# Patient Record
Sex: FEMALE | Race: WHITE | NOT HISPANIC OR LATINO
[De-identification: names, ages, dates, MRNs, and addresses within clinical notes are randomized per-mention and may not be internally consistent; named-entity substitution may affect disease eponyms.]

---

## 2017-03-03 ENCOUNTER — TRANSCRIPTION ENCOUNTER (OUTPATIENT)
Age: 82
End: 2017-03-03

## 2017-03-03 PROBLEM — Z00.00 ENCOUNTER FOR PREVENTIVE HEALTH EXAMINATION: Status: ACTIVE | Noted: 2017-03-03

## 2017-03-09 ENCOUNTER — APPOINTMENT (OUTPATIENT)
Age: 82
End: 2017-03-09

## 2017-03-23 ENCOUNTER — APPOINTMENT (OUTPATIENT)
Dept: WOUND CARE | Facility: CLINIC | Age: 82
End: 2017-03-23

## 2017-04-20 ENCOUNTER — APPOINTMENT (OUTPATIENT)
Age: 82
End: 2017-04-20

## 2017-04-20 ENCOUNTER — OUTPATIENT (OUTPATIENT)
Dept: OUTPATIENT SERVICES | Facility: HOSPITAL | Age: 82
LOS: 1 days | Discharge: HOME | End: 2017-04-20

## 2017-06-01 ENCOUNTER — APPOINTMENT (OUTPATIENT)
Age: 82
End: 2017-06-01

## 2017-06-27 DIAGNOSIS — L08.9 LOCAL INFECTION OF THE SKIN AND SUBCUTANEOUS TISSUE, UNSPECIFIED: ICD-10-CM

## 2017-06-27 DIAGNOSIS — L89.214 PRESSURE ULCER OF RIGHT HIP, STAGE 4: ICD-10-CM

## 2017-06-27 DIAGNOSIS — M25.551 PAIN IN RIGHT HIP: ICD-10-CM

## 2019-02-04 ENCOUNTER — APPOINTMENT (OUTPATIENT)
Dept: GERIATRICS | Facility: HOME HEALTH | Age: 84
End: 2019-02-04

## 2019-02-08 NOTE — PHYSICAL EXAM
[General Appearance - Alert] : alert [General Appearance - In No Acute Distress] : in no acute distress

## 2019-06-13 ENCOUNTER — APPOINTMENT (OUTPATIENT)
Dept: GERIATRICS | Facility: HOME HEALTH | Age: 84
End: 2019-06-13
Payer: MEDICARE

## 2019-06-13 PROCEDURE — 99348 HOME/RES VST EST LOW MDM 30: CPT

## 2019-06-16 NOTE — HISTORY OF PRESENT ILLNESS
[FreeTextEntry1] : pt is homebound due to gait instability and high fall risk. denies any acute complains. arthritis has improved with Rx and therapy and pt feels more stable to manage her ADL by her own. sleep and appetite fairly well [0] : 2) Feeling down, depressed, or hopeless: Not at all

## 2019-06-16 NOTE — REVIEW OF SYSTEMS
[Arthralgias] : no arthralgias [Joint Stiffness] : joint stiffness [Limb Weakness] : limb weakness [Difficulty Walking] : difficulty walking [de-identified] : pt felt easy bruising so has stopped taking ASP and has currently no problems [Negative] : Genitourinary

## 2019-06-16 NOTE — PHYSICAL EXAM
[General Appearance - In No Acute Distress] : in no acute distress [Outer Ear] : the ears and nose were normal in appearance [Sclera] : the sclera and conjunctiva were normal [Extraocular Movements] : extraocular movements were intact [PERRL With Normal Accommodation] : pupils were equal in size, round, and reactive to light [Neck Cervical Mass (___cm)] : no neck mass was observed [Neck Appearance] : the appearance of the neck was normal [Oropharynx] : the oropharynx was normal [Jugular Venous Distention Increased] : there was no jugular-venous distention [Thyroid Diffuse Enlargement] : the thyroid was not enlarged [Auscultation Breath Sounds / Voice Sounds] : lungs were clear to auscultation bilaterally [Thyroid Nodule] : there were no palpable thyroid nodules [Heart Sounds] : normal S1 and S2 [Heart Sounds Gallop] : no gallops [Heart Rate And Rhythm] : heart rate was normal and rhythm regular [Murmurs] : no murmurs [Full Pulse] : the pedal pulses are present [Heart Sounds Pericardial Friction Rub] : no pericardial rub [Edema] : there was no peripheral edema [___ +] : bilateral [unfilled]+ pitting edema to the ankles [Pitting Edema] : pitting edema present [Breast Appearance] : normal in appearance [Breast Palpation Mass] : no palpable masses [Bowel Sounds] : normal bowel sounds [Abdomen Soft] : soft [Abdomen Tenderness] : non-tender [Abdomen Mass (___ Cm)] : no abdominal mass palpated [No CVA Tenderness] : no ~M costovertebral angle tenderness [Ataxic] : ataxic [No Spinal Tenderness] : no spinal tenderness [Skin Turgor] : normal skin turgor [] : no rash [Skin Color & Pigmentation] : normal skin color and pigmentation [FreeTextEntry1] : motor deficits 2/5 LE b/l

## 2019-09-21 ENCOUNTER — INPATIENT (INPATIENT)
Facility: HOSPITAL | Age: 84
LOS: 3 days | Discharge: NURSING, REHAB OR LONG-TERM CARE | End: 2019-09-25
Attending: HOSPITALIST | Admitting: HOSPITALIST
Payer: MEDICARE

## 2019-09-21 VITALS — WEIGHT: 169.98 LBS

## 2019-09-21 LAB
ACANTHOCYTES BLD QL SMEAR: SIGNIFICANT CHANGE UP
ALBUMIN SERPL ELPH-MCNC: 4.1 G/DL — SIGNIFICANT CHANGE UP (ref 3.5–5.2)
ALP SERPL-CCNC: 75 U/L — SIGNIFICANT CHANGE UP (ref 30–115)
ALT FLD-CCNC: 6 U/L — SIGNIFICANT CHANGE UP (ref 0–41)
ANION GAP SERPL CALC-SCNC: 14 MMOL/L — SIGNIFICANT CHANGE UP (ref 7–14)
ANISOCYTOSIS BLD QL: SIGNIFICANT CHANGE UP
APTT BLD: 27.7 SEC — SIGNIFICANT CHANGE UP (ref 27–39.2)
AST SERPL-CCNC: 22 U/L — SIGNIFICANT CHANGE UP (ref 0–41)
BASOPHILS # BLD AUTO: 0 K/UL — SIGNIFICANT CHANGE UP (ref 0–0.2)
BASOPHILS NFR BLD AUTO: 0 % — SIGNIFICANT CHANGE UP (ref 0–1)
BILIRUB SERPL-MCNC: 0.7 MG/DL — SIGNIFICANT CHANGE UP (ref 0.2–1.2)
BUN SERPL-MCNC: 11 MG/DL — SIGNIFICANT CHANGE UP (ref 10–20)
CALCIUM SERPL-MCNC: 9 MG/DL — SIGNIFICANT CHANGE UP (ref 8.5–10.1)
CHLORIDE SERPL-SCNC: 101 MMOL/L — SIGNIFICANT CHANGE UP (ref 98–110)
CO2 SERPL-SCNC: 24 MMOL/L — SIGNIFICANT CHANGE UP (ref 17–32)
CREAT SERPL-MCNC: 0.8 MG/DL — SIGNIFICANT CHANGE UP (ref 0.7–1.5)
EOSINOPHIL # BLD AUTO: 0.12 K/UL — SIGNIFICANT CHANGE UP (ref 0–0.7)
EOSINOPHIL NFR BLD AUTO: 1.8 % — SIGNIFICANT CHANGE UP (ref 0–8)
GIANT PLATELETS BLD QL SMEAR: PRESENT — SIGNIFICANT CHANGE UP
GLUCOSE SERPL-MCNC: 111 MG/DL — HIGH (ref 70–99)
HCT VFR BLD CALC: 30 % — LOW (ref 37–47)
HGB BLD-MCNC: 9.4 G/DL — LOW (ref 12–16)
INR BLD: 1.2 RATIO — SIGNIFICANT CHANGE UP (ref 0.65–1.3)
LYMPHOCYTES # BLD AUTO: 0.55 K/UL — LOW (ref 1.2–3.4)
LYMPHOCYTES # BLD AUTO: 8 % — LOW (ref 20.5–51.1)
MANUAL SMEAR VERIFICATION: SIGNIFICANT CHANGE UP
MCHC RBC-ENTMCNC: 26.1 PG — LOW (ref 27–31)
MCHC RBC-ENTMCNC: 31.3 G/DL — LOW (ref 32–37)
MCV RBC AUTO: 83.3 FL — SIGNIFICANT CHANGE UP (ref 81–99)
METAMYELOCYTES # FLD: 0.9 % — HIGH (ref 0–0)
MICROCYTES BLD QL: SIGNIFICANT CHANGE UP
MONOCYTES # BLD AUTO: 0.06 K/UL — LOW (ref 0.1–0.6)
MONOCYTES NFR BLD AUTO: 0.9 % — LOW (ref 1.7–9.3)
MYELOCYTES NFR BLD: 2.6 % — HIGH (ref 0–0)
NEUTROPHILS # BLD AUTO: 5.94 K/UL — SIGNIFICANT CHANGE UP (ref 1.4–6.5)
NEUTROPHILS NFR BLD AUTO: 85.8 % — HIGH (ref 42.2–75.2)
OVALOCYTES BLD QL SMEAR: SIGNIFICANT CHANGE UP
PLAT MORPH BLD: ABNORMAL
PLATELET # BLD AUTO: 212 K/UL — SIGNIFICANT CHANGE UP (ref 130–400)
POIKILOCYTOSIS BLD QL AUTO: SIGNIFICANT CHANGE UP
POLYCHROMASIA BLD QL SMEAR: SIGNIFICANT CHANGE UP
POTASSIUM SERPL-MCNC: 5.3 MMOL/L — HIGH (ref 3.5–5)
POTASSIUM SERPL-SCNC: 5.3 MMOL/L — HIGH (ref 3.5–5)
PROT SERPL-MCNC: 6.9 G/DL — SIGNIFICANT CHANGE UP (ref 6–8)
PROTHROM AB SERPL-ACNC: 13.8 SEC — HIGH (ref 9.95–12.87)
RBC # BLD: 3.6 M/UL — LOW (ref 4.2–5.4)
RBC # FLD: 21.1 % — HIGH (ref 11.5–14.5)
RBC BLD AUTO: ABNORMAL
SCHISTOCYTES BLD QL AUTO: SLIGHT — SIGNIFICANT CHANGE UP
SODIUM SERPL-SCNC: 139 MMOL/L — SIGNIFICANT CHANGE UP (ref 135–146)
TROPONIN T SERPL-MCNC: <0.01 NG/ML — SIGNIFICANT CHANGE UP
WBC # BLD: 6.92 K/UL — SIGNIFICANT CHANGE UP (ref 4.8–10.8)
WBC # FLD AUTO: 6.92 K/UL — SIGNIFICANT CHANGE UP (ref 4.8–10.8)

## 2019-09-21 PROCEDURE — 93010 ELECTROCARDIOGRAM REPORT: CPT

## 2019-09-21 PROCEDURE — 71045 X-RAY EXAM CHEST 1 VIEW: CPT | Mod: 26

## 2019-09-21 PROCEDURE — 99291 CRITICAL CARE FIRST HOUR: CPT | Mod: GC

## 2019-09-21 PROCEDURE — 99222 1ST HOSP IP/OBS MODERATE 55: CPT

## 2019-09-21 PROCEDURE — 70450 CT HEAD/BRAIN W/O DYE: CPT | Mod: 26

## 2019-09-21 PROCEDURE — 0042T: CPT

## 2019-09-21 RX ORDER — SODIUM CHLORIDE 5 G/100ML
100 INJECTION, SOLUTION INTRAVENOUS
Refills: 0 | Status: DISCONTINUED | OUTPATIENT
Start: 2019-09-21 | End: 2019-09-21

## 2019-09-21 RX ORDER — ENOXAPARIN SODIUM 100 MG/ML
40 INJECTION SUBCUTANEOUS DAILY
Refills: 0 | Status: DISCONTINUED | OUTPATIENT
Start: 2019-09-21 | End: 2019-09-25

## 2019-09-21 RX ORDER — ASPIRIN/CALCIUM CARB/MAGNESIUM 324 MG
325 TABLET ORAL ONCE
Refills: 0 | Status: COMPLETED | OUTPATIENT
Start: 2019-09-21 | End: 2019-09-21

## 2019-09-21 RX ORDER — ATORVASTATIN CALCIUM 80 MG/1
80 TABLET, FILM COATED ORAL ONCE
Refills: 0 | Status: COMPLETED | OUTPATIENT
Start: 2019-09-21 | End: 2019-09-21

## 2019-09-21 RX ORDER — SODIUM CHLORIDE 9 MG/ML
1000 INJECTION INTRAMUSCULAR; INTRAVENOUS; SUBCUTANEOUS
Refills: 0 | Status: DISCONTINUED | OUTPATIENT
Start: 2019-09-21 | End: 2019-09-23

## 2019-09-21 RX ORDER — ATORVASTATIN CALCIUM 80 MG/1
80 TABLET, FILM COATED ORAL AT BEDTIME
Refills: 0 | Status: DISCONTINUED | OUTPATIENT
Start: 2019-09-22 | End: 2019-09-23

## 2019-09-21 RX ORDER — ASPIRIN/CALCIUM CARB/MAGNESIUM 324 MG
300 TABLET ORAL DAILY
Refills: 0 | Status: DISCONTINUED | OUTPATIENT
Start: 2019-09-22 | End: 2019-09-23

## 2019-09-21 RX ORDER — CHLORHEXIDINE GLUCONATE 213 G/1000ML
1 SOLUTION TOPICAL
Refills: 0 | Status: DISCONTINUED | OUTPATIENT
Start: 2019-09-21 | End: 2019-09-25

## 2019-09-21 RX ADMIN — SODIUM CHLORIDE 75 MILLILITER(S): 9 INJECTION INTRAMUSCULAR; INTRAVENOUS; SUBCUTANEOUS at 22:57

## 2019-09-21 NOTE — H&P ADULT - ASSESSMENT
89 year old female with distant history of Colon Ca with liver mets s/p resection 15 years ago, dementia brought in due to left sided weakness.     #Left sided weakness, dysarthria, mild language deficit, dysconjugate gaze.   R/o Brainstem stroke   NIHSS=14  Passed the time frame for tPA  CTH and CT-Perfusion negative  Admit to stroke unit  MRI brain non-cont ordered  , Lipitor 80 now  Tomorrow start  suppository qd, pending clearance by Speech and swallow  Lipitor 80 qhs  PT/OT, Physiatry  Last /65, if drops <140, consider IV fluids    #DVT PPX: Lovenox  #GI PPX: Not indicated  #Diet: NPO, pending speech and swallow assessment   #Activity: Out of bed with assistance  #Full code 89 year old female with distant history of Colon Ca with liver mets s/p resection 15 years ago, dementia brought in due to left sided weakness.     #Left sided weakness, dysarthria, mild language deficit, dysconjugate gaze.   R/o Brainstem stroke   NIHSS=14  Passed the time frame for tPA  CTH and CT-Perfusion negative  Admit to stroke unit  MRI brain non-cont ordered  , Lipitor 80 now  Tomorrow start  suppository qd, pending clearance by Speech and swallow  Lipitor 80 qhs  PT/OT, Physiatry  Gentle hydration NSS at 75cc/Hr, d/c if BP >180    #DVT PPX: Lovenox  #GI PPX: Not indicated  #Diet: NPO, pending speech and swallow assessment   #Activity: Out of bed with assistance  #Full code

## 2019-09-21 NOTE — ED PROVIDER NOTE - PROGRESS NOTE DETAILS
I discussed case with Cosme from Neuro, will see pt. Pt seen by Neuro team, Dr. Arora. Family endorsed to him that they feel pt probably walked to bed at 7pm. Dr. Arora requests CT perfusion. CT perfusion ordered refused Tpa, will admit to stroke unit. admit to stroke unit, no mismatch on CT perfusion

## 2019-09-21 NOTE — ED ADULT TRIAGE NOTE - CHIEF COMPLAINT QUOTE
SOB from home, patient was hypoxic at triage AMS as per daughter, last known well was 2 days ago AMS as per daughter with slurred speech, and left sided facial droop, last known well was 2 days ago

## 2019-09-21 NOTE — ED ADULT NURSE REASSESSMENT NOTE - NS ED NURSE REASSESS COMMENT FT1
rec'd pt from previous RN; pt with left side deficits (arm and leg), left facial droop, aphasia and dysphagia; does nod to yes/no questions; has 18G to RAC; primatfit applied for urine collection

## 2019-09-21 NOTE — CONSULT NOTE ADULT - ASSESSMENT
Impression:  89 y/o woman with PMH colon ca presents with left sided weakness, dysconjugate gaze, mild language deficit and dysarthria. No significant mismatch on CTP. CTH (-)    Plan:  Admit to stroke unit   mg x1 now then 81mg QD  Lipitor 80 mg now then QD  MRI brain without forest  2d echo with bubble  telemetry monitoring  lipid panel, A1C  PT/OT/Speech eval  no TPA as LKW >4.5 hours and no IA intervention as no significant mismatch on CTP Impression:  88 y/o woman with PMH colon ca presents with left sided weakness, dysconjugate gaze, mild language deficit and dysarthria. No significant mismatch on CTP. CTH (-). Suspect brainstem infarct    Plan:  Admit to stroke unit   mg x1 now then 81mg QD  Lipitor 80 mg now then QD  MRI brain without forest  2d echo with bubble  telemetry monitoring  lipid panel, A1C  PT/OT/Speech eval  REEG  no TPA as LKW >4.5 hours and no IA intervention as no significant mismatch on CTP 88 y/o woman with PMH colon ca presents with left sided weakness, dysconjugate gaze, mild language deficit and dysarthria. No significant mismatch on CTP. CTH (-). Suspect brainstem infarct    Plan:  Admit to stroke unit   mg x1 now then 81mg QD  Lipitor 80 mg now then QD  MRI brain without forest  2d echo with bubble  telemetry monitoring  lipid panel, A1C  PT/OT/Speech eval  REEG  no TPA as LKW >4.5 hours and no IA intervention as no significant mismatch on CTP

## 2019-09-21 NOTE — ED PROVIDER NOTE - ATTENDING CONTRIBUTION TO CARE
NIHSS 14 Agree w/ above.    89 y/o F apparent CVA. Last known well day before yesterday, outside window for emergent intervention.   P: CTH, labs, ekg, neuro consult, reassess.

## 2019-09-21 NOTE — H&P ADULT - NSHPPHYSICALEXAM_GEN_ALL_CORE
PHYSICAL EXAM:T(C): 37.1 (09-21-19 @ 15:00), Max: 37.1 (09-21-19 @ 15:00)  HR: 71 (09-21-19 @ 15:00) (71 - 75)  BP: 159/85 (09-21-19 @ 15:00) (144/65 - 172/77)  RR: 16 (09-21-19 @ 15:00) (16 - 18)  SpO2: 98% (09-21-19 @ 15:00) (95% - 99%)  GENERAL: Pale appearing, looks scared  HEAD:  Atraumatic, Normocephalic  EYES: EOMI, PERRLA, conjunctiva and sclera clear  NECK: Supple, No JVD  CHEST/LUNG: Clear to auscultation bilaterally; No wheeze  HEART: Regular rate and rhythm; No murmurs, rubs, or gallops  ABDOMEN: Soft, Nontender, Nondistended; Bowel sounds present  EXTREMITIES:  2+ Peripheral Pulses, No clubbing, cyanosis, or edema  PSYCH: AAOx3  NEUROLOGY: Left upper and lower extremities 0/5 motor strength, mild left sided facial droop, Broca's aphasia   SKIN: No rashes or lesions

## 2019-09-21 NOTE — H&P ADULT - ATTENDING COMMENTS
A 89 yo Colon Cancer with liver mets s/p resection 15 years ago and dementia was brought to ED by EMS for new left sided weakness and slurred speech. Her daughter came to visit her today and found her with left arm weakness and her speech. Last known well was yesterday afternoon.  in the ED stroke code activated, NIHSS 14, patient is not out of window for tPA.     PHYSICAL EXAM:  GENERAL: NAD, well-developed  HEAD:  Atraumatic, Normocephalic  EYES: EOMI, PERRLA, conjunctiva and sclera clear  NECK: Supple, No JVD  CHEST/LUNG: Clear to auscultation bilaterally; No wheeze  HEART: Regular rate and rhythm; S1 S2  ABDOMEN: Soft, Nontender, Nondistended; Bowel sounds present  EXTREMITIES:  2+ Peripheral Pulses, No clubbing, cyanosis, or edema  PSYCH: AAOx3  NEUROLOGY: CN II-XII intact, motor: UE 5/5, LLE 2/5, RLE 3/5  SKIN: No rashes or lesions    A/p:   Acute CVA:   Left side weakness: Now improved, LUE weakness resolved, left facial droop resolved.   Still with lower extremities weakness L>R  CT Perfusion w/ Maps w/ IV Cont (09.21.19) No evidence of major vessel stenosis or occlusion. Normal perfusion images. Mild atheromatous changes   Start on ASA and Lipitor.   Brain MRI, echo. Neurotology follow up  Admitted to stroke unit.  PT, OT, speech and swallow evaluation.     Anemia: normocytic  check iron studies, ferritin, B12, TSH and folate

## 2019-09-21 NOTE — ED PROVIDER NOTE - PHYSICAL EXAMINATION
CONSTITUTIONAL: NAD  SKIN: Warm dry  HEAD: NCAT  EYES: PERRL, EOMI, disconjugate gaze (new?)  ENT: MMM  NECK: Supple; non tender.  CARD: RRR  RESP: CTAB, no resp distress  ABD: S/NT no R/G  EXT: no pedal edema  NEURO: Awake, alert, + L facial droop, + LUE and LLE weakness, + dysarthria and expression aphasia., follows 2 step commands, NIHSS 14

## 2019-09-21 NOTE — ED PROVIDER NOTE - CLINICAL SUMMARY MEDICAL DECISION MAKING FREE TEXT BOX
Pt w/ neuro deficit c/w CVA. During ED course, pt remained stable, handling secretion well. CTH neg for bleed or mass. Later in course, sister endorsed that brother last spoke to pt at 1pm yesterday. then later also later stated that pt probably walked to bed by self last night 7pm. discussed with neuro team. CT perfusion obtained, but did not show sig mismatch. Pt admitted to neuro unit for further w/up and management.

## 2019-09-21 NOTE — H&P ADULT - NSHPLABSRESULTS_GEN_ALL_CORE
9.4    6.92  )-----------( 212      ( 21 Sep 2019 13:35 )             30.0           09-21    139  |  101  |  11  ----------------------------<  111<H>  5.3<H>   |  24  |  0.8    Ca    9.0      21 Sep 2019 13:35    TPro  6.9  /  Alb  4.1  /  TBili  0.7  /  DBili  x   /  AST  22  /  ALT  6   /  AlkPhos  75  09-21          < from: CT Brain Stroke Protocol (09.21.19 @ 15:25) >      IMPRESSION:     Since 2/8/2017:    Stable examination. No CT evidence foracute intracranial pathology.    Chronic small vessel ischemic changes, stable.    < end of copied text >      < from: CT Head No Cont (09.21.19 @ 14:36) >      IMPRESSION:   Since 2/8/2017:    1.  Stable examination. No CT evidence for acute intracranial pathology.    2.  Stable severe chronic microvascular changes.      < end of copied text >

## 2019-09-21 NOTE — ED PROVIDER NOTE - PROGRESS NOTE ADDITIONAL1
After Visit Summary   10/23/2017    Jay Aguilar    MRN: [id number]           Patient Information     Date Of Birth          1946        Visit Information        Provider Department      10/23/2017 8:00 AM Jay Zapata MD [facility]        Care Instructions          Wound Care Instructions     FOR SUPERFICIAL WOUNDS     [facility] [phone]    [facility] [phone]    x9                   AFTER 24 HOURS YOU SHOULD REMOVE THE BANDAGE AND BEGIN DAILY DRESSING CHANGES AS FOLLOWS:     1) Remove Dressing.     2) Clean and dry the area with tap water using a Q-tip or sterile gauze pad.     3) Apply Vaseline, Aquaphor, Polysporin ointment or Bacitracin ointment over entire wound.  Do NOT use Neosporin ointment.     4) Cover the wound with a band-aid, or a sterile non-stick gauze pad and micropore paper tape      REPEAT THESE INSTRUCTIONS AT LEAST ONCE A DAY UNTIL THE WOUND HAS COMPLETELY HEALED.    It is an old wives tale that a wound heals better when it is exposed to air and allowed to dry out. The wound will heal faster with a better cosmetic result if it is kept moist with ointment and covered with a bandage.    **Do not let the wound dry out.**      Supplies Needed:      *Cotton tipped applicators (Q-tips)    *Polysporin Ointment or Bacitracin Ointment (NOT NEOSPORIN)    *Band-aids or non-stick gauze pads and micropore paper tape.      PATIENT INFORMATION:    During the healing process you will notice a number of changes. All wounds develop a small halo of redness surrounding the wound.  This means healing is occurring. Severe itching with extensive redness usually indicates sensitivity to the ointment or bandage tape used to dress the wound.  You should call our office if this develops.      Swelling  and/or discoloration around your surgical site is common, particularly when performed around the eye.    All wounds normally drain.  The  larger the wound the more drainage there will be.  After 7-10 days, you will notice the wound beginning to shrink and new skin will begin to grow.  The wound is healed when you can see skin has formed over the entire area.  A healed wound has a healthy, shiny look to the surface and is red to dark pink in color to normalize.  Wounds may take approximately 4-6 weeks to heal.  Larger wounds may take 6-8 weeks.  After the wound is healed you may discontinue dressing changes.    You may experience a sensation of tightness as your wound heals. This is normal and will gradually subside.    Your healed wound may be sensitive to temperature changes. This sensitivity improves with time, but if you re having a lot of discomfort, try to avoid temperature extremes.    Patients frequently experience itching after their wound appears to have healed because of the continue healing under the skin.  Plain Vaseline will help relieve the itching.        POSSIBLE COMPLICATIONS    BLEEDIN. Leave the bandage in place.  2. Use tightly rolled up gauze or a cloth to apply direct pressure over the bandage for 30  minutes.  3. Reapply pressure for an additional 30 minutes if necessary  4. Use additional gauze and tape to maintain pressure once the bleeding has stopped.      Sutured Wound Care Instructions for Lips or Chin:           [facility] [phone]  Dr Zapata [phone]    * No strenuous activity for 48 hours. Resume moderate activity in 48 hours. No heavy exercising until you are seen for follow up in one week.     *Take Tylenol as needed for discomfort.    * Do not drink Alcoholic beverages for 48 hours after Surgery.    *Keep the Pressure Bandage (white) in place for 24 hours. If the bandage becomes blood tinged or loose, reinforce it with gauze and tape.    *Remove Pressure bandage in 24 hours.    *Leave the flat (brown) bandage in place until your one week follow up appointment.     *Keep the Bandage dry. Wash  around it carefully.     * If the tape gets soiled or starts to come off, reinforce it with additional paper tape.     *Do not smoke for 3 weeks;  Smoking is detrimental to wound healing.     *It is normal to have swelling and bruising around the incision site. The bruising will fade in approximately 10-14 days. Elevate the area to reduce swelling.    *Try to keep your lips/chin as immobile as possible. Refrain from laughing, smiling and yawning for 3 weeks.     *Eat Soft foods for the first 24 hours and take small bites of food for the entire three weeks.   *When brushing your teeth, use a child's toothbrush or use mouth wash to prevent stretching of the surgery site.  * Numbness, itching and sensitivity to temperature changes can occur after surgery and may take up to 18 months to normalize.   * No straws  POSSIBLE COMPLICATIONS:    BLEEDIN. Leave the Bandage in place.  2. Use tightly rolled up gauze or a cloth to apply direct pressure over the bandage for 20 minutes.   3. Reapply pressure for an additional 20 minutes if necessary.   4. Call the Dermatology Office ([phone]) or go to the nearest Emergency room if pressure alone fails to stop the bleeding.   5. Use additional gauze and tape to maintain pressure once bleeding has stopped.     PAIN:   1. Postoperative Pain should slowly get better.   2. A severe increase in pain can indicate a problem.   Call the Office or Dr. Zapata if this occurs.             Follow-ups after your visit        Follow-up notes from your care team     Return in about 1 week (around 10/30/2017) for BANDAGE CHANGE.      Who to contact     If you have questions or need follow up information about today's clinic visit or your schedule please contact [facility] directly at [phone].  Normal or non-critical lab and imaging results will be communicated to you by MyChart, letter or phone within 4 business days after the clinic has received the results. If you do  "not hear from us within 7 days, please contact the clinic through [employer] or phone. If you have a critical or abnormal lab result, we will notify you by phone as soon as possible.  Submit refill requests through [employer] or call your pharmacy and they will forward the refill request to us. Please allow 3 business days for your refill to be completed.          Additional Information About Your Visit        [email or web address] Information     [employer] lets you send messages to your doctor, view your test results, renew your prescriptions, schedule appointments and more. To sign up, go to [email or web address] . Click on \"Log in\" on the left side of the screen, which will take you to the Welcome page. Then click on \"Sign up Now\" on the right side of the page.     You will be asked to enter the access code listed below, as well as some personal information. Please follow the directions to create your username and password.     Your access code is: [email or web address]  Expires: 2018  3:21 PM     Your access code will  in 90 days. If you need help or a new code, please call your [place] clinic or [phone].        Care EveryWhere ID     This is your Care EveryWhere ID. This could be used by other organizations to access your [place] medical records  [id number]        Your Vitals Were     Pulse Pulse Oximetry                56 95%           Blood Pressure from Last 3 Encounters:   10/23/17 (!) 166/93   03/23/15 (!) 141/92   02/18/15 142/87    Weight from Last 3 Encounters:   03/23/15 (!) 161.4 kg (355 lb 12.8 oz)   02/18/15 (!) 161.9 kg (357 lb)   02/11/15 (!) 160.7 kg (354 lb 3.2 oz)              Today, you had the following     No orders found for display       Primary Care Provider Fax #    [facility] [phone] [address] [facility] [address]        Equal Access to Services     IBAN TIMMONS AH: Abel Navarro, barber long, tavares moraesaliftikhar rogel, júnior fernandez " alysia mckeon'aamaureen ah. So [facility] [phone].    ATENCIÓN: Si habla elif, tiene a madden disposición servicios gratuitos de asistencia lingüística. Ruddy al [phone].    We comply with applicable federal civil rights laws and [place] laws. We do not discriminate on the basis of race, color, national origin, age, disability, sex, sexual orientation, or gender identity.            Thank you!     Thank you for choosing [facility]  for your care. Our goal is always to provide you with excellent care. Hearing back from our patients is one way we can continue to improve our services. Please take a few minutes to complete the written survey that you may receive in the mail after your visit with us. Thank you!             Your Updated Medication List - Protect others around you: Learn how to safely use, store and throw away your medicines at www.disposemymeds.org.          This list is accurate as of: 10/23/17  3:21 PM.  Always use your most recent med list.                   Brand Name Dispense Instructions for use Diagnosis    aspirin 325 MG tablet      Take 325 mg by mouth daily        atenolol 50 MG tablet    TENORMIN     Take 50 mg by mouth daily        omeprazole 20 MG CR capsule    priLOSEC     Take 20 mg by mouth daily           Additional Progress Note...

## 2019-09-21 NOTE — ED ADULT NURSE NOTE - NSIMPLEMENTINTERV_GEN_ALL_ED
Implemented All Fall with Harm Risk Interventions:  [place] to call system. Call bell, personal items and telephone within reach. Instruct patient to call for assistance. Room bathroom lighting operational. Non-slip footwear when patient is off stretcher. Physically safe environment: no spills, clutter or unnecessary equipment. Stretcher in lowest position, wheels locked, appropriate side rails in place. Provide visual cue, wrist band, yellow gown, etc. Monitor gait and stability. Monitor for mental status changes and reorient to person, place, and time. Review medications for side effects contributing to fall risk. Reinforce activity limits and safety measures with patient and family. Provide visual clues: red socks.

## 2019-09-21 NOTE — CONSULT NOTE ADULT - SUBJECTIVE AND OBJECTIVE BOX
HPI:  88 y/o woman with PMH colon ca presents with left sided weakness and dysarthria. LKW estimated to be 7-10PM yesterday based on history. No AC, no antiplatelets    PAST MEDICAL & SURGICAL HISTORY:  colon ca    Home Medications:  none    Vital Signs Last 24 Hrs  T(C): 37.1 (21 Sep 2019 15:00), Max: 37.1 (21 Sep 2019 15:00)  T(F): 98.7 (21 Sep 2019 15:00), Max: 98.7 (21 Sep 2019 15:00)  HR: 71 (21 Sep 2019 15:00) (71 - 75)  BP: 159/85 (21 Sep 2019 15:00) (144/65 - 172/77)  BP(mean): --  RR: 16 (21 Sep 2019 15:00) (16 - 18)  SpO2: 98% (21 Sep 2019 15:00) (95% - 99%)    NIHSS:   LOC a0 b1 c0  Facial 1 left  Gaze 1 dysconjugate gaze ? left  Visual 0  Motor Arm 4 left  Motor Leg 3 left 1 right  Ataxia 0  Sensory 0  Dysarthria 2  Language 1  Extinction 0  Total: 14  baseline MRS: 2-3    Allergies    No Known Allergies    Intolerances      MEDICATIONS  (STANDING):  aspirin 325 milliGRAM(s) Oral once  atorvastatin 80 milliGRAM(s) Oral once    MEDICATIONS  (PRN):      LABS:                        9.4    6.92  )-----------( 212      ( 21 Sep 2019 13:35 )             30.0     09-21    139  |  101  |  11  ----------------------------<  111<H>  5.3<H>   |  24  |  0.8    Ca    9.0      21 Sep 2019 13:35    TPro  6.9  /  Alb  4.1  /  TBili  0.7  /  DBili  x   /  AST  22  /  ALT  6   /  AlkPhos  75  09-21    PT/INR - ( 21 Sep 2019 13:35 )   PT: 13.80 sec;   INR: 1.20 ratio         PTT - ( 21 Sep 2019 13:35 )  PTT:27.7 sec        Neuro Imaging:    EEG:     Echo:   Carotid Doppler: N/A  Telemetry: HPI:  89 y/o woman with PMH colon ca presents with left sided weakness and dysarthria. LKW estimated to be 7-10PM yesterday based on history. No AC, no antiplatelets.  had diplopia per family upon initial presentation.  Baseline has HHA 3 days per week for 3 hours, otherwise able to take care of herself.      PAST MEDICAL & SURGICAL HISTORY:  colon ca    Home Medications:  none    Vital Signs Last 24 Hrs  T(C): 37.1 (21 Sep 2019 15:00), Max: 37.1 (21 Sep 2019 15:00)  T(F): 98.7 (21 Sep 2019 15:00), Max: 98.7 (21 Sep 2019 15:00)  HR: 71 (21 Sep 2019 15:00) (71 - 75)  BP: 159/85 (21 Sep 2019 15:00) (144/65 - 172/77)  BP(mean): --  RR: 16 (21 Sep 2019 15:00) (16 - 18)  SpO2: 98% (21 Sep 2019 15:00) (95% - 99%)    NIHSS:   LOC a0 b1 c0  Facial 1 left  Gaze 1 dysconjugate gaze ? partial OS adduction deficit  Visual 0  Motor Arm 4 left  Motor Leg 3 left 1 right  Ataxia 0  Sensory 0  Dysarthria 2  Language 1  Extinction 0  Total: 14  baseline MRS: 2-3    Allergies    No Known Allergies    Intolerances      MEDICATIONS  (STANDING):  aspirin 325 milliGRAM(s) Oral once  atorvastatin 80 milliGRAM(s) Oral once    MEDICATIONS  (PRN):      LABS:                        9.4    6.92  )-----------( 212      ( 21 Sep 2019 13:35 )             30.0     09-21    139  |  101  |  11  ----------------------------<  111<H>  5.3<H>   |  24  |  0.8    Ca    9.0      21 Sep 2019 13:35    TPro  6.9  /  Alb  4.1  /  TBili  0.7  /  DBili  x   /  AST  22  /  ALT  6   /  AlkPhos  75  09-21    PT/INR - ( 21 Sep 2019 13:35 )   PT: 13.80 sec;   INR: 1.20 ratio         PTT - ( 21 Sep 2019 13:35 )  PTT:27.7 sec        Neuro Imaging:    EEG:     Echo:   Carotid Doppler: N/A  Telemetry:

## 2019-09-21 NOTE — ED PROVIDER NOTE - OBJECTIVE STATEMENT
88 y/o F pmh distant hx colon CA w/ mets to liver, s/p resection, no other sig pmh or meds, p/w AMS and L sided weakness. Last seen well (daughter spoke on phone) Thur about 2pm; daughter went to pt's place today (lives alone) about 1230p, found pt in bed w/ difficulty speaking and moving, called EMS. en route . No recent illness, v/d, cp, HA, trauma. No prior CVA, MI, VTE.

## 2019-09-21 NOTE — ED ADULT NURSE NOTE - OBJECTIVE STATEMENT
pt brought in by ambulance accompanied by family for slurred speech and left upper and lower extremity weakness. pt last known well was Thursday night with unknown onset of symptoms. pt is able to follow commands in the ED. As per family, pt is confused as per pt's baseline. pt is able to state her name and date of birth on arrival.  No facial droop noted in the ED. Left upper and lower extremities noted.

## 2019-09-22 LAB
ANION GAP SERPL CALC-SCNC: 12 MMOL/L — SIGNIFICANT CHANGE UP (ref 7–14)
BASOPHILS # BLD AUTO: 0.02 K/UL — SIGNIFICANT CHANGE UP (ref 0–0.2)
BASOPHILS NFR BLD AUTO: 0.2 % — SIGNIFICANT CHANGE UP (ref 0–1)
BUN SERPL-MCNC: 12 MG/DL — SIGNIFICANT CHANGE UP (ref 10–20)
CALCIUM SERPL-MCNC: 9 MG/DL — SIGNIFICANT CHANGE UP (ref 8.5–10.1)
CHLORIDE SERPL-SCNC: 105 MMOL/L — SIGNIFICANT CHANGE UP (ref 98–110)
CHOLEST SERPL-MCNC: 133 MG/DL — SIGNIFICANT CHANGE UP (ref 100–200)
CO2 SERPL-SCNC: 23 MMOL/L — SIGNIFICANT CHANGE UP (ref 17–32)
CREAT SERPL-MCNC: 0.8 MG/DL — SIGNIFICANT CHANGE UP (ref 0.7–1.5)
EOSINOPHIL # BLD AUTO: 0 K/UL — SIGNIFICANT CHANGE UP (ref 0–0.7)
EOSINOPHIL NFR BLD AUTO: 0 % — SIGNIFICANT CHANGE UP (ref 0–8)
GLUCOSE SERPL-MCNC: 119 MG/DL — HIGH (ref 70–99)
HCT VFR BLD CALC: 28 % — LOW (ref 37–47)
HDLC SERPL-MCNC: 36 MG/DL — LOW
HGB BLD-MCNC: 8.8 G/DL — LOW (ref 12–16)
IMM GRANULOCYTES NFR BLD AUTO: 5 % — HIGH (ref 0.1–0.3)
LIPID PNL WITH DIRECT LDL SERPL: 82 MG/DL — SIGNIFICANT CHANGE UP (ref 4–129)
LYMPHOCYTES # BLD AUTO: 0.41 K/UL — LOW (ref 1.2–3.4)
LYMPHOCYTES # BLD AUTO: 3.4 % — LOW (ref 20.5–51.1)
MAGNESIUM SERPL-MCNC: 2.1 MG/DL — SIGNIFICANT CHANGE UP (ref 1.8–2.4)
MCHC RBC-ENTMCNC: 26.1 PG — LOW (ref 27–31)
MCHC RBC-ENTMCNC: 31.4 G/DL — LOW (ref 32–37)
MCV RBC AUTO: 83.1 FL — SIGNIFICANT CHANGE UP (ref 81–99)
MONOCYTES # BLD AUTO: 0.38 K/UL — SIGNIFICANT CHANGE UP (ref 0.1–0.6)
MONOCYTES NFR BLD AUTO: 3.2 % — SIGNIFICANT CHANGE UP (ref 1.7–9.3)
NEUTROPHILS # BLD AUTO: 10.5 K/UL — HIGH (ref 1.4–6.5)
NEUTROPHILS NFR BLD AUTO: 88.2 % — HIGH (ref 42.2–75.2)
NRBC # BLD: 0 /100 WBCS — SIGNIFICANT CHANGE UP (ref 0–0)
PLATELET # BLD AUTO: 237 K/UL — SIGNIFICANT CHANGE UP (ref 130–400)
POTASSIUM SERPL-MCNC: 4.7 MMOL/L — SIGNIFICANT CHANGE UP (ref 3.5–5)
POTASSIUM SERPL-SCNC: 4.7 MMOL/L — SIGNIFICANT CHANGE UP (ref 3.5–5)
RBC # BLD: 3.37 M/UL — LOW (ref 4.2–5.4)
RBC # FLD: 21.3 % — HIGH (ref 11.5–14.5)
SODIUM SERPL-SCNC: 140 MMOL/L — SIGNIFICANT CHANGE UP (ref 135–146)
TOTAL CHOLESTEROL/HDL RATIO MEASUREMENT: 3.7 RATIO — LOW (ref 4–5.5)
TRIGL SERPL-MCNC: 133 MG/DL — SIGNIFICANT CHANGE UP (ref 10–149)
WBC # BLD: 11.91 K/UL — HIGH (ref 4.8–10.8)
WBC # FLD AUTO: 11.91 K/UL — HIGH (ref 4.8–10.8)

## 2019-09-22 PROCEDURE — 99223 1ST HOSP IP/OBS HIGH 75: CPT | Mod: AI

## 2019-09-22 PROCEDURE — 93306 TTE W/DOPPLER COMPLETE: CPT | Mod: 26

## 2019-09-22 PROCEDURE — 99232 SBSQ HOSP IP/OBS MODERATE 35: CPT

## 2019-09-22 PROCEDURE — 70551 MRI BRAIN STEM W/O DYE: CPT | Mod: 26

## 2019-09-22 RX ORDER — ACETAMINOPHEN 500 MG
650 TABLET ORAL EVERY 8 HOURS
Refills: 0 | Status: DISCONTINUED | OUTPATIENT
Start: 2019-09-22 | End: 2019-09-25

## 2019-09-22 RX ADMIN — ATORVASTATIN CALCIUM 80 MILLIGRAM(S): 80 TABLET, FILM COATED ORAL at 21:48

## 2019-09-22 RX ADMIN — ENOXAPARIN SODIUM 40 MILLIGRAM(S): 100 INJECTION SUBCUTANEOUS at 12:23

## 2019-09-22 RX ADMIN — SODIUM CHLORIDE 75 MILLILITER(S): 9 INJECTION INTRAMUSCULAR; INTRAVENOUS; SUBCUTANEOUS at 09:47

## 2019-09-22 RX ADMIN — Medication 300 MILLIGRAM(S): at 12:24

## 2019-09-22 RX ADMIN — CHLORHEXIDINE GLUCONATE 1 APPLICATION(S): 213 SOLUTION TOPICAL at 06:41

## 2019-09-22 NOTE — PROGRESS NOTE ADULT - SUBJECTIVE AND OBJECTIVE BOX
SUBJECTIVE:    Patient is a 89y old Female who presents with a chief complaint of Left sided weakness (21 Sep 2019 18:43)    Currently admitted to medicine with the primary diagnosis of CVA (cerebral vascular accident)     Today is hospital day 1d. This morning she is resting comfortably in bed and reports no new issues or overnight events.   Pt seen at bedside, is undergoing stroke workup.     PAST MEDICAL & SURGICAL HISTORY  Colon cancer metastasized to liver: Underwent surgical resection 15 years ago    SOCIAL HISTORY:  Negative for smoking/alcohol/drug use.     ALLERGIES:  No Known Allergies    MEDICATIONS:  STANDING MEDICATIONS  aspirin Suppository 300 milliGRAM(s) Rectal daily  atorvastatin 80 milliGRAM(s) Oral at bedtime  chlorhexidine 4% Liquid 1 Application(s) Topical <User Schedule>  enoxaparin Injectable 40 milliGRAM(s) SubCutaneous daily  sodium chloride 0.9%. 1000 milliLiter(s) IV Continuous <Continuous>    PRN MEDICATIONS    VITALS:   T(F): 97.6  HR: 87  BP: 178/72  RR: 16  SpO2: 98%    LABS:                        8.8    11.91 )-----------( 237      ( 22 Sep 2019 06:01 )             28.0     09-22    140  |  105  |  12  ----------------------------<  119<H>  4.7   |  23  |  0.8    Ca    9.0      22 Sep 2019 06:01  Mg     2.1     09-22    TPro  6.9  /  Alb  4.1  /  TBili  0.7  /  DBili  x   /  AST  22  /  ALT  6   /  AlkPhos  75  09-21    PT/INR - ( 21 Sep 2019 13:35 )   PT: 13.80 sec;   INR: 1.20 ratio         PTT - ( 21 Sep 2019 13:35 )  PTT:27.7 sec      Troponin T, Serum: <0.01 ng/mL (09-21-19 @ 13:35)      CARDIAC MARKERS ( 21 Sep 2019 13:35 )  x     / <0.01 ng/mL / x     / x     / x          RADIOLOGY:  < from: CT Head No Cont (09.21.19 @ 14:36) >  IMPRESSION:   Since 2/8/2017:    1.  Stable examination. No CT evidence for acute intracranial pathology.    2.  Stable severe chronic microvascular changes.    < end of copied text >    < from: CT Perfusion w/ Maps w/ IV Cont (09.21.19 @ 18:27) >    IMPRESSION:    1.  No evidence of major vessel stenosis or occlusion. Normal perfusion   images.    2.  Mild atheromatous changes as described.    3.  Tiny (2 mm) focal outpouching of the posterior wall of the distal   cervical right vertebral artery, consider a follow up study for stability.    < end of copied text >    PHYSICAL EXAM:  GEN: No acute distress  LUNGS: Clear to auscultation bilaterally   HEART: S1/S2 present. RRR.   ABD: Soft, non-tender, non-distended. Bowel sounds present  EXT: NC/NC/NE/2+PP/BAKER  NEURO: Left side weakness.

## 2019-09-22 NOTE — PROGRESS NOTE ADULT - ASSESSMENT
89 year old female with distant history of Colon Ca with liver mets s/p resection 15 years ago, dementia brought in due to left sided weakness.     #Left sided weakness, dysarthria, mild language deficit, dysconjugate gaze.   R/o Brainstem stroke   NIHSS=14 on admission.   Passed the time frame for tPA  CTH and CT-Perfusion negative  MRI brain non-cont ordered  , Lipitor 80 on admission.   c/w  suppository qd, pending clearance by Speech and swallow  Lipitor 80 qhs, ASA 81 after cleared by S and S.   PT/OT, Physiatry  Gentle hydration NSS at 75cc/Hr, d/c if BP >180  permissive HTN for now.     # Normocytic Anemia  - No baseline in records  - monitor, HD stable, no sign active GI bleed.  - tranfuse prn     #  Mild Leukocytosis  - possibly reactive, no signs of sepsis.   - monitor.       #DVT PPX: Lovenox  #GI PPX: Not indicated  #Diet: NPO, pending speech and swallow assessment   #Activity: Out of bed with assistance  #Full code

## 2019-09-22 NOTE — PROGRESS NOTE ADULT - ASSESSMENT
Impression:  89 y/o woman with PMH colon ca presents with left sided weakness, dysconjugate gaze, mild language deficit and dysarthria. Ecam today shows some improvement. MRI scheduled for today.    Plan:  Continue stroke unit monitoring  MRI brain  PT/OT/Speech  Echo  REEG  ASA 81mg QD  Lipitor 80 mg QD 89 y/o woman with PMH colon ca presents with left sided weakness, dysconjugate gaze, mild language deficit and dysarthria with some improvement s/o lacunar infarct.    Plan:  Continue stroke unit monitoring  MRI brain  PT/OT/Speech  Echo  REEG  ASA 81mg QD  Lipitor 80 mg QD

## 2019-09-23 LAB
ALBUMIN SERPL ELPH-MCNC: 3.5 G/DL — SIGNIFICANT CHANGE UP (ref 3.5–5.2)
ALP SERPL-CCNC: 63 U/L — SIGNIFICANT CHANGE UP (ref 30–115)
ALT FLD-CCNC: <5 U/L — SIGNIFICANT CHANGE UP (ref 0–41)
ANION GAP SERPL CALC-SCNC: 10 MMOL/L — SIGNIFICANT CHANGE UP (ref 7–14)
APPEARANCE UR: ABNORMAL
AST SERPL-CCNC: 12 U/L — SIGNIFICANT CHANGE UP (ref 0–41)
BACTERIA # UR AUTO: ABNORMAL
BASOPHILS # BLD AUTO: 0 K/UL — SIGNIFICANT CHANGE UP (ref 0–0.2)
BASOPHILS NFR BLD AUTO: 0 % — SIGNIFICANT CHANGE UP (ref 0–1)
BILIRUB SERPL-MCNC: 0.6 MG/DL — SIGNIFICANT CHANGE UP (ref 0.2–1.2)
BILIRUB UR-MCNC: NEGATIVE — SIGNIFICANT CHANGE UP
BUN SERPL-MCNC: 15 MG/DL — SIGNIFICANT CHANGE UP (ref 10–20)
CALCIUM SERPL-MCNC: 8.7 MG/DL — SIGNIFICANT CHANGE UP (ref 8.5–10.1)
CHLORIDE SERPL-SCNC: 109 MMOL/L — SIGNIFICANT CHANGE UP (ref 98–110)
CO2 SERPL-SCNC: 24 MMOL/L — SIGNIFICANT CHANGE UP (ref 17–32)
COLOR SPEC: YELLOW — SIGNIFICANT CHANGE UP
CREAT SERPL-MCNC: 0.8 MG/DL — SIGNIFICANT CHANGE UP (ref 0.7–1.5)
DIFF PNL FLD: ABNORMAL
EOSINOPHIL # BLD AUTO: 0 K/UL — SIGNIFICANT CHANGE UP (ref 0–0.7)
EOSINOPHIL NFR BLD AUTO: 0 % — SIGNIFICANT CHANGE UP (ref 0–8)
EPI CELLS # UR: 2 /HPF — SIGNIFICANT CHANGE UP (ref 0–5)
GLUCOSE SERPL-MCNC: 96 MG/DL — SIGNIFICANT CHANGE UP (ref 70–99)
GLUCOSE UR QL: NEGATIVE — SIGNIFICANT CHANGE UP
HCT VFR BLD CALC: 26.9 % — LOW (ref 37–47)
HGB BLD-MCNC: 8.3 G/DL — LOW (ref 12–16)
HYALINE CASTS # UR AUTO: 2 /LPF — SIGNIFICANT CHANGE UP (ref 0–7)
IMM GRANULOCYTES NFR BLD AUTO: 5.1 % — HIGH (ref 0.1–0.3)
KETONES UR-MCNC: NEGATIVE — SIGNIFICANT CHANGE UP
LEUKOCYTE ESTERASE UR-ACNC: ABNORMAL
LYMPHOCYTES # BLD AUTO: 0.38 K/UL — LOW (ref 1.2–3.4)
LYMPHOCYTES # BLD AUTO: 6.3 % — LOW (ref 20.5–51.1)
MAGNESIUM SERPL-MCNC: 2 MG/DL — SIGNIFICANT CHANGE UP (ref 1.8–2.4)
MCHC RBC-ENTMCNC: 26 PG — LOW (ref 27–31)
MCHC RBC-ENTMCNC: 30.9 G/DL — LOW (ref 32–37)
MCV RBC AUTO: 84.3 FL — SIGNIFICANT CHANGE UP (ref 81–99)
MONOCYTES # BLD AUTO: 0.2 K/UL — SIGNIFICANT CHANGE UP (ref 0.1–0.6)
MONOCYTES NFR BLD AUTO: 3.3 % — SIGNIFICANT CHANGE UP (ref 1.7–9.3)
NEUTROPHILS # BLD AUTO: 5.14 K/UL — SIGNIFICANT CHANGE UP (ref 1.4–6.5)
NEUTROPHILS NFR BLD AUTO: 85.3 % — HIGH (ref 42.2–75.2)
NITRITE UR-MCNC: POSITIVE
NRBC # BLD: 0 /100 WBCS — SIGNIFICANT CHANGE UP (ref 0–0)
PH UR: 6 — SIGNIFICANT CHANGE UP (ref 5–8)
PLATELET # BLD AUTO: 216 K/UL — SIGNIFICANT CHANGE UP (ref 130–400)
POTASSIUM SERPL-MCNC: 4.6 MMOL/L — SIGNIFICANT CHANGE UP (ref 3.5–5)
POTASSIUM SERPL-SCNC: 4.6 MMOL/L — SIGNIFICANT CHANGE UP (ref 3.5–5)
PROT SERPL-MCNC: 6 G/DL — SIGNIFICANT CHANGE UP (ref 6–8)
PROT UR-MCNC: ABNORMAL
RBC # BLD: 3.19 M/UL — LOW (ref 4.2–5.4)
RBC # FLD: 21.4 % — HIGH (ref 11.5–14.5)
RBC CASTS # UR COMP ASSIST: 35 /HPF — HIGH (ref 0–4)
SODIUM SERPL-SCNC: 143 MMOL/L — SIGNIFICANT CHANGE UP (ref 135–146)
SP GR SPEC: 1.02 — SIGNIFICANT CHANGE UP (ref 1.01–1.02)
UROBILINOGEN FLD QL: SIGNIFICANT CHANGE UP
WBC # BLD: 6.03 K/UL — SIGNIFICANT CHANGE UP (ref 4.8–10.8)
WBC # FLD AUTO: 6.03 K/UL — SIGNIFICANT CHANGE UP (ref 4.8–10.8)
WBC UR QL: >720 /HPF — HIGH (ref 0–5)

## 2019-09-23 PROCEDURE — 99232 SBSQ HOSP IP/OBS MODERATE 35: CPT

## 2019-09-23 RX ORDER — ASPIRIN/CALCIUM CARB/MAGNESIUM 324 MG
81 TABLET ORAL DAILY
Refills: 0 | Status: DISCONTINUED | OUTPATIENT
Start: 2019-09-23 | End: 2019-09-25

## 2019-09-23 RX ORDER — ATORVASTATIN CALCIUM 80 MG/1
40 TABLET, FILM COATED ORAL AT BEDTIME
Refills: 0 | Status: DISCONTINUED | OUTPATIENT
Start: 2019-09-23 | End: 2019-09-25

## 2019-09-23 RX ADMIN — Medication 81 MILLIGRAM(S): at 11:41

## 2019-09-23 RX ADMIN — ENOXAPARIN SODIUM 40 MILLIGRAM(S): 100 INJECTION SUBCUTANEOUS at 11:42

## 2019-09-23 RX ADMIN — ATORVASTATIN CALCIUM 40 MILLIGRAM(S): 80 TABLET, FILM COATED ORAL at 22:15

## 2019-09-23 RX ADMIN — CHLORHEXIDINE GLUCONATE 1 APPLICATION(S): 213 SOLUTION TOPICAL at 05:52

## 2019-09-23 RX ADMIN — SODIUM CHLORIDE 75 MILLILITER(S): 9 INJECTION INTRAMUSCULAR; INTRAVENOUS; SUBCUTANEOUS at 02:21

## 2019-09-23 NOTE — PHYSICAL THERAPY INITIAL EVALUATION ADULT - GAIT DISTANCE, PT EVAL
standing 10 seconds x 2 wide PRATIK. with difficulty weight shifting. Pt able to take 3 steps bed to chair with max A x 1 PT anterior for safety and weight shifting

## 2019-09-23 NOTE — PROGRESS NOTE ADULT - SUBJECTIVE AND OBJECTIVE BOX
Neurology Follow up note  Patient seen and examined at bedside , there is improvement in the left sided weakness which is now mild hemiparesis.  Able to tolerate mechanical soft diet     Vital Signs Last 24 Hrs  T(C): 35.7 (23 Sep 2019 21:24), Max: 36.7 (23 Sep 2019 14:43)  T(F): 96.2 (23 Sep 2019 21:24), Max: 98.1 (23 Sep 2019 14:43)  HR: 81 (23 Sep 2019 21:24) (70 - 98)  BP: 138/63 (23 Sep 2019 21:24) (130/59 - 146/63)  BP(mean): --  RR: 18 (23 Sep 2019 21:24) (18 - 18)  SpO2: 97% (23 Sep 2019 17:23) (97% - 99%)          Neurological Exam:   Mental status: Awake, alert and oriented x3.  Recent and remote memory intact.  Naming, repetition and comprehension intact.  Attention/concentration intact.  No dysarthria, no aphasia.  Fund of knowledge appropriate.    Cranial nerves: Fundoscopic exam demonstrated no abnormalities, pupils equally round and reactive to light, visual fields full, no nystagmus, extraocular muscles intact, V1 through V3 intact bilaterally and symmetric, face symmetric, hearing intact to finger rub, palate elevation symmetric, tongue was midline, sternocleidomastoid/shoulder shrug strength bilaterally 5/5.    Motor:  Normal bulk and tone, strength 5/5 in bilateral upper and lower extremities.   strength 5/5.  Rapid alternating movements intact and symmetric.   Sensation: Intact to light touch, proprioception, and pinprick.  No neglect.   Coordination: No dysmetria on finger-to-nose and heel-to-shin.  No clumsiness.  Reflexes: 2+ in upper and lower extremities, downgoing toes bilaterally  Gait: Narrow and steady. No ataxia.  Romberg negative    NIHSS  LOC:       1a: 0    1b(Questions): 0          1c(Instructions): 0            Best Gaze: 0  Visual:0  Motor:                 RUE: 0    RLE: 0     LUE:1     LLE: 1    FACE: 0    Limb Ataxia:0  Sensory: 0      Language:  0     Dysarthria: 1         Extinction and Inattention: 0    NIHSS today: 3           m-RS:4      Medications  acetaminophen  Suppository .. 650 milliGRAM(s) Rectal every 8 hours PRN  aspirin  chewable 81 milliGRAM(s) Oral daily  atorvastatin 40 milliGRAM(s) Oral at bedtime  chlorhexidine 4% Liquid 1 Application(s) Topical <User Schedule>  enoxaparin Injectable 40 milliGRAM(s) SubCutaneous daily      Lab  Lipid Profile (09.22.19 @ 06:01)    Total Cholesterol/HDL Ratio Measurement: 3.7 Ratio    Cholesterol, Serum: 133 mg/dL    Triglycerides, Serum: 133 mg/dL    HDL Cholesterol, Serum: 36: HDL Levels >/= 60 mg/dL are considered beneficial and a "negative" risk  factor.  Effective 08/15/2018: New reference range and interpretive comment. mg/dL    Direct LDL: 82: LDL Cholesterol (mg/dL) --- Interpretive Comment (for adults 18 and over)        Radiology    ECHO:   < from: Transthoracic Echocardiogram (09.22.19 @ 10:40) >  Summary:   1. Left ventricular ejection fraction, by visual estimation, is 55-65%.  2. Normal global left ventricular systolic function.   3. Spectral Doppler shows impaired relaxation pattern of left   ventricular myocardial filling (Grade I diastolic dysfunction).   4. Sclerotic aortic valve with normal opening.   5. No evidence of aortic stenosis.   6. Trivial aortic regurgitation.   7. Estimated pulmonary artery systolic pressure is 43.0 mmHg assuming a   right atrial pressure of 8 mmHg, which is consistent with mild pulmonary   hypertension.   8. No evidence of patent foramen ovale.

## 2019-09-23 NOTE — PROGRESS NOTE ADULT - ASSESSMENT
Assessment and Plan:   This is a 89y old female with distant history of Colon Ca with liver mets s/p resection 15 years ago, dementia brought in due to left sided weakness. She was found to have an acute/subacute infarct of the juno. Her exam has improved since admission.    #R pontine acute/subacute infarct: exam improving. LDL 82  -continue with stroke unit monitoring  -switch to ASA 81mg PO (patient passed S+S eval)  -continue with lipitor 40mg PO  -d/c fluids (BP at goal)  -PT/OT/rehab consults pending  -appreciate neuro recs    #Normocytic anemia: stable  -daily CBC  -maintain T+S  -transfuse < 7    #Leukocytosis: resolved  _____________________________________________  DVT ppx: Lovenox subq  GI ppx: not indicated  Code Status: full code  Diet: dysphagia     DISPO: pending further monitoring. SNF vs 4A

## 2019-09-23 NOTE — PROGRESS NOTE ADULT - ASSESSMENT
89 y/o woman with PMH colon ca presents with left sided weakness, dysconjugate gaze, mild language deficit and dysarthria. No acute events.     MRI shows acute/subacute infarction in the right juno.  LDL: 82  ECHO: Negative for PFO   Brain vascular imaging: No major vascular stenosis, 2mm outpouching in the distal right vertebral artery       Plan:  Continue stroke unit monitoring  PT/OT/rehab Speech  continue ASA 81mg QD  continue Lipitor 80 mg QD 89 y/o woman with PMH colon ca presents with left sided weakness, dysconjugate gaze, mild language deficit and dysarthria. No acute events.     MRI shows acute/subacute infarction in the right juno.  LDL: 82  ECHO: Negative for PFO   Brain vascular imaging: No major vascular stenosis, 2mm outpouching in the distal right vertebral artery       Plan:  Can downgrade from stroke unit and ok for rehab  PT/OT/rehab Speech  continue ASA 81mg QD  continue Lipitor 80 mg QD  Follow up endovascular clinic for aneurysm

## 2019-09-23 NOTE — CONSULT NOTE ADULT - SUBJECTIVE AND OBJECTIVE BOX
Patient is a 89y old  Female who presents with a chief complaint of Left sided weakness (23 Sep 2019 11:38)    HPI:  89 year old female with distant history of Colon Ca with liver mets s/p resection 15 years ago, dementia brought in due to left sided weakness. Patient lives at home alone, was called by daughter around 1 PM yesterday and was fine. Daughter came to visit her today, and found her in bed unable to move left side and having difficulty speaking. Daughter states that patient goes to bed around 10, so that's likely time last known well.   Currently patient denies any complaints.  Per daughter, at baseline patient ambulates at home, has mild dementia, with problems with short term memory. (21 Sep 2019 18:43)      PAST MEDICAL & SURGICAL HISTORY:  Colon cancer metastasized to liver: Underwent surgical resection 15 years ago      Hospital Course: #R pontine acute/subacute infarct: exam improving. LDL 82  -continue with stroke unit monitoring  -switch to ASA 81mg PO (patient passed S+S eval)  -continue with lipitor 40mg PO  -d/c fluids (BP at goal)    TODAY'S SUBJECTIVE & REVIEW OF SYMPTOMS:     Constitutional WNL   Cardio WNL   Resp WNL   GI WNL  Heme WNL  Endo WNL  Skin WNL  MSK WNL  Neuro WNL  Cognitive WNL  Psych WNL      MEDICATIONS  (STANDING):  aspirin  chewable 81 milliGRAM(s) Oral daily  atorvastatin 40 milliGRAM(s) Oral at bedtime  chlorhexidine 4% Liquid 1 Application(s) Topical <User Schedule>  enoxaparin Injectable 40 milliGRAM(s) SubCutaneous daily    MEDICATIONS  (PRN):  acetaminophen  Suppository .. 650 milliGRAM(s) Rectal every 8 hours PRN Mild Pain (1 - 3)      FAMILY HISTORY:      Allergies    No Known Allergies    Intolerances        SOCIAL HISTORY:    [    ] Etoh  [    ] Smoking  [    ] Substance abuse     Home Environment:  [  x  ] Home Alone  [    ] Lives with Family  [    ] Home Health Aid    Dwelling:  [    ] Apartment  [  x  ] Private House  [    ] Adult Home  [    ] Skilled Nursing Facility      [    ] Short Term  [    ] Long Term  [  x  ] Stairs                           [    ] Elevator     FUNCTIONAL STATUS PTA: (Check all that apply)  Ambulation: [ x    ]Independent    [    ] Dependent     [    ] Non-Ambulatory  Assistive Device: [   x ] SA Cane  [    ]  Q Cane  [    ] Walker  [    ]  Wheelchair  ADL : [  x  ] Independent  [    ]  Dependent       Vital Signs Last 24 Hrs  T(C): 36.2 (23 Sep 2019 05:45), Max: 36.2 (23 Sep 2019 05:45)  T(F): 97.2 (23 Sep 2019 05:45), Max: 97.2 (23 Sep 2019 05:45)  HR: 98 (23 Sep 2019 11:25) (70 - 98)  BP: 130/59 (23 Sep 2019 11:25) (130/59 - 162/70)  BP(mean): 100 (22 Sep 2019 18:00) (100 - 100)  RR: 18 (23 Sep 2019 05:45) (16 - 18)  SpO2: 99% (23 Sep 2019 11:25) (97% - 99%)      PHYSICAL EXAM: Alert & Oriented X2  GENERAL: NAD, well-groomed, well-developed  HEAD:  Atraumatic, Normocephalic  EYES: EOMI, PERRLA, conjunctiva and sclera clear  NECK: Supple  CHEST/LUNG: Clear bilaterally  HEART: Regular rate and rhythm  ABDOMEN: Soft, Nontender, Nondistended; Bowel sounds present  EXTREMITIES:  no calf tenderness,no edema BLES    NERVOUS SYSTEM:  Cranial Nerves 2-12 intact [ x   ] Abnormal  [    ]  ROM: WFL all extremities [x    ]  Abnormal [     ]  Motor Strength: WFL all extremities  [ x   ]  Abnormal [    ]  Sensation: intact to light touch [  x  ] Abnormal [    ]  Minimal L sided weakness    FUNCTIONAL STATUS:  Bed Mobility: [   ]  Independent [    ]  Supervision [x    ]  Needs Assistance [  ]  N/A  Transfers: [    ]  Independent [    ]  Supervision [   x ]  Needs Assistance [    ]  N/A    Ambulation:  [    ]  Independent [    ]  Supervision [  x  ]  Needs Assistance [    ]  N/A   ADL:  [    ]   Independent [  x  ] Requires Assistance [    ] N/A       LABS:                        8.3    6.03  )-----------( 216      ( 23 Sep 2019 06:35 )             26.9     09-23    143  |  109  |  15  ----------------------------<  96  4.6   |  24  |  0.8    Ca    8.7      23 Sep 2019 06:35  Mg     2.0     09-23    TPro  6.0  /  Alb  3.5  /  TBili  0.6  /  DBili  x   /  AST  12  /  ALT  <5  /  AlkPhos  63  09-23    PT/INR - ( 21 Sep 2019 13:35 )   PT: 13.80 sec;   INR: 1.20 ratio         PTT - ( 21 Sep 2019 13:35 )  PTT:27.7 sec      RADIOLOGY & ADDITIONAL STUDIES:

## 2019-09-23 NOTE — PROGRESS NOTE ADULT - SUBJECTIVE AND OBJECTIVE BOX
WILLIAN SETH    Chief Complaint:    Handed    HPI:  89 year old female with distant history of Colon Ca with liver mets s/p resection 15 years ago, dementia brought in due to left sided weakness. Patient lives at home alone, was called by daughter around 1 PM yesterday and was fine. Daughter came to visit her today, and found her in bed unable to move left side and having difficulty speaking. Daughter states that patient goes to bed around 10, so that's likely time last known well.   Currently patient denies any complaints.  Per daughter, at baseline patient ambulates at home, has mild dementia, with problems with short term memory. (21 Sep 2019 18:43)      Relevant PMH:  [] Prior ischemic stroke/TIA  [] Afib  []CAD  []HTN  []DLD  []DM []PVD []Obesity [] Sedintary lifestyle []CHF  []RADU  []Cancer Hx     Social History: [] Smoking []  Drug Use: []   Alcohol Use:   [] Other:      Possible Location of Stroke:    Possible Cause of Stroke:    Relevant Cerebral Imaging:    Relevant Cervicocerebral Imaging:      CT Perfusion w/ Maps w/ IV Cont:   EXAM:  CT PERFUSION W MAPS IC            PROCEDURE DATE:  09/21/2019            INTERPRETATION:  Clinical History / Reason for exam: Stroke code    Technique: CT angiogram of the head and neck including perfusion study of   the brain.  Contiguous CT axial images of the head and neck were obtained   following the bolus intravenous administration of 120 cc Optiray with   multiple 3-D and MIP reformats with perfusion mapping performed on the   3-D workstation.    Correlation made with accompanyingnoncontrast head CT.    Findings:    Perfusion:  There is no evidence of focal perfusion deficit to suggest   acute cerebral ischemia.    CTA neck: The visualized aortic arch and great vessel origins are patent.    There is streak artifact from dental amalgam obscuring evaluation of the   upper cervical vessels at the C1-2 level.    The right common carotid artery is patent. The right carotid bifurcation   is patent. There is a tortuous vascular loop of the right ICA below the   skull base with areas of vascular irregularity, likely on an   atherosclerotic basis. There is likely mild degree of resultant stenosis.    The left common carotid artery is patent. The left carotid bifurcation   demonstrates a mild stenosis of the ECA origin. The ECAorigin is patent.   There is a tortuous vascular loop of the left ICA below the skull base. A   portion of the left ICA is completely obscured but there is good filling   distally.    The vertebral arteries are patent. Tiny (2 mm) focal outpouching arising   from the posterior wall of the V3 segment of the right vertebral artery   on series 4 image 317.    CTA brain: There is calcific plaque of the carotid siphons without   significant stenosis. There is a hypoplastic A1 segment of the right SHAWN,  normal variation.    The distal vertebral arteries are patent. The basilar artery is patent.    There are scattered mild ventral cranial stenoses including the left MCA   distal M1 segment and the right proximal PCA.    Other:    Moderate cervical spine degenerative changes.    IMPRESSION:    1.  No evidence of major vessel stenosis or occlusion. Normal perfusion   images.    2.  Mild atheromatous changes as described.    3.  Tiny (2 mm) focal outpouching of the posterior wall of the distal   cervical right vertebral artery, consider a follow up study for stability.                  JONO JIM M.D., ATTENDING RADIOLOGIST  This document has been electronically signed. Sep 21 2019  7:52PM             (09-21-19 @ 18:27)      Relevant blood tests:  Direct LDL: 82 mg/dL [4 - 129] (09-22-19 @ 06:01)      Relevant cardiac rhythm monitoring:    Relevant Cardiac Structure:(TTE/SRAVAN +/-):[]No intracardiac thrombus/[] no vegetation/[]no [place]/EF:      Home Medications:      MEDICATIONS  (STANDING):  aspirin Suppository 300 milliGRAM(s) Rectal daily  atorvastatin 80 milliGRAM(s) Oral at bedtime  chlorhexidine 4% Liquid 1 Application(s) Topical <User Schedule>  enoxaparin Injectable 40 milliGRAM(s) SubCutaneous daily  sodium chloride 0.9%. 1000 milliLiter(s) (75 mL/Hr) IV Continuous <Continuous>      PT/OT/Speech/Rehab/S&Swr:    Exam:    Vital Signs Last 24 Hrs  T(C): 36.1 (22 Sep 2019 22:00), Max: 36.8 (22 Sep 2019 08:00)  T(F): 96.9 (22 Sep 2019 22:00), Max: 98.2 (22 Sep 2019 08:00)  HR: 82 (22 Sep 2019 22:00) (77 - 88)  BP: 140/64 (22 Sep 2019 22:00) (140/64 - 178/72)  BP(mean): 100 (22 Sep 2019 18:00) (97 - 107)  RR: 18 (22 Sep 2019 22:00) (16 - 18)  SpO2: 98% (22 Sep 2019 22:00) (97% - 98%)    NIHSS      LOC:       1a: 0    1b(Questions): 1          1c(Instructions): 0            Best Gaze: 0  Visual:0  Motor:                 RUE: 0    RLE: 2     LUE:1     LLE: 3    FACE: 0    Limb Ataxia:1  Sensory: 0      Language:  0     Dysarthria: 1         Extinction and Inattention: 0    NIHSS on admission:          NIHSS yesterday:          NIHSS today: 9            m-RS:4    Impression:      Suggestion:  Routine stroke workup including:    Disposition:

## 2019-09-23 NOTE — CONSULT NOTE ADULT - ASSESSMENT
IMPRESSION: Rehab of CVA L janna    PRECAUTIONS: [x    ] Cardiac  [    ] Respiratory  [    ] Seizures [    ] Contact Isolation  [    ] Droplet Isolation  [    ] Other    Weight Bearing Status:     RECOMMENDATION:    Out of Bed to Chair     DVT/Decubiti Prophylaxis    REHAB PLAN:     [  x   ] Bedside P/T 3-5 times a week   [x     ] Bedside O/T  2-3 times a week   [     ] No Rehab Therapy Indicated   [     ]  Speech Therapy   Conditioning/ROM                                 ADL  Bed Mobility                                            Conditioning/ROM  Transfers                                                  Bed Mobility  Sitting /Standing Balance                      Transfers                                        Gait Training                                            Sitting/Standing Balance  Stair Training [x   ]Applicable                 Home equipment Eval                                                                     Splinting  [   ] Only      GOALS:   ADL   [    x]   Independent         Transfers  [   x ] Independent            Ambulation  [ x    ] Independent     [x     ] With device                            [    ]  CG                                               [    ]  CG                                                    [     ] CG                            [    ] Min A                                          [    ] Min A                                                [     ] Min  A          DISCHARGE PLAN:   [ x    ]  Good candidate for Intensive Rehabilitation/Hospital based                                             Will tolerate 3hrs Intensive Rehab Daily                                       [      ]  Short Term Rehab in Skilled Nursing Facility                                       [      ]  Home with Outpatient or  services                                         [      ]  Possible Candidate for Intensive Hospital based Rehab

## 2019-09-23 NOTE — PROGRESS NOTE ADULT - ASSESSMENT
88 y/o woman with PMH colon ca presents with left sided weakness, dysconjugate gaze, mild language deficit and dysarthria with some improvement s/o lacunar infarct.  MRI shows acute/subacute infarction in the right juno.       Plan:  Continue stroke unit monitoring  PT/OT/Speech  Echo  Continue  ASA 81mg QD  Lipitor 80 mg QD      Neuroattending note will follow

## 2019-09-23 NOTE — OCCUPATIONAL THERAPY INITIAL EVALUATION ADULT - NS ASR FOLLOW COMMAND OT EVAL
I have personally evaluated and examined the patient. The Attending was available to me as a supervising provider if needed.
100% of the time/able to follow single-step instructions

## 2019-09-23 NOTE — OCCUPATIONAL THERAPY INITIAL EVALUATION ADULT - ADDITIONAL COMMENTS
Pt reports that she resides in a  with 5 steps to enter +bathtub. Pt presents as moderately confused. Background info provided is questionable.

## 2019-09-23 NOTE — PROGRESS NOTE ADULT - SUBJECTIVE AND OBJECTIVE BOX
Hospital Day:  2d    Chief Complaint: Patient is a 89y old female with distant history of Colon Ca with liver mets s/p resection 15 years ago, dementia brought in due to left sided weakness    24 hour events:  -No acute events overnight    Subjective:  -Feels fine, still endorsing weakness on L side but improved since admission. Also noticed that speech is back to baseline, no longer dysarthric  -Denies CP, SOB, palpitations, fevers/chills, abdominal pain, nausea/vomiting, diarrhea     Past Medical Hx:   Colon cancer metastasized to liver  No pertinent past medical history    Past Sx:    Allergies:  No Known Allergies    Current Meds:   Standing Meds:  aspirin  chewable 81 milliGRAM(s) Oral daily  atorvastatin 40 milliGRAM(s) Oral at bedtime  chlorhexidine 4% Liquid 1 Application(s) Topical <User Schedule>  enoxaparin Injectable 40 milliGRAM(s) SubCutaneous daily    PRN Meds:  acetaminophen  Suppository .. 650 milliGRAM(s) Rectal every 8 hours PRN Mild Pain (1 - 3)    HOME MEDICATIONS:      Vital Signs:   T(F): 97.2 (09-23-19 @ 05:45), Max: 98.2 (09-22-19 @ 12:27)  HR: 70 (09-23-19 @ 05:45) (70 - 84)  BP: 138/63 (09-23-19 @ 05:45) (138/63 - 162/70)  RR: 18 (09-23-19 @ 05:45) (16 - 18)  SpO2: 98% (09-22-19 @ 22:00) (97% - 98%)      09-22-19 @ 07:01  -  09-23-19 @ 07:00  --------------------------------------------------------  IN: 1275 mL / OUT: 600 mL / NET: 675 mL    Physical Exam:   GENERAL: NAD  HEENT: NCAT  CHEST/LUNG: CTAB  HEART: Regular rate and rhythm; s1 s2 appreciated, No murmurs, rubs, or gallops  ABDOMEN: Soft, Nontender, Nondistended; Bowel sounds present  EXTREMITIES: No LE edema b/l  NERVOUS SYSTEM:  Alert & Oriented X3. Speech is fluent to both naming and repetition. CN2-12 otherwise intact. LUE 3/5 strength, LLE 3-/5. Right side strength 5/5 in both upper and lower extremities. Sensation intact diffusely. no dysmetria on FNF/HTS.    Labs:                         8.3    6.03  )-----------( 216      ( 23 Sep 2019 06:35 )             26.9     Neutophil% 85.3, Lymphocyte% 6.3, Monocyte% 3.3, Bands% 5.1 09-23-19 @ 06:35    23 Sep 2019 06:35    143    |  109    |  15     ----------------------------<  96     4.6     |  24     |  0.8      Ca    8.7        23 Sep 2019 06:35  Mg     2.0       23 Sep 2019 06:35    TPro  6.0    /  Alb  3.5    /  TBili  0.6    /  DBili  x      /  AST  12     /  ALT  <5     /  AlkPhos  63     23 Sep 2019 06:35    Troponin <0.01, CKMB --, CK -- 09-21-19 @ 13:35    LDL 82    Radiology:   < from: MR Head No Cont (09.22.19 @ 08:33) >  IMPRESSION:    Acute/subacute infarction in the right portion of the juno.    Severe chronic microvascular ischemic changes.    < end of copied text >    < from: CT Perfusion w/ Maps w/ IV Cont (09.21.19 @ 18:27) >  IMPRESSION:    1.  No evidence of major vessel stenosis or occlusion. Normal perfusion   images.    2.  Mild atheromatous changes as described.    3.  Tiny (2 mm) focal outpouching of the posterior wall of the distal   cervical right vertebral artery, consider a follow up study for stability.    < end of copied text >    < from: Transthoracic Echocardiogram (09.22.19 @ 10:40) >  Summary:   1. Left ventricular ejection fraction, by visual estimation, is 55-65%.  2. Normal global left ventricular systolic function.   3. Spectral Doppler shows impaired relaxation pattern of left   ventricular myocardial filling (Grade I diastolic dysfunction).   4. Sclerotic aortic valve with normal opening.   5. No evidence of aortic stenosis.   6. Trivial aortic regurgitation.   7. Estimated pulmonary artery systolic pressure is 43.0 mmHg assuming a   right atrial pressure of 8 mmHg, which is consistent with mild pulmonary   hypertension.   8. No evidence of patent foramen ovale.    < end of copied text >      Assessment and Plan:   This is a 89y old female with distant history of Colon Ca with liver mets s/p resection 15 years ago, dementia brought in due to left sided weakness. She was found to have an acute/subacute infarct of the juno. Her exam has improved since admission.    #R pontine acute/subacute infarct: exam improving. LDL 82  -continue with stroke unit monitoring  -switch to ASA 81mg PO (patient passed S+S eval)  -continue with lipitor 40mg PO  -d/c fluids (BP at goal)  -PT/OT/rehab consults pending  -appreciate neuro recs    #Normocytic anemia: stable  -daily CBC  -maintain T+S  -transfuse < 7    #Leukocytosis: resolved  _____________________________________________  DVT ppx: Lovenox subq  GI ppx: not indicated  Code Status: full code  Diet: dysphagia     DISPO: pending further monitoring. SNF vs 4A Hospital Day:  2d    Chief Complaint: Patient is a 89y old female with distant history of Colon Ca with liver mets s/p resection 15 years ago, dementia brought in due to left sided weakness    24 hour events:  -No acute events overnight    Subjective:  -Feels fine, still endorsing weakness on L side but improved since admission. Also noticed that speech is back to baseline, no longer dysarthric  -Denies CP, SOB, palpitations, fevers/chills, abdominal pain, nausea/vomiting, diarrhea     Past Medical Hx:   Colon cancer metastasized to liver  No pertinent past medical history    Past Sx:    Allergies:  No Known Allergies    Current Meds:   Standing Meds:  aspirin  chewable 81 milliGRAM(s) Oral daily  atorvastatin 40 milliGRAM(s) Oral at bedtime  chlorhexidine 4% Liquid 1 Application(s) Topical <User Schedule>  enoxaparin Injectable 40 milliGRAM(s) SubCutaneous daily    PRN Meds:  acetaminophen  Suppository .. 650 milliGRAM(s) Rectal every 8 hours PRN Mild Pain (1 - 3)    HOME MEDICATIONS:      Vital Signs:   T(F): 97.2 (09-23-19 @ 05:45), Max: 98.2 (09-22-19 @ 12:27)  HR: 70 (09-23-19 @ 05:45) (70 - 84)  BP: 138/63 (09-23-19 @ 05:45) (138/63 - 162/70)  RR: 18 (09-23-19 @ 05:45) (16 - 18)  SpO2: 98% (09-22-19 @ 22:00) (97% - 98%)      09-22-19 @ 07:01  -  09-23-19 @ 07:00  --------------------------------------------------------  IN: 1275 mL / OUT: 600 mL / NET: 675 mL    Physical Exam:   GENERAL: NAD  HEENT: NCAT  CHEST/LUNG: CTAB  HEART: Regular rate and rhythm; s1 s2 appreciated, No murmurs, rubs, or gallops  ABDOMEN: Soft, Nontender, Nondistended; Bowel sounds present  EXTREMITIES: No LE edema b/l  NERVOUS SYSTEM:  Alert & Oriented X3. Speech is fluent to both naming and repetition. CN2-12 otherwise intact. LUE 3/5 strength, LLE 3-/5. Right side strength 5/5 in both upper and lower extremities. Sensation intact diffusely. no dysmetria on FNF/HTS.    Labs:                         8.3    6.03  )-----------( 216      ( 23 Sep 2019 06:35 )             26.9     Neutophil% 85.3, Lymphocyte% 6.3, Monocyte% 3.3, Bands% 5.1 09-23-19 @ 06:35    23 Sep 2019 06:35    143    |  109    |  15     ----------------------------<  96     4.6     |  24     |  0.8      Ca    8.7        23 Sep 2019 06:35  Mg     2.0       23 Sep 2019 06:35    TPro  6.0    /  Alb  3.5    /  TBili  0.6    /  DBili  x      /  AST  12     /  ALT  <5     /  AlkPhos  63     23 Sep 2019 06:35    Troponin <0.01, CKMB --, CK -- 09-21-19 @ 13:35    LDL 82    Radiology:   < from: MR Head No Cont (09.22.19 @ 08:33) >  IMPRESSION:    Acute/subacute infarction in the right portion of the juno.    Severe chronic microvascular ischemic changes.    < end of copied text >    < from: CT Perfusion w/ Maps w/ IV Cont (09.21.19 @ 18:27) >  IMPRESSION:    1.  No evidence of major vessel stenosis or occlusion. Normal perfusion   images.    2.  Mild atheromatous changes as described.    3.  Tiny (2 mm) focal outpouching of the posterior wall of the distal   cervical right vertebral artery, consider a follow up study for stability.    < end of copied text >    < from: Transthoracic Echocardiogram (09.22.19 @ 10:40) >  Summary:   1. Left ventricular ejection fraction, by visual estimation, is 55-65%.  2. Normal global left ventricular systolic function.   3. Spectral Doppler shows impaired relaxation pattern of left   ventricular myocardial filling (Grade I diastolic dysfunction).   4. Sclerotic aortic valve with normal opening.   5. No evidence of aortic stenosis.   6. Trivial aortic regurgitation.   7. Estimated pulmonary artery systolic pressure is 43.0 mmHg assuming a   right atrial pressure of 8 mmHg, which is consistent with mild pulmonary   hypertension.   8. No evidence of patent foramen ovale.    < end of copied text >

## 2019-09-24 ENCOUNTER — TRANSCRIPTION ENCOUNTER (OUTPATIENT)
Age: 84
End: 2019-09-24

## 2019-09-24 LAB
ALBUMIN SERPL ELPH-MCNC: 3.4 G/DL — LOW (ref 3.5–5.2)
ALP SERPL-CCNC: 59 U/L — SIGNIFICANT CHANGE UP (ref 30–115)
ALT FLD-CCNC: <5 U/L — SIGNIFICANT CHANGE UP (ref 0–41)
ANION GAP SERPL CALC-SCNC: 13 MMOL/L — SIGNIFICANT CHANGE UP (ref 7–14)
AST SERPL-CCNC: 13 U/L — SIGNIFICANT CHANGE UP (ref 0–41)
BASOPHILS # BLD AUTO: 0.01 K/UL — SIGNIFICANT CHANGE UP (ref 0–0.2)
BASOPHILS # BLD AUTO: 0.02 K/UL — SIGNIFICANT CHANGE UP (ref 0–0.2)
BASOPHILS NFR BLD AUTO: 0.1 % — SIGNIFICANT CHANGE UP (ref 0–1)
BASOPHILS NFR BLD AUTO: 0.2 % — SIGNIFICANT CHANGE UP (ref 0–1)
BILIRUB SERPL-MCNC: 0.5 MG/DL — SIGNIFICANT CHANGE UP (ref 0.2–1.2)
BLD GP AB SCN SERPL QL: SIGNIFICANT CHANGE UP
BUN SERPL-MCNC: 17 MG/DL — SIGNIFICANT CHANGE UP (ref 10–20)
CALCIUM SERPL-MCNC: 8.6 MG/DL — SIGNIFICANT CHANGE UP (ref 8.5–10.1)
CHLORIDE SERPL-SCNC: 105 MMOL/L — SIGNIFICANT CHANGE UP (ref 98–110)
CO2 SERPL-SCNC: 23 MMOL/L — SIGNIFICANT CHANGE UP (ref 17–32)
CREAT SERPL-MCNC: 0.8 MG/DL — SIGNIFICANT CHANGE UP (ref 0.7–1.5)
EOSINOPHIL # BLD AUTO: 0.01 K/UL — SIGNIFICANT CHANGE UP (ref 0–0.7)
EOSINOPHIL # BLD AUTO: 0.01 K/UL — SIGNIFICANT CHANGE UP (ref 0–0.7)
EOSINOPHIL NFR BLD AUTO: 0.1 % — SIGNIFICANT CHANGE UP (ref 0–8)
EOSINOPHIL NFR BLD AUTO: 0.1 % — SIGNIFICANT CHANGE UP (ref 0–8)
GLUCOSE SERPL-MCNC: 93 MG/DL — SIGNIFICANT CHANGE UP (ref 70–99)
HCT VFR BLD CALC: 25.1 % — LOW (ref 37–47)
HCT VFR BLD CALC: 29 % — LOW (ref 37–47)
HGB BLD-MCNC: 7.6 G/DL — LOW (ref 12–16)
HGB BLD-MCNC: 9 G/DL — LOW (ref 12–16)
IMM GRANULOCYTES NFR BLD AUTO: 5.2 % — HIGH (ref 0.1–0.3)
IMM GRANULOCYTES NFR BLD AUTO: 5.7 % — HIGH (ref 0.1–0.3)
IRON SATN MFR SERPL: 20 % — SIGNIFICANT CHANGE UP (ref 15–50)
IRON SATN MFR SERPL: 42 UG/DL — SIGNIFICANT CHANGE UP (ref 35–150)
LDH SERPL L TO P-CCNC: 350 — HIGH (ref 50–242)
LYMPHOCYTES # BLD AUTO: 0.43 K/UL — LOW (ref 1.2–3.4)
LYMPHOCYTES # BLD AUTO: 0.56 K/UL — LOW (ref 1.2–3.4)
LYMPHOCYTES # BLD AUTO: 4.9 % — LOW (ref 20.5–51.1)
LYMPHOCYTES # BLD AUTO: 5.9 % — LOW (ref 20.5–51.1)
MCHC RBC-ENTMCNC: 25.9 PG — LOW (ref 27–31)
MCHC RBC-ENTMCNC: 26.1 PG — LOW (ref 27–31)
MCHC RBC-ENTMCNC: 30.3 G/DL — LOW (ref 32–37)
MCHC RBC-ENTMCNC: 31 G/DL — LOW (ref 32–37)
MCV RBC AUTO: 83.6 FL — SIGNIFICANT CHANGE UP (ref 81–99)
MCV RBC AUTO: 86.3 FL — SIGNIFICANT CHANGE UP (ref 81–99)
MONOCYTES # BLD AUTO: 0.19 K/UL — SIGNIFICANT CHANGE UP (ref 0.1–0.6)
MONOCYTES # BLD AUTO: 0.31 K/UL — SIGNIFICANT CHANGE UP (ref 0.1–0.6)
MONOCYTES NFR BLD AUTO: 2.6 % — SIGNIFICANT CHANGE UP (ref 1.7–9.3)
MONOCYTES NFR BLD AUTO: 2.7 % — SIGNIFICANT CHANGE UP (ref 1.7–9.3)
NEUTROPHILS # BLD AUTO: 6.22 K/UL — SIGNIFICANT CHANGE UP (ref 1.4–6.5)
NEUTROPHILS # BLD AUTO: 9.9 K/UL — HIGH (ref 1.4–6.5)
NEUTROPHILS NFR BLD AUTO: 86.1 % — HIGH (ref 42.2–75.2)
NEUTROPHILS NFR BLD AUTO: 86.4 % — HIGH (ref 42.2–75.2)
NRBC # BLD: 0 /100 WBCS — SIGNIFICANT CHANGE UP (ref 0–0)
NRBC # BLD: 0 /100 WBCS — SIGNIFICANT CHANGE UP (ref 0–0)
PLATELET # BLD AUTO: 203 K/UL — SIGNIFICANT CHANGE UP (ref 130–400)
PLATELET # BLD AUTO: 331 K/UL — SIGNIFICANT CHANGE UP (ref 130–400)
POTASSIUM SERPL-MCNC: 4.5 MMOL/L — SIGNIFICANT CHANGE UP (ref 3.5–5)
POTASSIUM SERPL-SCNC: 4.5 MMOL/L — SIGNIFICANT CHANGE UP (ref 3.5–5)
PROT SERPL-MCNC: 5.8 G/DL — LOW (ref 6–8)
RBC # BLD: 2.91 M/UL — LOW (ref 4.2–5.4)
RBC # BLD: 3.47 M/UL — LOW (ref 4.2–5.4)
RBC # BLD: 3.47 M/UL — LOW (ref 4.2–5.4)
RBC # FLD: 21.5 % — HIGH (ref 11.5–14.5)
RBC # FLD: 21.8 % — HIGH (ref 11.5–14.5)
RETICS #: 52.1 K/UL — SIGNIFICANT CHANGE UP (ref 25–125)
RETICS/RBC NFR: 1.5 % — SIGNIFICANT CHANGE UP (ref 0.5–1.5)
SODIUM SERPL-SCNC: 141 MMOL/L — SIGNIFICANT CHANGE UP (ref 135–146)
TIBC SERPL-MCNC: 211 UG/DL — LOW (ref 220–430)
UIBC SERPL-MCNC: 169 UG/DL — SIGNIFICANT CHANGE UP (ref 110–370)
WBC # BLD: 11.45 K/UL — HIGH (ref 4.8–10.8)
WBC # BLD: 7.24 K/UL — SIGNIFICANT CHANGE UP (ref 4.8–10.8)
WBC # FLD AUTO: 11.45 K/UL — HIGH (ref 4.8–10.8)
WBC # FLD AUTO: 7.24 K/UL — SIGNIFICANT CHANGE UP (ref 4.8–10.8)

## 2019-09-24 PROCEDURE — 99232 SBSQ HOSP IP/OBS MODERATE 35: CPT

## 2019-09-24 PROCEDURE — 95819 EEG AWAKE AND ASLEEP: CPT | Mod: 26

## 2019-09-24 RX ORDER — ENOXAPARIN SODIUM 100 MG/ML
40 INJECTION SUBCUTANEOUS
Qty: 0 | Refills: 0 | DISCHARGE
Start: 2019-09-24

## 2019-09-24 RX ORDER — ATORVASTATIN CALCIUM 80 MG/1
1 TABLET, FILM COATED ORAL
Qty: 0 | Refills: 0 | DISCHARGE
Start: 2019-09-24

## 2019-09-24 RX ORDER — CEFPODOXIME PROXETIL 100 MG
200 TABLET ORAL
Refills: 0 | Status: DISCONTINUED | OUTPATIENT
Start: 2019-09-24 | End: 2019-09-25

## 2019-09-24 RX ORDER — CEFPODOXIME PROXETIL 100 MG
200 TABLET ORAL
Qty: 0 | Refills: 0 | DISCHARGE
Start: 2019-09-24

## 2019-09-24 RX ORDER — CEFPODOXIME PROXETIL 100 MG
TABLET ORAL
Refills: 0 | Status: DISCONTINUED | OUTPATIENT
Start: 2019-09-24 | End: 2019-09-25

## 2019-09-24 RX ORDER — CEFPODOXIME PROXETIL 100 MG
200 TABLET ORAL ONCE
Refills: 0 | Status: COMPLETED | OUTPATIENT
Start: 2019-09-24 | End: 2019-09-24

## 2019-09-24 RX ORDER — ASPIRIN/CALCIUM CARB/MAGNESIUM 324 MG
1 TABLET ORAL
Qty: 0 | Refills: 0 | DISCHARGE
Start: 2019-09-24

## 2019-09-24 RX ORDER — CEFPODOXIME PROXETIL 100 MG
250 TABLET ORAL EVERY 12 HOURS
Refills: 0 | Status: DISCONTINUED | OUTPATIENT
Start: 2019-09-24 | End: 2019-09-24

## 2019-09-24 RX ORDER — ENOXAPARIN SODIUM 100 MG/ML
0 INJECTION SUBCUTANEOUS
Qty: 0 | Refills: 0 | DISCHARGE
Start: 2019-09-24

## 2019-09-24 RX ORDER — CEFPODOXIME PROXETIL 100 MG
200 TABLET ORAL EVERY 12 HOURS
Refills: 0 | Status: DISCONTINUED | OUTPATIENT
Start: 2019-09-24 | End: 2019-09-24

## 2019-09-24 RX ORDER — CEFPODOXIME PROXETIL 100 MG
250 TABLET ORAL
Qty: 0 | Refills: 0 | DISCHARGE
Start: 2019-09-24

## 2019-09-24 RX ADMIN — ATORVASTATIN CALCIUM 40 MILLIGRAM(S): 80 TABLET, FILM COATED ORAL at 21:33

## 2019-09-24 RX ADMIN — ENOXAPARIN SODIUM 40 MILLIGRAM(S): 100 INJECTION SUBCUTANEOUS at 12:04

## 2019-09-24 RX ADMIN — Medication 200 MILLIGRAM(S): at 12:03

## 2019-09-24 RX ADMIN — CHLORHEXIDINE GLUCONATE 1 APPLICATION(S): 213 SOLUTION TOPICAL at 06:00

## 2019-09-24 RX ADMIN — Medication 81 MILLIGRAM(S): at 12:04

## 2019-09-24 NOTE — PROGRESS NOTE ADULT - ASSESSMENT
Assessment and Plan:   This is a 89y old female with distant history of Colon Ca with liver mets s/p resection 15 years ago, dementia brought in due to left sided weakness. She was found to have an acute/subacute infarct of the juno. Her exam has improved since admission.    #R pontine acute/subacute infarct: exam improving. LDL 82  -okay to downgrade from stroke unit  -continue with ASA 81mg PO  -continue with lipitor 40mg PO  -appreciate neuro recs    #Normocytic anemia: Hgb downtrending  -will send iron studies, retic count, LDH, haptoglobin, B12, folate, and repeat CBC at 4pm  -transfusion threshold of 7.5  -maintain T+S    #Leukocytosis: resolved  _____________________________________________  DVT ppx: Lovenox subq  GI ppx: not indicated  Code Status: full code  Diet: dysphagia     DISPO: pending anemia panel. Approved for 4A

## 2019-09-24 NOTE — DISCHARGE NOTE PROVIDER - CARE PROVIDER_API CALL
Kaleb Tenorio)  Neurology; Vascular Neurology  [address] [place] [address], [address] [id number]  Phone: [phone]  Fax: [phone]  Follow Up Time: 1 month

## 2019-09-24 NOTE — DISCHARGE NOTE PROVIDER - NSDCCPCAREPLAN_GEN_ALL_CORE_FT
PRINCIPAL DISCHARGE DIAGNOSIS  Diagnosis: CVA (cerebral vascular accident)  Assessment and Plan of Treatment: You wer eadmitted with left sided weakness and difficulty speaking that was concerning for a stroke. You were subsequently found to have a stroke in the brainstem on MRI. Your weakness gradually improved, as did your difficulty speaking. You were started on aspirin and lipitor - it is very important that you keep taking these medications as prescribed as they will reduce the risk of further strokes. Please work in rehab to gain your strength back further.  Please follow-up with a neurologist within 2-4 weeks of discharge.  If you experience any difficulty speaking, facial droop, weakness in one part of your body, vision changes, loss of consciousness, or severe headache associated with nausea/vomiting, please come back to the ED immediately.      SECONDARY DISCHARGE DIAGNOSES  Diagnosis: Acute UTI  Assessment and Plan of Treatment: You were found to have a UTI on this admission, for which you were started on a 5 day course of oral antibiotics. Please continue taking these as prescribed. Please seek medical help if you experience any fevers/chills, flank pain, or nausea/vomiting.    Diagnosis: Vertebral artery aneurysm  Assessment and Plan of Treatment: You were found incidentally to have a very small outpouching of one of the arteries supplying your brain. You will require monitoring of this outpouching to make sure it does not get bigger as it can cause a bleed if it ruptures. Please follow-up with Dr. Tenorio in 2-4 weeks after discharge for further evaluation. PRINCIPAL DISCHARGE DIAGNOSIS  Diagnosis: CVA (cerebral vascular accident)  Assessment and Plan of Treatment: You wer eadmitted with left sided weakness and difficulty speaking that was concerning for a stroke. You were subsequently found to have a stroke in the brainstem on MRI. Your weakness gradually improved, as did your difficulty speaking. You were started on aspirin and lipitor - it is very important that you keep taking these medications as prescribed as they will reduce the risk of further strokes. Please work in rehab to gain your strength back further.  Please follow-up with a neurologist within 2-4 weeks of discharge.  If you experience any difficulty speaking, facial droop, weakness in one part of your body, vision changes, loss of consciousness, or severe headache associated with nausea/vomiting, please come back to the ED immediately.      SECONDARY DISCHARGE DIAGNOSES  Diagnosis: Anemia  Assessment and Plan of Treatment: You were found to be anemic on admission, which worsened slightly during your hospital stay. You were given 1 unit of red blood cells. you will need repeat blood counts to ensure your hemoglobin count is not dropping further. Please speak to a doctor if you experience any chest pain or shortness of breath.    Diagnosis: Acute UTI  Assessment and Plan of Treatment: You were found to have a UTI on this admission, for which you were started on a 5 day course of oral antibiotics. Please continue taking these as prescribed. Please seek medical help if you experience any fevers/chills, flank pain, or nausea/vomiting.    Diagnosis: Vertebral artery aneurysm  Assessment and Plan of Treatment: You were found incidentally to have a very small outpouching of one of the arteries supplying your brain. You will require monitoring of this outpouching to make sure it does not get bigger as it can cause a bleed if it ruptures. Please follow-up with Dr. Tenorio in 2-4 weeks after discharge for further evaluation.

## 2019-09-24 NOTE — DISCHARGE NOTE PROVIDER - NSDCFUADDAPPT_GEN_ALL_CORE_FT
Please follow-up with Dr. Tenorio in 2-4 weeks - he will further evaluate the small outpouching in your vertebral artery.     Please follow-up with a neurologist 2-4 weeks after discharge. The name/number of the neurology clinic here at the hospital has been provided for you.

## 2019-09-24 NOTE — DISCHARGE NOTE PROVIDER - NSFOLLOWUPCLINICS_GEN_ALL_ED_FT
[facility]  Neurology  [address] [place] [address], [facility] [address]  [place] [address]  Phone: [phone]  Fax:   Follow Up Time:

## 2019-09-24 NOTE — DISCHARGE NOTE NURSING/CASE MANAGEMENT/SOCIAL WORK - PATIENT PORTAL LINK FT
You can access the FollowMyHealth Patient Portal offered by [facility] by registering at the following website: [email or web address]. By joining [facility]’s FollowMyHealth portal, you will also be able to view your health information using other applications (apps) compatible with our system.

## 2019-09-24 NOTE — DISCHARGE NOTE PROVIDER - HOSPITAL COURSE
89 year old female with distant history of Colon Ca with liver mets s/p resection 15 years ago, dementia brought in due to left sided weakness. Patient lives at home alone, was called by daughter around 1 PM yesterday and was fine. Daughter came to visit her today, and found her in bed unable to move left side and having difficulty speaking. Daughter states that patient goes to bed around 10, so that's likely time last known well.     Currently patient denies any complaints.    Per daughter, at baseline patient ambulates at home, has mild dementia, with problems with short term memory.        She was not a candidate for TPA on admission as she was outside of the window. She was loaded with aspirin and started on lipitor 80mg. n subsequent MRI, she was found to have an acute/subacute infarct of the right juno. She had a small 2mm outpouching in distal right vertebral artery on CTA. Her TTE demonstrated a normal EF, grade 1 diastolic dysfunction, and mild pulmonary hypertension. Her neurological exam remained stable - her residual deficits are mild L sided weakness and dysarthria. She was also started on empiric vantin for a suspected UTI - cultures/sensitivities are still pending at time of discharge. At this time, she is stable for and amenable to discharge to inpatient rehab. 89 year old female with distant history of Colon Ca with liver mets s/p resection 15 years ago, dementia brought in due to left sided weakness. Patient lives at home alone, was called by daughter around 1 PM yesterday and was fine. Daughter came to visit her today, and found her in bed unable to move left side and having difficulty speaking. Daughter states that patient goes to bed around 10, so that's likely time last known well.     Currently patient denies any complaints.    Per daughter, at baseline patient ambulates at home, has mild dementia, with problems with short term memory.        She was not a candidate for TPA on admission as she was outside of the window. She was loaded with aspirin and started on lipitor 80mg. n subsequent MRI, she was found to have an acute/subacute infarct of the right juno. She had a small 2mm outpouching in distal right vertebral artery on CTA. Her TTE demonstrated a normal EF, grade 1 diastolic dysfunction, and mild pulmonary hypertension. Her neurological exam remained stable - her residual deficits are mild L sided weakness and dysarthria. She was also started on empiric vantin for a suspected UTI - cultures/sensitivities are still pending at time of discharge. She was also found to be anemic, which is stable. There is no signs of active bleeding or hemolysis. Iron studies were normal. She received 1 unit of pRBCs on the floor, and will require repeat CBC within 1 week. At this time, she is stable for and amenable to discharge to inpatient rehab.

## 2019-09-24 NOTE — PROGRESS NOTE ADULT - SUBJECTIVE AND OBJECTIVE BOX
Hospital Day:  3d     Chief Complaint: Patient is a 89y old female with distant history of Colon Ca with liver mets s/p resection 15 years ago, dementia brought in due to left sided weakness    24 hour events:  -No acute events overnight    Subjective:  -Feels fine, no complaints this AM  -Denies CP, SOB, palpitations, fevers/chills, abdominal pain, nausea/vomiting, diarrhea     Past Medical Hx:   Colon cancer metastasized to liver  No pertinent past medical history    Past Sx:    Allergies:  No Known Allergies    Current Meds:   Standing Meds:  aspirin  chewable 81 milliGRAM(s) Oral daily  atorvastatin 40 milliGRAM(s) Oral at bedtime  cefpodoxime 200 milliGRAM(s) Oral two times a day  cefpodoxime      chlorhexidine 4% Liquid 1 Application(s) Topical <User Schedule>  enoxaparin Injectable 40 milliGRAM(s) SubCutaneous daily    PRN Meds:  acetaminophen  Suppository .. 650 milliGRAM(s) Rectal every 8 hours PRN Mild Pain (1 - 3)    HOME MEDICATIONS:  aspirin 81 mg oral tablet, chewable: 1 tab(s) orally once a day  atorvastatin 40 mg oral tablet: 1 tab(s) orally once a day (at bedtime)  cefpodoxime: 200 milligram(s) orally 2 times a day  enoxaparin: 40 milligram(s) subcutaneous once a day (at bedtime)  Toprol-XL 25 mg oral tablet, extended release: 1 tab(s) orally once a day. Hold if SBP &lt; 110 or HR &lt;60      Vital Signs:   T(F): 98 (19 @ 14:01), Max: 98 (19 @ 14:01)  HR: 84 (19 @ 14:01) (78 - 91)  BP: 127/58 (19 @ 14:01) (127/58 - 150/67)  RR: 19 (19 @ 14:01) (18 - 20)  SpO2: 95% (19 @ 12:10) (95% - 97%)      19 @ 07:01  -  19 @ 07:00  --------------------------------------------------------  IN: 37.5 mL / OUT: 0 mL / NET: 37.5 mL        Physical Exam:   GENERAL: NAD  HEENT: NCAT  CHEST/LUNG: CTAB  HEART: Regular rate and rhythm; s1 s2 appreciated, No murmurs, rubs, or gallops  ABDOMEN: Soft, Nontender, Nondistended; Bowel sounds present  EXTREMITIES: No LE edema b/l  NERVOUS SYSTEM:  Alert & Oriented X3. Speech is fluent to both naming and repetition. CN2-12 otherwise intact. LUE 4/5 strength, LLE 4-/5. Right side strength 5/5 in both upper and lower extremities. Sensation intact diffusely. no dysmetria on FNF.    Labs:                         7.6    7.24  )-----------( 203      ( 24 Sep 2019 04:30 )             25.1     Neutophil% 86.1, Lymphocyte% 5.9, Monocyte% 2.6, Bands% 5.2 19 @ 04:30    24 Sep 2019 04:30    141    |  105    |  17     ----------------------------<  93     4.5     |  23     |  0.8      Ca    8.6        24 Sep 2019 04:30  Mg     2.0       23 Sep 2019 06:35    TPro  5.8    /  Alb  3.4    /  TBili  0.5    /  DBili  x      /  AST  13     /  ALT  <5     /  AlkPhos  59     24 Sep 2019 04:30    Troponin <0.01, CKMB --, CK -- 19 @ 13:35    Urinalysis Basic - ( 23 Sep 2019 16:30 )    Color: Yellow / Appearance: Turbid / S.024 / pH: x  Gluc: x / Ketone: Negative  / Bili: Negative / Urobili: <2 mg/dL   Blood: x / Protein: 30 mg/dL / Nitrite: Positive   Leuk Esterase: Large / RBC: 35 /HPF / WBC >720 /HPF   Sq Epi: x / Non Sq Epi: 2 /HPF / Bacteria: Many    Radiology:   < from: MR Head No Cont (19 @ 08:33) >  IMPRESSION:    Acute/subacute infarction in the right portion of the juno.    Severe chronic microvascular ischemic changes.    < end of copied text >    < from: CT Perfusion w/ Maps w/ IV Cont (19 @ 18:27) >  IMPRESSION:    1.  No evidence of major vessel stenosis or occlusion. Normal perfusion   images.    2.  Mild atheromatous changes as described.    3.  Tiny (2 mm) focal outpouching of the posterior wall of the distal   cervical right vertebral artery, consider a follow up study for stability.    < end of copied text >    < from: Transthoracic Echocardiogram (19 @ 10:40) >  Summary:   1. Left ventricular ejection fraction, by visual estimation, is 55-65%.  2. Normal global left ventricular systolic function.   3. Spectral Doppler shows impaired relaxation pattern of left   ventricular myocardial filling (Grade I diastolic dysfunction).   4. Sclerotic aortic valve with normal opening.   5. No evidence of aortic stenosis.   6. Trivial aortic regurgitation.   7. Estimated pulmonary artery systolic pressure is 43.0 mmHg assuming a   right atrial pressure of 8 mmHg, which is consistent with mild pulmonary   hypertension.   8. No evidence of patent foramen ovale.    < end of copied text >      Assessment and Plan:   This is a 89y old female with distant history of Colon Ca with liver mets s/p resection 15 years ago, dementia brought in due to left sided weakness. She was found to have an acute/subacute infarct of the juno. Her exam has improved since admission.    #R pontine acute/subacute infarct: exam improving. LDL 82  -okay to downgrade from stroke unit  -continue with ASA 81mg PO  -continue with lipitor 40mg PO  -appreciate neuro recs    #Normocytic anemia: Hgb downtrending  -will send iron studies, retic count, LDH, haptoglobin, B12, folate, and repeat CBC at 4pm  -transfusion threshold of 7.5  -maintain T+S    #Leukocytosis: resolved  _____________________________________________  DVT ppx: Lovenox subq  GI ppx: not indicated  Code Status: full code  Diet: dysphagia     DISPO: pending anemia panel. Approved for 4A Hospital Day:  3d     Chief Complaint: Patient is a 89y old female with distant history of Colon Ca with liver mets s/p resection 15 years ago, dementia brought in due to left sided weakness    24 hour events:  -No acute events overnight    Subjective:  -Feels fine, no complaints this AM  -Denies CP, SOB, palpitations, fevers/chills, abdominal pain, nausea/vomiting, diarrhea     Past Medical Hx:   Colon cancer metastasized to liver  No pertinent past medical history    Past Sx:    Allergies:  No Known Allergies    Current Meds:   Standing Meds:  aspirin  chewable 81 milliGRAM(s) Oral daily  atorvastatin 40 milliGRAM(s) Oral at bedtime  cefpodoxime 200 milliGRAM(s) Oral two times a day  cefpodoxime      chlorhexidine 4% Liquid 1 Application(s) Topical <User Schedule>  enoxaparin Injectable 40 milliGRAM(s) SubCutaneous daily    PRN Meds:  acetaminophen  Suppository .. 650 milliGRAM(s) Rectal every 8 hours PRN Mild Pain (1 - 3)    HOME MEDICATIONS:  aspirin 81 mg oral tablet, chewable: 1 tab(s) orally once a day  atorvastatin 40 mg oral tablet: 1 tab(s) orally once a day (at bedtime)  cefpodoxime: 200 milligram(s) orally 2 times a day  enoxaparin: 40 milligram(s) subcutaneous once a day (at bedtime)  Toprol-XL 25 mg oral tablet, extended release: 1 tab(s) orally once a day. Hold if SBP &lt; 110 or HR &lt;60      Vital Signs:   T(F): 98 (19 @ 14:01), Max: 98 (19 @ 14:01)  HR: 84 (19 @ 14:01) (78 - 91)  BP: 127/58 (19 @ 14:01) (127/58 - 150/67)  RR: 19 (19 @ 14:01) (18 - 20)  SpO2: 95% (19 @ 12:10) (95% - 97%)      19 @ 07:01  -  19 @ 07:00  --------------------------------------------------------  IN: 37.5 mL / OUT: 0 mL / NET: 37.5 mL        Physical Exam:   GENERAL: NAD  HEENT: NCAT  CHEST/LUNG: CTAB  HEART: Regular rate and rhythm; s1 s2 appreciated, No murmurs, rubs, or gallops  ABDOMEN: Soft, Nontender, Nondistended; Bowel sounds present  EXTREMITIES: No LE edema b/l  NERVOUS SYSTEM:  Alert & Oriented X3. Speech is fluent to both naming and repetition. CN2-12 otherwise intact. LUE 4/5 strength, LLE 4-/5. Right side strength 5/5 in both upper and lower extremities. Sensation intact diffusely. no dysmetria on FNF.    Labs:                         7.6    7.24  )-----------( 203      ( 24 Sep 2019 04:30 )             25.1     Neutophil% 86.1, Lymphocyte% 5.9, Monocyte% 2.6, Bands% 5.2 19 @ 04:30    24 Sep 2019 04:30    141    |  105    |  17     ----------------------------<  93     4.5     |  23     |  0.8      Ca    8.6        24 Sep 2019 04:30  Mg     2.0       23 Sep 2019 06:35    TPro  5.8    /  Alb  3.4    /  TBili  0.5    /  DBili  x      /  AST  13     /  ALT  <5     /  AlkPhos  59     24 Sep 2019 04:30    Troponin <0.01, CKMB --, CK -- 19 @ 13:35    Urinalysis Basic - ( 23 Sep 2019 16:30 )    Color: Yellow / Appearance: Turbid / S.024 / pH: x  Gluc: x / Ketone: Negative  / Bili: Negative / Urobili: <2 mg/dL   Blood: x / Protein: 30 mg/dL / Nitrite: Positive   Leuk Esterase: Large / RBC: 35 /HPF / WBC >720 /HPF   Sq Epi: x / Non Sq Epi: 2 /HPF / Bacteria: Many    Radiology:   < from: MR Head No Cont (19 @ 08:33) >  IMPRESSION:    Acute/subacute infarction in the right portion of the juno.    Severe chronic microvascular ischemic changes.    < end of copied text >    < from: CT Perfusion w/ Maps w/ IV Cont (19 @ 18:27) >  IMPRESSION:    1.  No evidence of major vessel stenosis or occlusion. Normal perfusion   images.    2.  Mild atheromatous changes as described.    3.  Tiny (2 mm) focal outpouching of the posterior wall of the distal   cervical right vertebral artery, consider a follow up study for stability.    < end of copied text >    < from: Transthoracic Echocardiogram (19 @ 10:40) >  Summary:   1. Left ventricular ejection fraction, by visual estimation, is 55-65%.  2. Normal global left ventricular systolic function.   3. Spectral Doppler shows impaired relaxation pattern of left   ventricular myocardial filling (Grade I diastolic dysfunction).   4. Sclerotic aortic valve with normal opening.   5. No evidence of aortic stenosis.   6. Trivial aortic regurgitation.   7. Estimated pulmonary artery systolic pressure is 43.0 mmHg assuming a   right atrial pressure of 8 mmHg, which is consistent with mild pulmonary   hypertension.   8. No evidence of patent foramen ovale.    < end of copied text >

## 2019-09-25 ENCOUNTER — INPATIENT (INPATIENT)
Facility: HOSPITAL | Age: 84
LOS: 22 days | Discharge: HOME HEALTH | End: 2019-10-18
Attending: PHYSICAL MEDICINE & REHABILITATION | Admitting: PHYSICAL MEDICINE & REHABILITATION
Payer: MEDICARE

## 2019-09-25 VITALS
TEMPERATURE: 98 F | HEART RATE: 78 BPM | DIASTOLIC BLOOD PRESSURE: 59 MMHG | SYSTOLIC BLOOD PRESSURE: 140 MMHG | OXYGEN SATURATION: 93 % | RESPIRATION RATE: 18 BRPM

## 2019-09-25 LAB
-  AMIKACIN: SIGNIFICANT CHANGE UP
-  AMPICILLIN/SULBACTAM: SIGNIFICANT CHANGE UP
-  AMPICILLIN: SIGNIFICANT CHANGE UP
-  AZTREONAM: SIGNIFICANT CHANGE UP
-  CEFAZOLIN: SIGNIFICANT CHANGE UP
-  CEFEPIME: SIGNIFICANT CHANGE UP
-  CEFOXITIN: SIGNIFICANT CHANGE UP
-  CEFTRIAXONE: SIGNIFICANT CHANGE UP
-  CIPROFLOXACIN: SIGNIFICANT CHANGE UP
-  GENTAMICIN: SIGNIFICANT CHANGE UP
-  IMIPENEM: SIGNIFICANT CHANGE UP
-  LEVOFLOXACIN: SIGNIFICANT CHANGE UP
-  MEROPENEM: SIGNIFICANT CHANGE UP
-  NITROFURANTOIN: SIGNIFICANT CHANGE UP
-  PIPERACILLIN/TAZOBACTAM: SIGNIFICANT CHANGE UP
-  TIGECYCLINE: SIGNIFICANT CHANGE UP
-  TOBRAMYCIN: SIGNIFICANT CHANGE UP
-  TRIMETHOPRIM/SULFAMETHOXAZOLE: SIGNIFICANT CHANGE UP
ALBUMIN SERPL ELPH-MCNC: 3.3 G/DL — LOW (ref 3.5–5.2)
ALP SERPL-CCNC: 58 U/L — SIGNIFICANT CHANGE UP (ref 30–115)
ALT FLD-CCNC: <5 U/L — SIGNIFICANT CHANGE UP (ref 0–41)
ANION GAP SERPL CALC-SCNC: 10 MMOL/L — SIGNIFICANT CHANGE UP (ref 7–14)
AST SERPL-CCNC: 12 U/L — SIGNIFICANT CHANGE UP (ref 0–41)
BASOPHILS # BLD AUTO: 0.01 K/UL — SIGNIFICANT CHANGE UP (ref 0–0.2)
BASOPHILS NFR BLD AUTO: 0.1 % — SIGNIFICANT CHANGE UP (ref 0–1)
BILIRUB SERPL-MCNC: 0.4 MG/DL — SIGNIFICANT CHANGE UP (ref 0.2–1.2)
BUN SERPL-MCNC: 16 MG/DL — SIGNIFICANT CHANGE UP (ref 10–20)
CALCIUM SERPL-MCNC: 8.8 MG/DL — SIGNIFICANT CHANGE UP (ref 8.5–10.1)
CHLORIDE SERPL-SCNC: 106 MMOL/L — SIGNIFICANT CHANGE UP (ref 98–110)
CO2 SERPL-SCNC: 25 MMOL/L — SIGNIFICANT CHANGE UP (ref 17–32)
CREAT SERPL-MCNC: 0.8 MG/DL — SIGNIFICANT CHANGE UP (ref 0.7–1.5)
CULTURE RESULTS: SIGNIFICANT CHANGE UP
EOSINOPHIL # BLD AUTO: 0 K/UL — SIGNIFICANT CHANGE UP (ref 0–0.7)
EOSINOPHIL NFR BLD AUTO: 0 % — SIGNIFICANT CHANGE UP (ref 0–8)
ESTIMATED AVERAGE GLUCOSE: 103 MG/DL — SIGNIFICANT CHANGE UP (ref 68–114)
FERRITIN SERPL-MCNC: 614 NG/ML — HIGH (ref 15–150)
FOLATE SERPL-MCNC: 6.7 NG/ML — SIGNIFICANT CHANGE UP
GLUCOSE SERPL-MCNC: 94 MG/DL — SIGNIFICANT CHANGE UP (ref 70–99)
HAPTOGLOB SERPL-MCNC: 128 MG/DL — SIGNIFICANT CHANGE UP (ref 34–200)
HBA1C BLD-MCNC: 5.2 % — SIGNIFICANT CHANGE UP (ref 4–5.6)
HCT VFR BLD CALC: 23.7 % — LOW (ref 37–47)
HGB BLD-MCNC: 7.5 G/DL — LOW (ref 12–16)
IMM GRANULOCYTES NFR BLD AUTO: 8.5 % — HIGH (ref 0.1–0.3)
LYMPHOCYTES # BLD AUTO: 0.46 K/UL — LOW (ref 1.2–3.4)
LYMPHOCYTES # BLD AUTO: 6.5 % — LOW (ref 20.5–51.1)
MCHC RBC-ENTMCNC: 26.3 PG — LOW (ref 27–31)
MCHC RBC-ENTMCNC: 31.6 G/DL — LOW (ref 32–37)
MCV RBC AUTO: 83.2 FL — SIGNIFICANT CHANGE UP (ref 81–99)
METHOD TYPE: SIGNIFICANT CHANGE UP
MONOCYTES # BLD AUTO: 0.22 K/UL — SIGNIFICANT CHANGE UP (ref 0.1–0.6)
MONOCYTES NFR BLD AUTO: 3.1 % — SIGNIFICANT CHANGE UP (ref 1.7–9.3)
NEUTROPHILS # BLD AUTO: 5.75 K/UL — SIGNIFICANT CHANGE UP (ref 1.4–6.5)
NEUTROPHILS NFR BLD AUTO: 81.8 % — HIGH (ref 42.2–75.2)
NRBC # BLD: 0 /100 WBCS — SIGNIFICANT CHANGE UP (ref 0–0)
ORGANISM # SPEC MICROSCOPIC CNT: SIGNIFICANT CHANGE UP
ORGANISM # SPEC MICROSCOPIC CNT: SIGNIFICANT CHANGE UP
PLATELET # BLD AUTO: 251 K/UL — SIGNIFICANT CHANGE UP (ref 130–400)
POTASSIUM SERPL-MCNC: 4.8 MMOL/L — SIGNIFICANT CHANGE UP (ref 3.5–5)
POTASSIUM SERPL-SCNC: 4.8 MMOL/L — SIGNIFICANT CHANGE UP (ref 3.5–5)
PROT SERPL-MCNC: 5.7 G/DL — LOW (ref 6–8)
RBC # BLD: 2.85 M/UL — LOW (ref 4.2–5.4)
RBC # FLD: 20.9 % — HIGH (ref 11.5–14.5)
SODIUM SERPL-SCNC: 141 MMOL/L — SIGNIFICANT CHANGE UP (ref 135–146)
SPECIMEN SOURCE: SIGNIFICANT CHANGE UP
VIT B12 SERPL-MCNC: 287 PG/ML — SIGNIFICANT CHANGE UP (ref 232–1245)
WBC # BLD: 7.04 K/UL — SIGNIFICANT CHANGE UP (ref 4.8–10.8)
WBC # FLD AUTO: 7.04 K/UL — SIGNIFICANT CHANGE UP (ref 4.8–10.8)

## 2019-09-25 PROCEDURE — 93010 ELECTROCARDIOGRAM REPORT: CPT

## 2019-09-25 PROCEDURE — 99232 SBSQ HOSP IP/OBS MODERATE 35: CPT

## 2019-09-25 PROCEDURE — 99239 HOSP IP/OBS DSCHRG MGMT >30: CPT

## 2019-09-25 PROCEDURE — 99497 ADVNCD CARE PLAN 30 MIN: CPT | Mod: 25

## 2019-09-25 RX ORDER — METOPROLOL TARTRATE 50 MG
25 TABLET ORAL DAILY
Refills: 0 | Status: DISCONTINUED | OUTPATIENT
Start: 2019-09-25 | End: 2019-10-18

## 2019-09-25 RX ORDER — LANOLIN ALCOHOL/MO/W.PET/CERES
3 CREAM (GRAM) TOPICAL AT BEDTIME
Refills: 0 | Status: DISCONTINUED | OUTPATIENT
Start: 2019-09-25 | End: 2019-10-18

## 2019-09-25 RX ORDER — CEFPODOXIME PROXETIL 100 MG
200 TABLET ORAL EVERY 12 HOURS
Refills: 0 | Status: COMPLETED | OUTPATIENT
Start: 2019-09-25 | End: 2019-09-29

## 2019-09-25 RX ORDER — ATORVASTATIN CALCIUM 80 MG/1
40 TABLET, FILM COATED ORAL AT BEDTIME
Refills: 0 | Status: DISCONTINUED | OUTPATIENT
Start: 2019-09-25 | End: 2019-10-18

## 2019-09-25 RX ORDER — ENOXAPARIN SODIUM 100 MG/ML
40 INJECTION SUBCUTANEOUS AT BEDTIME
Refills: 0 | Status: DISCONTINUED | OUTPATIENT
Start: 2019-09-25 | End: 2019-10-18

## 2019-09-25 RX ORDER — MAGNESIUM HYDROXIDE 400 MG/1
30 TABLET, CHEWABLE ORAL DAILY
Refills: 0 | Status: DISCONTINUED | OUTPATIENT
Start: 2019-09-25 | End: 2019-10-18

## 2019-09-25 RX ORDER — PREGABALIN 225 MG/1
1000 CAPSULE ORAL ONCE
Refills: 0 | Status: COMPLETED | OUTPATIENT
Start: 2019-09-25 | End: 2019-09-26

## 2019-09-25 RX ORDER — ACETAMINOPHEN 500 MG
650 TABLET ORAL EVERY 6 HOURS
Refills: 0 | Status: DISCONTINUED | OUTPATIENT
Start: 2019-09-25 | End: 2019-10-18

## 2019-09-25 RX ORDER — ASPIRIN/CALCIUM CARB/MAGNESIUM 324 MG
81 TABLET ORAL DAILY
Refills: 0 | Status: DISCONTINUED | OUTPATIENT
Start: 2019-09-25 | End: 2019-10-18

## 2019-09-25 RX ADMIN — Medication 200 MILLIGRAM(S): at 00:08

## 2019-09-25 RX ADMIN — ENOXAPARIN SODIUM 40 MILLIGRAM(S): 100 INJECTION SUBCUTANEOUS at 21:14

## 2019-09-25 RX ADMIN — ENOXAPARIN SODIUM 40 MILLIGRAM(S): 100 INJECTION SUBCUTANEOUS at 11:38

## 2019-09-25 RX ADMIN — Medication 81 MILLIGRAM(S): at 11:38

## 2019-09-25 RX ADMIN — CHLORHEXIDINE GLUCONATE 1 APPLICATION(S): 213 SOLUTION TOPICAL at 05:44

## 2019-09-25 RX ADMIN — Medication 200 MILLIGRAM(S): at 11:38

## 2019-09-25 RX ADMIN — ATORVASTATIN CALCIUM 40 MILLIGRAM(S): 80 TABLET, FILM COATED ORAL at 21:14

## 2019-09-25 NOTE — PROGRESS NOTE ADULT - PROVIDER SPECIALTY LIST ADULT
Hospitalist
Internal Medicine
Internal Medicine
Neurology
Internal Medicine

## 2019-09-25 NOTE — SWALLOW BEDSIDE ASSESSMENT ADULT - SWALLOW EVAL: RECOMMENDED DIET
Dysphagia Diet III Mechanical soft consistency with cut up meats with thins
dysphagia 3 (soft, cut up) w/ thin liquids
Dysphagia Diet III Mechanical soft consistency with cut up meats with thins

## 2019-09-25 NOTE — PROGRESS NOTE ADULT - SUBJECTIVE AND OBJECTIVE BOX
WILLIAN SETH    Chief Complaint:    Handed    HPI:  89 year old female with distant history of Colon Ca with liver mets s/p resection 15 years ago, dementia brought in due to left sided weakness. Patient lives at home alone, was called by daughter around 1 PM yesterday and was fine. Daughter came to visit her today, and found her in bed unable to move left side and having difficulty speaking. Daughter states that patient goes to bed around 10, so that's likely time last known well.   Currently patient denies any complaints.  Per daughter, at baseline patient ambulates at home, has mild dementia, with problems with short term memory. (21 Sep 2019 18:43)    Patient is examined at the bedside, she is doing well, no acute issues over night. Exam is improving. Patient had a drop in Hemoglobin to 7.5.      Relevant PMH:  [] Prior ischemic stroke/TIA  [] Afib  []CAD  []HTN  []DLD  []DM []PVD []Obesity [] Sedintary lifestyle []CHF  []RADU  []Cancer Hx     Social History: [] Smoking []  Drug Use: []   Alcohol Use:   [] Other:      Possible Location of Stroke:    Possible Cause of Stroke:    Relevant Cerebral Imaging:    Relevant Cervicocerebral Imaging:      CT Perfusion w/ Maps w/ IV Cont:   EXAM:  CT PERFUSION W MAPS IC            PROCEDURE DATE:  09/21/2019            INTERPRETATION:  Clinical History / Reason for exam: Stroke code    Technique: CT angiogram of the head and neck including perfusion study of   the brain.  Contiguous CT axial images of the head and neck were obtained   following the bolus intravenous administration of 120 cc Optiray with   multiple 3-D and MIP reformats with perfusion mapping performed on the   3-D workstation.    Correlation made with accompanyingnoncontrast head CT.    Findings:    Perfusion:  There is no evidence of focal perfusion deficit to suggest   acute cerebral ischemia.    CTA neck: The visualized aortic arch and great vessel origins are patent.    There is streak artifact from dental amalgam obscuring evaluation of the   upper cervical vessels at the C1-2 level.    The right common carotid artery is patent. The right carotid bifurcation   is patent. There is a tortuous vascular loop of the right ICA below the   skull base with areas of vascular irregularity, likely on an   atherosclerotic basis. There is likely mild degree of resultant stenosis.    The left common carotid artery is patent. The left carotid bifurcation   demonstrates a mild stenosis of the ECA origin. The ECAorigin is patent.   There is a tortuous vascular loop of the left ICA below the skull base. A   portion of the left ICA is completely obscured but there is good filling   distally.    The vertebral arteries are patent. Tiny (2 mm) focal outpouching arising   from the posterior wall of the V3 segment of the right vertebral artery   on series 4 image 317.    CTA brain: There is calcific plaque of the carotid siphons without   significant stenosis. There is a hypoplastic A1 segment of the right SHAWN,  normal variation.    The distal vertebral arteries are patent. The basilar artery is patent.    There are scattered mild ventral cranial stenoses including the left MCA   distal M1 segment and the right proximal PCA.    Other:    Moderate cervical spine degenerative changes.    IMPRESSION:    1.  No evidence of major vessel stenosis or occlusion. Normal perfusion   images.    2.  Mild atheromatous changes as described.    3.  Tiny (2 mm) focal outpouching of the posterior wall of the distal   cervical right vertebral artery, consider a follow up study for stability.                  JONO JIM M.D., ATTENDING RADIOLOGIST  This document has been electronically signed. Sep 21 2019  7:52PM             (09-21-19 @ 18:27)      Relevant blood tests:  Direct LDL: 82 mg/dL [4 - 129] (09-22-19 @ 06:01)      Relevant cardiac rhythm monitoring:    Relevant Cardiac Structure:(TTE/SRAVAN +/-):[]No intracardiac thrombus/[] no vegetation/[]no [place]/EF:      Home Medications:  aspirin 81 mg oral tablet, chewable: 1 tab(s) orally once a day (24 Sep 2019 11:44)  atorvastatin 40 mg oral tablet: 1 tab(s) orally once a day (at bedtime) (24 Sep 2019 11:44)  cefpodoxime: 200 milligram(s) orally 2 times a day (24 Sep 2019 11:47)  enoxaparin: 40 milligram(s) subcutaneous once a day (at bedtime) (24 Sep 2019 11:45)  Toprol-XL 25 mg oral tablet, extended release: 1 tab(s) orally once a day. Hold if SBP &lt; 110 or HR &lt;60 (24 Sep 2019 11:45)      MEDICATIONS  (STANDING):  aspirin  chewable 81 milliGRAM(s) Oral daily  atorvastatin 40 milliGRAM(s) Oral at bedtime  cefpodoxime 200 milliGRAM(s) Oral two times a day  cefpodoxime      chlorhexidine 4% Liquid 1 Application(s) Topical <User Schedule>  enoxaparin Injectable 40 milliGRAM(s) SubCutaneous daily      PT/OT/Speech/Rehab/S&Swr:    Exam:    Vital Signs Last 24 Hrs  T(C): 35.6 (25 Sep 2019 10:36), Max: 36.7 (24 Sep 2019 14:01)  T(F): 96.1 (25 Sep 2019 10:36), Max: 98 (24 Sep 2019 14:01)  HR: 89 (25 Sep 2019 10:36) (77 - 89)  BP: 142/63 (25 Sep 2019 10:36) (127/58 - 150/67)  BP(mean): --  RR: 17 (25 Sep 2019 10:36) (17 - 19)  SpO2: 95% (24 Sep 2019 12:10) (95% - 95%)    NIHSS      LOC:       1a: 0    1b(Questions):  0         1c(Instructions):0             Best Gaze:0  Visual:0  Motor:                 RUE:  0   RLE: 0    LUE: 2    LLE:1     FACE: 0   Limb Ataxia:0  Sensory:    0   Language:  0     Dysarthria: 1         Extinction and Inattention: 0    NIHSS on admission:          NIHSS yesterday:          NIHSS today:  4           m-RS: 4    Impression:      Suggestion:  Routine stroke workup including:    Disposition:

## 2019-09-25 NOTE — PROGRESS NOTE ADULT - REASON FOR ADMISSION
Left sided weakness

## 2019-09-25 NOTE — GOALS OF CARE CONVERSATION - ADVANCED CARE PLANNING - CONVERSATION DETAILS
pt improving post stroke - still would like active treatment and full code status - improving well to go to inpt rehab

## 2019-09-25 NOTE — SWALLOW BEDSIDE ASSESSMENT ADULT - NS SPL SWALLOW CLINIC TRIAL FT
+ toleration observed without overt symptoms of penetration/aspiration
cough noted x1 during large sip however other appeared to tolerate thin liquids
+toleration w/o overt s/s penetration/aspiration w/ thin liquids

## 2019-09-25 NOTE — PROGRESS NOTE ADULT - ATTENDING COMMENTS
Pt was seen and examined at bedside independently, pt is comfortable and denies any complaints. She was admitted to stroke unit for acute CVA, stable now.   I agree with residents findings, assessment and plan above, f/u neurology recommendations, c/w current medications, PT/Rehab.  Prognosis guarded.     #Progress Note Handoff  Pending (specify):  PT/Rehab, neuro follow up   Family discussion: n/a.   Disposition: Home___/SNFx ___/Other________/Unknown at this time________
Patient seen and examined and agree with above except as noted.  Patient currently improving and able to tolerate solids and liquids.  Has mild left hemiparesis but overall improved from overnight  NIHSS 3    plan as above (Canb be downgraded from stroke unit later tonight or tomorrow morning, PT/OT/Rehab)
Patient seen and examined with PA.  Awaiting rehab.  Exam stable     Plan as above
Patient seen and examined and agree with above except as noted.  Patient improved in LUE and weaker in LLE but stable from yesterday    Plan as above
Pt was seen and examined at bedside independently, she is comfortable and denies any complaint except weakness. Pt has remote h/o colon CA, dropping H/H, will send anemia work up and check repeat hemoglobin level today, transfuse if Hb below 7.5, check stool for occult blood.  I agree with residents findings, assessment and plan above, c/w current medical management, DVt prophylaxis, anticipate discharge in 24 hours to 4A. UA is positive, will start po ABx, encourage po hydration and f/u urine CX , if Cx is negative will d/c Abx     #Progress Note Handoff  Pending (specify):  f/u anemia work up, repeat Hemoglobin level, check stool for occult blood   Family discussion: n/a  Disposition: anticipate discharge to 4A in 24 hours

## 2019-09-25 NOTE — PROGRESS NOTE ADULT - ASSESSMENT
89 y/o woman with PMH colon ca presents with left sided weakness, dysconjugate gaze, mild language deficit and dysarthria. No acute events.   MRI shows acute/subacute infarction in the right juno.    Plan:  Can downgrade from stroke unit and ok for rehab  PT/OT/rehab Speech  continue ASA 81mg QD  continue Lipitor 80 mg QD      Neuroattending note will follow

## 2019-09-25 NOTE — SWALLOW BEDSIDE ASSESSMENT ADULT - SWALLOW EVAL: DIAGNOSIS
mild oral dysphagia for solids and +toleration for thin liquids and soft solids without overt s/s penetration/aspiration
mild oral dysphagia for solids. + toleration observed without overt symptoms of penetration/aspiration for puree, thins, and soft solids
mild oral dysphagia for solids. + toleration observed without overt symptoms of penetration/aspiration for puree, thins, and soft solids

## 2019-09-25 NOTE — SWALLOW BEDSIDE ASSESSMENT ADULT - SLP GENERAL OBSERVATIONS
Pt received sitting upright in bed, alert and oriented x3 w/ no c/o pain.
pt awake ox2 (person place). pt without c/o pain. some confusion noted.
pt awake ox3. pt without c/o pain. some confusion noted.

## 2019-09-25 NOTE — SWALLOW BEDSIDE ASSESSMENT ADULT - SWALLOW EVAL: CURRENT DIET
dysphagia 3 (soft, cut up) w/ thin liquids
npo
Dysphagia Diet III Mechanical soft consistency with cut up meats with thins

## 2019-09-25 NOTE — PROGRESS NOTE ADULT - ASSESSMENT
#acute Right pointine CVA - clinically improving  cont asa/statin  pt/rehab - for 4A rehab today    #anemia - normocytic - appears to be chronic disease  trasnfuse 1 unit prbc today, no signs of active bleeding    #leukocytosis resolved    discussed with daughter at bedside  stable to transfer to rehab  meds reviewed  pt counselled with family  time spent 35 mins

## 2019-09-25 NOTE — SWALLOW BEDSIDE ASSESSMENT ADULT - COMMENTS
+toleration w/o overt s/s penetration/aspiration w/ soft solids mild oral dysphagia w/o overt s/s penetration/aspiration w/ solids

## 2019-09-25 NOTE — PROGRESS NOTE ADULT - SUBJECTIVE AND OBJECTIVE BOX
HOSPITALIST ATTENDING NOTE    WILLIAN SETH  89y Female  [id number]    INTERVAL HPI/OVERNIGHT EVENTS:LUE weakness improved     T(C): 35.6 (09-25-19 @ 10:36), Max: 36.1 (09-25-19 @ 05:24)  HR: 89 (09-25-19 @ 10:36) (77 - 89)  BP: 142/63 (09-25-19 @ 10:36) (139/64 - 147/66)  RR: 17 (09-25-19 @ 10:36) (17 - 18)  SpO2: --  Wt(kg): --    09-25-19 @ 07:01  -  09-25-19 @ 14:12  --------------------------------------------------------  IN: 394 mL / OUT: 150 mL / NET: 244 mL        PHYSICAL EXAM:  GENERAL: NAD  HEAD:  Atraumatic, Normocephalic  EYES: EOMI, PERRLA, conjunctiva and sclera clear  ENMT: No tonsillar erythema, exudates, or enlargement  NECK: Supple, No JVD, Normal thyroid  NERVOUS SYSTEM:  Alert & Oriented X3, LLE weakness, LUE weakness improved  CHEST/LUNG: Clear to ascultation bilaterally; No rales, rhonchi, wheezing,   HEART: Regular rate and rhythm; No murmurs, rubs, or gallops  ABDOMEN: Soft, Nontender, Nondistended; Bowel sounds present  EXTREMITIES: No clubbing, cyanosis, or edema      Consultant(s) Notes Reviewed:  [x ] YES  [ ] NO  Care Discussed with Consultants/Other Providers/ Housestaff [ x] YES  [ ] NO    LABS:                        7.5    7.04  )-----------( 251      ( 25 Sep 2019 05:19 )             23.7     09-25    141  |  106  |  16  ----------------------------<  94  4.8   |  25  |  0.8    Ca    8.8      25 Sep 2019 05:19    TPro  5.7<L>  /  Alb  3.3<L>  /  TBili  0.4  /  DBili  x   /  AST  12  /  ALT  <5  /  AlkPhos  58  09-25      Culture - Urine (collected 23 Sep 2019 16:30)  Source: .Urine Catheterized  Preliminary Report (24 Sep 2019 23:17):    >100,000 CFU/ml Gram Negative Rods          RADIOLOGY & ADDITIONAL TESTS:    Imaging or report Personally Reviewed:  [ ] YES  [ ] NO    Case discussed with resident    Care discussed with pt/family      HEALTH ISSUES - PROBLEM Dx:

## 2019-09-25 NOTE — SWALLOW BEDSIDE ASSESSMENT ADULT - SLP PERTINENT HISTORY OF CURRENT PROBLEM
pt admitted with r/o CVA. pmhx significant for colon ca with liver mets, dementia. pt found to have acute/subacute r pontine infarct

## 2019-09-26 PROBLEM — C18.9 MALIGNANT NEOPLASM OF COLON, UNSPECIFIED: Chronic | Status: ACTIVE | Noted: 2019-09-21

## 2019-09-26 LAB
ALBUMIN SERPL ELPH-MCNC: 3.5 G/DL — SIGNIFICANT CHANGE UP (ref 3.5–5.2)
ALP SERPL-CCNC: 62 U/L — SIGNIFICANT CHANGE UP (ref 30–115)
ALT FLD-CCNC: 9 U/L — SIGNIFICANT CHANGE UP (ref 0–41)
ANION GAP SERPL CALC-SCNC: 10 MMOL/L — SIGNIFICANT CHANGE UP (ref 7–14)
AST SERPL-CCNC: 23 U/L — SIGNIFICANT CHANGE UP (ref 0–41)
BILIRUB SERPL-MCNC: 0.5 MG/DL — SIGNIFICANT CHANGE UP (ref 0.2–1.2)
BUN SERPL-MCNC: 15 MG/DL — SIGNIFICANT CHANGE UP (ref 10–20)
CALCIUM SERPL-MCNC: 8.8 MG/DL — SIGNIFICANT CHANGE UP (ref 8.5–10.1)
CHLORIDE SERPL-SCNC: 104 MMOL/L — SIGNIFICANT CHANGE UP (ref 98–110)
CO2 SERPL-SCNC: 25 MMOL/L — SIGNIFICANT CHANGE UP (ref 17–32)
CREAT SERPL-MCNC: 0.8 MG/DL — SIGNIFICANT CHANGE UP (ref 0.7–1.5)
GLUCOSE SERPL-MCNC: 96 MG/DL — SIGNIFICANT CHANGE UP (ref 70–99)
HCT VFR BLD CALC: 29.3 % — LOW (ref 37–47)
HGB BLD-MCNC: 9.5 G/DL — LOW (ref 12–16)
MAGNESIUM SERPL-MCNC: 1.8 MG/DL — SIGNIFICANT CHANGE UP (ref 1.8–2.4)
MCHC RBC-ENTMCNC: 27 PG — SIGNIFICANT CHANGE UP (ref 27–31)
MCHC RBC-ENTMCNC: 32.4 G/DL — SIGNIFICANT CHANGE UP (ref 32–37)
MCV RBC AUTO: 83.2 FL — SIGNIFICANT CHANGE UP (ref 81–99)
NRBC # BLD: 0 /100 WBCS — SIGNIFICANT CHANGE UP (ref 0–0)
PHOSPHATE SERPL-MCNC: 3.6 MG/DL — SIGNIFICANT CHANGE UP (ref 2.1–4.9)
PLATELET # BLD AUTO: 239 K/UL — SIGNIFICANT CHANGE UP (ref 130–400)
POTASSIUM SERPL-MCNC: 4.7 MMOL/L — SIGNIFICANT CHANGE UP (ref 3.5–5)
POTASSIUM SERPL-SCNC: 4.7 MMOL/L — SIGNIFICANT CHANGE UP (ref 3.5–5)
PROT SERPL-MCNC: 5.9 G/DL — LOW (ref 6–8)
RBC # BLD: 3.52 M/UL — LOW (ref 4.2–5.4)
RBC # FLD: 20 % — HIGH (ref 11.5–14.5)
SODIUM SERPL-SCNC: 139 MMOL/L — SIGNIFICANT CHANGE UP (ref 135–146)
WBC # BLD: 8.23 K/UL — SIGNIFICANT CHANGE UP (ref 4.8–10.8)
WBC # FLD AUTO: 8.23 K/UL — SIGNIFICANT CHANGE UP (ref 4.8–10.8)

## 2019-09-26 RX ADMIN — ENOXAPARIN SODIUM 40 MILLIGRAM(S): 100 INJECTION SUBCUTANEOUS at 21:36

## 2019-09-26 RX ADMIN — Medication 200 MILLIGRAM(S): at 05:28

## 2019-09-26 RX ADMIN — ATORVASTATIN CALCIUM 40 MILLIGRAM(S): 80 TABLET, FILM COATED ORAL at 21:36

## 2019-09-26 RX ADMIN — Medication 81 MILLIGRAM(S): at 12:48

## 2019-09-26 RX ADMIN — Medication 25 MILLIGRAM(S): at 05:28

## 2019-09-26 RX ADMIN — Medication 200 MILLIGRAM(S): at 17:09

## 2019-09-26 RX ADMIN — PREGABALIN 1000 MICROGRAM(S): 225 CAPSULE ORAL at 05:28

## 2019-09-27 DIAGNOSIS — I72.6 ANEURYSM OF VERTEBRAL ARTERY: ICD-10-CM

## 2019-09-27 DIAGNOSIS — R29.810 FACIAL WEAKNESS: ICD-10-CM

## 2019-09-27 DIAGNOSIS — N39.0 URINARY TRACT INFECTION, SITE NOT SPECIFIED: ICD-10-CM

## 2019-09-27 DIAGNOSIS — D72.829 ELEVATED WHITE BLOOD CELL COUNT, UNSPECIFIED: ICD-10-CM

## 2019-09-27 DIAGNOSIS — R53.1 WEAKNESS: ICD-10-CM

## 2019-09-27 DIAGNOSIS — Z85.038 PERSONAL HISTORY OF OTHER MALIGNANT NEOPLASM OF LARGE INTESTINE: ICD-10-CM

## 2019-09-27 DIAGNOSIS — G81.94 HEMIPLEGIA, UNSPECIFIED AFFECTING LEFT NONDOMINANT SIDE: ICD-10-CM

## 2019-09-27 DIAGNOSIS — I45.10 UNSPECIFIED RIGHT BUNDLE-BRANCH BLOCK: ICD-10-CM

## 2019-09-27 DIAGNOSIS — I27.20 PULMONARY HYPERTENSION, UNSPECIFIED: ICD-10-CM

## 2019-09-27 DIAGNOSIS — F03.90 UNSPECIFIED DEMENTIA WITHOUT BEHAVIORAL DISTURBANCE: ICD-10-CM

## 2019-09-27 DIAGNOSIS — R29.714 NIHSS SCORE 14: ICD-10-CM

## 2019-09-27 DIAGNOSIS — Z85.05 PERSONAL HISTORY OF MALIGNANT NEOPLASM OF LIVER: ICD-10-CM

## 2019-09-27 DIAGNOSIS — R47.01 APHASIA: ICD-10-CM

## 2019-09-27 DIAGNOSIS — D64.9 ANEMIA, UNSPECIFIED: ICD-10-CM

## 2019-09-27 DIAGNOSIS — R47.1 DYSARTHRIA AND ANARTHRIA: ICD-10-CM

## 2019-09-27 DIAGNOSIS — I63.89 OTHER CEREBRAL INFARCTION: ICD-10-CM

## 2019-09-27 LAB
HCT VFR BLD CALC: 31.5 % — LOW (ref 37–47)
HGB BLD-MCNC: 10 G/DL — LOW (ref 12–16)
MCHC RBC-ENTMCNC: 26.7 PG — LOW (ref 27–31)
MCHC RBC-ENTMCNC: 31.7 G/DL — LOW (ref 32–37)
MCV RBC AUTO: 84 FL — SIGNIFICANT CHANGE UP (ref 81–99)
NRBC # BLD: 0 /100 WBCS — SIGNIFICANT CHANGE UP (ref 0–0)
PLATELET # BLD AUTO: 240 K/UL — SIGNIFICANT CHANGE UP (ref 130–400)
RBC # BLD: 3.75 M/UL — LOW (ref 4.2–5.4)
RBC # FLD: 20.2 % — HIGH (ref 11.5–14.5)
WBC # BLD: 7.39 K/UL — SIGNIFICANT CHANGE UP (ref 4.8–10.8)
WBC # FLD AUTO: 7.39 K/UL — SIGNIFICANT CHANGE UP (ref 4.8–10.8)

## 2019-09-27 RX ADMIN — Medication 200 MILLIGRAM(S): at 06:16

## 2019-09-27 RX ADMIN — Medication 200 MILLIGRAM(S): at 17:03

## 2019-09-27 RX ADMIN — ENOXAPARIN SODIUM 40 MILLIGRAM(S): 100 INJECTION SUBCUTANEOUS at 21:52

## 2019-09-27 RX ADMIN — Medication 25 MILLIGRAM(S): at 06:17

## 2019-09-27 RX ADMIN — Medication 81 MILLIGRAM(S): at 13:35

## 2019-09-27 RX ADMIN — ATORVASTATIN CALCIUM 40 MILLIGRAM(S): 80 TABLET, FILM COATED ORAL at 21:52

## 2019-09-28 RX ADMIN — Medication 25 MILLIGRAM(S): at 06:27

## 2019-09-28 RX ADMIN — Medication 81 MILLIGRAM(S): at 12:54

## 2019-09-28 RX ADMIN — Medication 200 MILLIGRAM(S): at 06:27

## 2019-09-28 RX ADMIN — Medication 200 MILLIGRAM(S): at 17:08

## 2019-09-28 RX ADMIN — ENOXAPARIN SODIUM 40 MILLIGRAM(S): 100 INJECTION SUBCUTANEOUS at 21:59

## 2019-09-28 RX ADMIN — ATORVASTATIN CALCIUM 40 MILLIGRAM(S): 80 TABLET, FILM COATED ORAL at 21:59

## 2019-09-29 RX ADMIN — ATORVASTATIN CALCIUM 40 MILLIGRAM(S): 80 TABLET, FILM COATED ORAL at 21:49

## 2019-09-29 RX ADMIN — Medication 200 MILLIGRAM(S): at 17:34

## 2019-09-29 RX ADMIN — MAGNESIUM HYDROXIDE 30 MILLILITER(S): 400 TABLET, CHEWABLE ORAL at 18:10

## 2019-09-29 RX ADMIN — Medication 200 MILLIGRAM(S): at 06:09

## 2019-09-29 RX ADMIN — Medication 25 MILLIGRAM(S): at 06:09

## 2019-09-29 RX ADMIN — Medication 81 MILLIGRAM(S): at 12:18

## 2019-09-29 RX ADMIN — ENOXAPARIN SODIUM 40 MILLIGRAM(S): 100 INJECTION SUBCUTANEOUS at 21:50

## 2019-09-30 LAB
ALBUMIN SERPL ELPH-MCNC: 3.7 G/DL — SIGNIFICANT CHANGE UP (ref 3.5–5.2)
ALP SERPL-CCNC: 55 U/L — SIGNIFICANT CHANGE UP (ref 30–115)
ALT FLD-CCNC: 16 U/L — SIGNIFICANT CHANGE UP (ref 0–41)
ANION GAP SERPL CALC-SCNC: 9 MMOL/L — SIGNIFICANT CHANGE UP (ref 7–14)
AST SERPL-CCNC: 22 U/L — SIGNIFICANT CHANGE UP (ref 0–41)
BILIRUB SERPL-MCNC: 0.4 MG/DL — SIGNIFICANT CHANGE UP (ref 0.2–1.2)
BUN SERPL-MCNC: 23 MG/DL — HIGH (ref 10–20)
CALCIUM SERPL-MCNC: 9.1 MG/DL — SIGNIFICANT CHANGE UP (ref 8.5–10.1)
CHLORIDE SERPL-SCNC: 107 MMOL/L — SIGNIFICANT CHANGE UP (ref 98–110)
CO2 SERPL-SCNC: 27 MMOL/L — SIGNIFICANT CHANGE UP (ref 17–32)
CREAT SERPL-MCNC: 0.7 MG/DL — SIGNIFICANT CHANGE UP (ref 0.7–1.5)
GLUCOSE SERPL-MCNC: 91 MG/DL — SIGNIFICANT CHANGE UP (ref 70–99)
HCT VFR BLD CALC: 30.4 % — LOW (ref 37–47)
HGB BLD-MCNC: 9.6 G/DL — LOW (ref 12–16)
MCHC RBC-ENTMCNC: 26.8 PG — LOW (ref 27–31)
MCHC RBC-ENTMCNC: 31.6 G/DL — LOW (ref 32–37)
MCV RBC AUTO: 84.9 FL — SIGNIFICANT CHANGE UP (ref 81–99)
NRBC # BLD: 0 /100 WBCS — SIGNIFICANT CHANGE UP (ref 0–0)
PLATELET # BLD AUTO: 212 K/UL — SIGNIFICANT CHANGE UP (ref 130–400)
POTASSIUM SERPL-MCNC: 5 MMOL/L — SIGNIFICANT CHANGE UP (ref 3.5–5)
POTASSIUM SERPL-SCNC: 5 MMOL/L — SIGNIFICANT CHANGE UP (ref 3.5–5)
PROT SERPL-MCNC: 6.2 G/DL — SIGNIFICANT CHANGE UP (ref 6–8)
RBC # BLD: 3.58 M/UL — LOW (ref 4.2–5.4)
RBC # FLD: 20.4 % — HIGH (ref 11.5–14.5)
SODIUM SERPL-SCNC: 143 MMOL/L — SIGNIFICANT CHANGE UP (ref 135–146)
WBC # BLD: 7.46 K/UL — SIGNIFICANT CHANGE UP (ref 4.8–10.8)
WBC # FLD AUTO: 7.46 K/UL — SIGNIFICANT CHANGE UP (ref 4.8–10.8)

## 2019-09-30 RX ADMIN — ENOXAPARIN SODIUM 40 MILLIGRAM(S): 100 INJECTION SUBCUTANEOUS at 21:19

## 2019-09-30 RX ADMIN — Medication 81 MILLIGRAM(S): at 12:53

## 2019-09-30 RX ADMIN — ATORVASTATIN CALCIUM 40 MILLIGRAM(S): 80 TABLET, FILM COATED ORAL at 21:19

## 2019-09-30 RX ADMIN — Medication 25 MILLIGRAM(S): at 06:09

## 2019-10-01 RX ADMIN — ENOXAPARIN SODIUM 40 MILLIGRAM(S): 100 INJECTION SUBCUTANEOUS at 21:03

## 2019-10-01 RX ADMIN — Medication 25 MILLIGRAM(S): at 06:34

## 2019-10-01 RX ADMIN — Medication 81 MILLIGRAM(S): at 12:14

## 2019-10-01 RX ADMIN — ATORVASTATIN CALCIUM 40 MILLIGRAM(S): 80 TABLET, FILM COATED ORAL at 21:04

## 2019-10-02 PROCEDURE — 74230 X-RAY XM SWLNG FUNCJ C+: CPT | Mod: 26

## 2019-10-02 RX ADMIN — ATORVASTATIN CALCIUM 40 MILLIGRAM(S): 80 TABLET, FILM COATED ORAL at 21:36

## 2019-10-02 RX ADMIN — Medication 81 MILLIGRAM(S): at 12:05

## 2019-10-02 RX ADMIN — ENOXAPARIN SODIUM 40 MILLIGRAM(S): 100 INJECTION SUBCUTANEOUS at 21:36

## 2019-10-02 RX ADMIN — Medication 25 MILLIGRAM(S): at 05:51

## 2019-10-03 RX ADMIN — Medication 25 MILLIGRAM(S): at 05:36

## 2019-10-03 RX ADMIN — ENOXAPARIN SODIUM 40 MILLIGRAM(S): 100 INJECTION SUBCUTANEOUS at 22:23

## 2019-10-03 RX ADMIN — Medication 81 MILLIGRAM(S): at 12:19

## 2019-10-03 RX ADMIN — ATORVASTATIN CALCIUM 40 MILLIGRAM(S): 80 TABLET, FILM COATED ORAL at 22:22

## 2019-10-04 RX ORDER — NYSTATIN 500MM UNIT
500000 POWDER (EA) MISCELLANEOUS THREE TIMES A DAY
Refills: 0 | Status: DISCONTINUED | OUTPATIENT
Start: 2019-10-04 | End: 2019-10-18

## 2019-10-04 RX ADMIN — ENOXAPARIN SODIUM 40 MILLIGRAM(S): 100 INJECTION SUBCUTANEOUS at 21:44

## 2019-10-04 RX ADMIN — ATORVASTATIN CALCIUM 40 MILLIGRAM(S): 80 TABLET, FILM COATED ORAL at 21:44

## 2019-10-04 RX ADMIN — Medication 25 MILLIGRAM(S): at 06:03

## 2019-10-04 RX ADMIN — Medication 500000 UNIT(S): at 21:44

## 2019-10-04 RX ADMIN — Medication 81 MILLIGRAM(S): at 12:27

## 2019-10-04 RX ADMIN — Medication 500000 UNIT(S): at 17:05

## 2019-10-05 RX ADMIN — Medication 81 MILLIGRAM(S): at 11:44

## 2019-10-05 RX ADMIN — Medication 500000 UNIT(S): at 13:22

## 2019-10-05 RX ADMIN — ENOXAPARIN SODIUM 40 MILLIGRAM(S): 100 INJECTION SUBCUTANEOUS at 22:04

## 2019-10-05 RX ADMIN — ATORVASTATIN CALCIUM 40 MILLIGRAM(S): 80 TABLET, FILM COATED ORAL at 22:04

## 2019-10-05 RX ADMIN — Medication 25 MILLIGRAM(S): at 05:58

## 2019-10-05 RX ADMIN — Medication 500000 UNIT(S): at 05:58

## 2019-10-05 RX ADMIN — Medication 500000 UNIT(S): at 22:05

## 2019-10-06 RX ADMIN — Medication 500000 UNIT(S): at 13:00

## 2019-10-06 RX ADMIN — Medication 500000 UNIT(S): at 21:24

## 2019-10-06 RX ADMIN — Medication 25 MILLIGRAM(S): at 05:31

## 2019-10-06 RX ADMIN — Medication 81 MILLIGRAM(S): at 11:55

## 2019-10-06 RX ADMIN — ENOXAPARIN SODIUM 40 MILLIGRAM(S): 100 INJECTION SUBCUTANEOUS at 21:39

## 2019-10-06 RX ADMIN — ATORVASTATIN CALCIUM 40 MILLIGRAM(S): 80 TABLET, FILM COATED ORAL at 21:24

## 2019-10-06 RX ADMIN — Medication 500000 UNIT(S): at 05:32

## 2019-10-07 LAB
24R-OH-CALCIDIOL SERPL-MCNC: 34 NG/ML — SIGNIFICANT CHANGE UP (ref 30–80)
ALBUMIN SERPL ELPH-MCNC: 3.5 G/DL — SIGNIFICANT CHANGE UP (ref 3.5–5.2)
ALP SERPL-CCNC: 56 U/L — SIGNIFICANT CHANGE UP (ref 30–115)
ALT FLD-CCNC: 11 U/L — SIGNIFICANT CHANGE UP (ref 0–41)
ANION GAP SERPL CALC-SCNC: 12 MMOL/L — SIGNIFICANT CHANGE UP (ref 7–14)
AST SERPL-CCNC: 16 U/L — SIGNIFICANT CHANGE UP (ref 0–41)
BILIRUB SERPL-MCNC: 0.5 MG/DL — SIGNIFICANT CHANGE UP (ref 0.2–1.2)
BUN SERPL-MCNC: 21 MG/DL — HIGH (ref 10–20)
CALCIUM SERPL-MCNC: 8.7 MG/DL — SIGNIFICANT CHANGE UP (ref 8.5–10.1)
CHLORIDE SERPL-SCNC: 106 MMOL/L — SIGNIFICANT CHANGE UP (ref 98–110)
CO2 SERPL-SCNC: 25 MMOL/L — SIGNIFICANT CHANGE UP (ref 17–32)
CREAT SERPL-MCNC: 0.7 MG/DL — SIGNIFICANT CHANGE UP (ref 0.7–1.5)
GLUCOSE SERPL-MCNC: 91 MG/DL — SIGNIFICANT CHANGE UP (ref 70–99)
HCT VFR BLD CALC: 26.8 % — LOW (ref 37–47)
HGB BLD-MCNC: 8.4 G/DL — LOW (ref 12–16)
MCHC RBC-ENTMCNC: 26.6 PG — LOW (ref 27–31)
MCHC RBC-ENTMCNC: 31.3 G/DL — LOW (ref 32–37)
MCV RBC AUTO: 84.8 FL — SIGNIFICANT CHANGE UP (ref 81–99)
NRBC # BLD: 0 /100 WBCS — SIGNIFICANT CHANGE UP (ref 0–0)
PLATELET # BLD AUTO: 228 K/UL — SIGNIFICANT CHANGE UP (ref 130–400)
POTASSIUM SERPL-MCNC: 4.7 MMOL/L — SIGNIFICANT CHANGE UP (ref 3.5–5)
POTASSIUM SERPL-SCNC: 4.7 MMOL/L — SIGNIFICANT CHANGE UP (ref 3.5–5)
PROT SERPL-MCNC: 5.9 G/DL — LOW (ref 6–8)
RBC # BLD: 3.16 M/UL — LOW (ref 4.2–5.4)
RBC # FLD: 20.8 % — HIGH (ref 11.5–14.5)
SODIUM SERPL-SCNC: 143 MMOL/L — SIGNIFICANT CHANGE UP (ref 135–146)
WBC # BLD: 6.34 K/UL — SIGNIFICANT CHANGE UP (ref 4.8–10.8)
WBC # FLD AUTO: 6.34 K/UL — SIGNIFICANT CHANGE UP (ref 4.8–10.8)

## 2019-10-07 RX ADMIN — Medication 500000 UNIT(S): at 17:28

## 2019-10-07 RX ADMIN — ATORVASTATIN CALCIUM 40 MILLIGRAM(S): 80 TABLET, FILM COATED ORAL at 21:17

## 2019-10-07 RX ADMIN — Medication 25 MILLIGRAM(S): at 05:07

## 2019-10-07 RX ADMIN — Medication 500000 UNIT(S): at 21:17

## 2019-10-07 RX ADMIN — Medication 500000 UNIT(S): at 05:07

## 2019-10-07 RX ADMIN — Medication 81 MILLIGRAM(S): at 17:28

## 2019-10-07 RX ADMIN — ENOXAPARIN SODIUM 40 MILLIGRAM(S): 100 INJECTION SUBCUTANEOUS at 21:17

## 2019-10-08 RX ADMIN — Medication 25 MILLIGRAM(S): at 05:52

## 2019-10-08 RX ADMIN — Medication 500000 UNIT(S): at 05:53

## 2019-10-08 RX ADMIN — ENOXAPARIN SODIUM 40 MILLIGRAM(S): 100 INJECTION SUBCUTANEOUS at 22:57

## 2019-10-08 RX ADMIN — ATORVASTATIN CALCIUM 40 MILLIGRAM(S): 80 TABLET, FILM COATED ORAL at 22:56

## 2019-10-08 RX ADMIN — Medication 500000 UNIT(S): at 15:00

## 2019-10-08 RX ADMIN — Medication 81 MILLIGRAM(S): at 12:57

## 2019-10-08 RX ADMIN — Medication 500000 UNIT(S): at 22:57

## 2019-10-09 RX ADMIN — Medication 25 MILLIGRAM(S): at 06:04

## 2019-10-09 RX ADMIN — Medication 500000 UNIT(S): at 22:12

## 2019-10-09 RX ADMIN — ATORVASTATIN CALCIUM 40 MILLIGRAM(S): 80 TABLET, FILM COATED ORAL at 22:10

## 2019-10-09 RX ADMIN — ENOXAPARIN SODIUM 40 MILLIGRAM(S): 100 INJECTION SUBCUTANEOUS at 22:11

## 2019-10-09 RX ADMIN — Medication 81 MILLIGRAM(S): at 11:56

## 2019-10-09 RX ADMIN — Medication 500000 UNIT(S): at 06:04

## 2019-10-09 RX ADMIN — Medication 500000 UNIT(S): at 14:32

## 2019-10-10 RX ADMIN — Medication 500000 UNIT(S): at 21:24

## 2019-10-10 RX ADMIN — Medication 500000 UNIT(S): at 06:07

## 2019-10-10 RX ADMIN — Medication 81 MILLIGRAM(S): at 11:58

## 2019-10-10 RX ADMIN — ATORVASTATIN CALCIUM 40 MILLIGRAM(S): 80 TABLET, FILM COATED ORAL at 21:24

## 2019-10-10 RX ADMIN — Medication 25 MILLIGRAM(S): at 06:07

## 2019-10-10 RX ADMIN — ENOXAPARIN SODIUM 40 MILLIGRAM(S): 100 INJECTION SUBCUTANEOUS at 21:24

## 2019-10-10 RX ADMIN — Medication 500000 UNIT(S): at 12:48

## 2019-10-11 RX ORDER — DOCUSATE SODIUM 100 MG
100 CAPSULE ORAL THREE TIMES A DAY
Refills: 0 | Status: DISCONTINUED | OUTPATIENT
Start: 2019-10-11 | End: 2019-10-18

## 2019-10-11 RX ORDER — SENNA PLUS 8.6 MG/1
2 TABLET ORAL AT BEDTIME
Refills: 0 | Status: DISCONTINUED | OUTPATIENT
Start: 2019-10-11 | End: 2019-10-18

## 2019-10-11 RX ADMIN — ATORVASTATIN CALCIUM 40 MILLIGRAM(S): 80 TABLET, FILM COATED ORAL at 21:39

## 2019-10-11 RX ADMIN — Medication 500000 UNIT(S): at 06:06

## 2019-10-11 RX ADMIN — ENOXAPARIN SODIUM 40 MILLIGRAM(S): 100 INJECTION SUBCUTANEOUS at 21:40

## 2019-10-11 RX ADMIN — Medication 100 MILLIGRAM(S): at 21:39

## 2019-10-11 RX ADMIN — Medication 81 MILLIGRAM(S): at 12:16

## 2019-10-11 RX ADMIN — Medication 25 MILLIGRAM(S): at 06:06

## 2019-10-11 RX ADMIN — Medication 100 MILLIGRAM(S): at 14:44

## 2019-10-11 RX ADMIN — Medication 500000 UNIT(S): at 14:45

## 2019-10-11 RX ADMIN — Medication 500000 UNIT(S): at 21:39

## 2019-10-11 RX ADMIN — SENNA PLUS 2 TABLET(S): 8.6 TABLET ORAL at 21:39

## 2019-10-12 RX ORDER — BACITRACIN ZINC 500 UNIT/G
1 OINTMENT IN PACKET (EA) TOPICAL
Refills: 0 | Status: DISCONTINUED | OUTPATIENT
Start: 2019-10-12 | End: 2019-10-18

## 2019-10-12 RX ADMIN — Medication 100 MILLIGRAM(S): at 14:55

## 2019-10-12 RX ADMIN — Medication 100 MILLIGRAM(S): at 22:41

## 2019-10-12 RX ADMIN — ENOXAPARIN SODIUM 40 MILLIGRAM(S): 100 INJECTION SUBCUTANEOUS at 22:42

## 2019-10-12 RX ADMIN — Medication 81 MILLIGRAM(S): at 11:59

## 2019-10-12 RX ADMIN — Medication 100 MILLIGRAM(S): at 06:13

## 2019-10-12 RX ADMIN — Medication 1 APPLICATION(S): at 17:19

## 2019-10-12 RX ADMIN — SENNA PLUS 2 TABLET(S): 8.6 TABLET ORAL at 22:41

## 2019-10-12 RX ADMIN — Medication 25 MILLIGRAM(S): at 06:13

## 2019-10-12 RX ADMIN — Medication 500000 UNIT(S): at 14:55

## 2019-10-12 RX ADMIN — Medication 500000 UNIT(S): at 22:45

## 2019-10-12 RX ADMIN — ATORVASTATIN CALCIUM 40 MILLIGRAM(S): 80 TABLET, FILM COATED ORAL at 22:41

## 2019-10-12 RX ADMIN — Medication 500000 UNIT(S): at 06:13

## 2019-10-13 RX ADMIN — Medication 500000 UNIT(S): at 13:02

## 2019-10-13 RX ADMIN — Medication 1 APPLICATION(S): at 06:01

## 2019-10-13 RX ADMIN — Medication 1 APPLICATION(S): at 17:16

## 2019-10-13 RX ADMIN — Medication 25 MILLIGRAM(S): at 06:03

## 2019-10-13 RX ADMIN — ATORVASTATIN CALCIUM 40 MILLIGRAM(S): 80 TABLET, FILM COATED ORAL at 22:09

## 2019-10-13 RX ADMIN — Medication 81 MILLIGRAM(S): at 13:02

## 2019-10-13 RX ADMIN — Medication 100 MILLIGRAM(S): at 13:02

## 2019-10-13 RX ADMIN — Medication 100 MILLIGRAM(S): at 06:03

## 2019-10-13 RX ADMIN — Medication 500000 UNIT(S): at 06:02

## 2019-10-13 RX ADMIN — Medication 100 MILLIGRAM(S): at 22:09

## 2019-10-13 RX ADMIN — ENOXAPARIN SODIUM 40 MILLIGRAM(S): 100 INJECTION SUBCUTANEOUS at 22:10

## 2019-10-13 RX ADMIN — Medication 500000 UNIT(S): at 22:10

## 2019-10-13 RX ADMIN — SENNA PLUS 2 TABLET(S): 8.6 TABLET ORAL at 22:09

## 2019-10-14 LAB
ALBUMIN SERPL ELPH-MCNC: 3.5 G/DL — SIGNIFICANT CHANGE UP (ref 3.5–5.2)
ALP SERPL-CCNC: 51 U/L — SIGNIFICANT CHANGE UP (ref 30–115)
ALT FLD-CCNC: 12 U/L — SIGNIFICANT CHANGE UP (ref 0–41)
ANION GAP SERPL CALC-SCNC: 12 MMOL/L — SIGNIFICANT CHANGE UP (ref 7–14)
AST SERPL-CCNC: 19 U/L — SIGNIFICANT CHANGE UP (ref 0–41)
BILIRUB SERPL-MCNC: 0.5 MG/DL — SIGNIFICANT CHANGE UP (ref 0.2–1.2)
BUN SERPL-MCNC: 19 MG/DL — SIGNIFICANT CHANGE UP (ref 10–20)
CALCIUM SERPL-MCNC: 9 MG/DL — SIGNIFICANT CHANGE UP (ref 8.5–10.1)
CHLORIDE SERPL-SCNC: 108 MMOL/L — SIGNIFICANT CHANGE UP (ref 98–110)
CO2 SERPL-SCNC: 25 MMOL/L — SIGNIFICANT CHANGE UP (ref 17–32)
CREAT SERPL-MCNC: 0.7 MG/DL — SIGNIFICANT CHANGE UP (ref 0.7–1.5)
GLUCOSE SERPL-MCNC: 86 MG/DL — SIGNIFICANT CHANGE UP (ref 70–99)
HCT VFR BLD CALC: 25.9 % — LOW (ref 37–47)
HGB BLD-MCNC: 8.1 G/DL — LOW (ref 12–16)
MCHC RBC-ENTMCNC: 27.2 PG — SIGNIFICANT CHANGE UP (ref 27–31)
MCHC RBC-ENTMCNC: 31.3 G/DL — LOW (ref 32–37)
MCV RBC AUTO: 86.9 FL — SIGNIFICANT CHANGE UP (ref 81–99)
NRBC # BLD: 0 /100 WBCS — SIGNIFICANT CHANGE UP (ref 0–0)
PLATELET # BLD AUTO: 248 K/UL — SIGNIFICANT CHANGE UP (ref 130–400)
POTASSIUM SERPL-MCNC: 4.7 MMOL/L — SIGNIFICANT CHANGE UP (ref 3.5–5)
POTASSIUM SERPL-SCNC: 4.7 MMOL/L — SIGNIFICANT CHANGE UP (ref 3.5–5)
PROT SERPL-MCNC: 5.7 G/DL — LOW (ref 6–8)
RBC # BLD: 2.98 M/UL — LOW (ref 4.2–5.4)
RBC # FLD: 21 % — HIGH (ref 11.5–14.5)
SODIUM SERPL-SCNC: 145 MMOL/L — SIGNIFICANT CHANGE UP (ref 135–146)
WBC # BLD: 6.4 K/UL — SIGNIFICANT CHANGE UP (ref 4.8–10.8)
WBC # FLD AUTO: 6.4 K/UL — SIGNIFICANT CHANGE UP (ref 4.8–10.8)

## 2019-10-14 RX ORDER — NYSTATIN CREAM 100000 [USP'U]/G
1 CREAM TOPICAL
Refills: 0 | Status: DISCONTINUED | OUTPATIENT
Start: 2019-10-14 | End: 2019-10-18

## 2019-10-14 RX ADMIN — Medication 500000 UNIT(S): at 06:34

## 2019-10-14 RX ADMIN — SENNA PLUS 2 TABLET(S): 8.6 TABLET ORAL at 22:40

## 2019-10-14 RX ADMIN — Medication 500000 UNIT(S): at 12:28

## 2019-10-14 RX ADMIN — Medication 1 APPLICATION(S): at 06:34

## 2019-10-14 RX ADMIN — Medication 100 MILLIGRAM(S): at 06:34

## 2019-10-14 RX ADMIN — NYSTATIN CREAM 1 APPLICATION(S): 100000 CREAM TOPICAL at 17:26

## 2019-10-14 RX ADMIN — Medication 100 MILLIGRAM(S): at 22:39

## 2019-10-14 RX ADMIN — Medication 81 MILLIGRAM(S): at 12:28

## 2019-10-14 RX ADMIN — ENOXAPARIN SODIUM 40 MILLIGRAM(S): 100 INJECTION SUBCUTANEOUS at 22:40

## 2019-10-14 RX ADMIN — Medication 100 MILLIGRAM(S): at 12:28

## 2019-10-14 RX ADMIN — ATORVASTATIN CALCIUM 40 MILLIGRAM(S): 80 TABLET, FILM COATED ORAL at 22:39

## 2019-10-14 RX ADMIN — Medication 1 APPLICATION(S): at 17:26

## 2019-10-14 RX ADMIN — Medication 25 MILLIGRAM(S): at 06:34

## 2019-10-14 RX ADMIN — Medication 500000 UNIT(S): at 22:40

## 2019-10-15 LAB
BLD GP AB SCN SERPL QL: SIGNIFICANT CHANGE UP
HCT VFR BLD CALC: 26.1 % — LOW (ref 37–47)
HGB BLD-MCNC: 8.1 G/DL — LOW (ref 12–16)
MCHC RBC-ENTMCNC: 26.7 PG — LOW (ref 27–31)
MCHC RBC-ENTMCNC: 31 G/DL — LOW (ref 32–37)
MCV RBC AUTO: 86.1 FL — SIGNIFICANT CHANGE UP (ref 81–99)
NRBC # BLD: 0 /100 WBCS — SIGNIFICANT CHANGE UP (ref 0–0)
PLATELET # BLD AUTO: 245 K/UL — SIGNIFICANT CHANGE UP (ref 130–400)
RBC # BLD: 3.03 M/UL — LOW (ref 4.2–5.4)
RBC # FLD: 20.7 % — HIGH (ref 11.5–14.5)
WBC # BLD: 6.07 K/UL — SIGNIFICANT CHANGE UP (ref 4.8–10.8)
WBC # FLD AUTO: 6.07 K/UL — SIGNIFICANT CHANGE UP (ref 4.8–10.8)

## 2019-10-15 RX ORDER — FOLIC ACID 0.8 MG
1 TABLET ORAL DAILY
Refills: 0 | Status: DISCONTINUED | OUTPATIENT
Start: 2019-10-15 | End: 2019-10-18

## 2019-10-15 RX ORDER — FERROUS SULFATE 325(65) MG
325 TABLET ORAL DAILY
Refills: 0 | Status: DISCONTINUED | OUTPATIENT
Start: 2019-10-15 | End: 2019-10-18

## 2019-10-15 RX ORDER — BACITRACIN ZINC 500 UNIT/G
1 OINTMENT IN PACKET (EA) TOPICAL
Refills: 0 | Status: DISCONTINUED | OUTPATIENT
Start: 2019-10-15 | End: 2019-10-18

## 2019-10-15 RX ADMIN — Medication 100 MILLIGRAM(S): at 06:27

## 2019-10-15 RX ADMIN — NYSTATIN CREAM 1 APPLICATION(S): 100000 CREAM TOPICAL at 06:28

## 2019-10-15 RX ADMIN — Medication 1 APPLICATION(S): at 06:27

## 2019-10-15 RX ADMIN — ENOXAPARIN SODIUM 40 MILLIGRAM(S): 100 INJECTION SUBCUTANEOUS at 22:07

## 2019-10-15 RX ADMIN — Medication 81 MILLIGRAM(S): at 11:56

## 2019-10-15 RX ADMIN — Medication 500000 UNIT(S): at 06:27

## 2019-10-15 RX ADMIN — Medication 25 MILLIGRAM(S): at 06:27

## 2019-10-15 RX ADMIN — Medication 500000 UNIT(S): at 11:57

## 2019-10-15 RX ADMIN — Medication 1 APPLICATION(S): at 17:55

## 2019-10-15 RX ADMIN — Medication 100 MILLIGRAM(S): at 11:57

## 2019-10-15 RX ADMIN — ATORVASTATIN CALCIUM 40 MILLIGRAM(S): 80 TABLET, FILM COATED ORAL at 22:06

## 2019-10-15 RX ADMIN — Medication 500000 UNIT(S): at 22:06

## 2019-10-16 RX ADMIN — NYSTATIN CREAM 1 APPLICATION(S): 100000 CREAM TOPICAL at 05:42

## 2019-10-16 RX ADMIN — Medication 25 MILLIGRAM(S): at 05:41

## 2019-10-16 RX ADMIN — Medication 100 MILLIGRAM(S): at 21:20

## 2019-10-16 RX ADMIN — Medication 1 APPLICATION(S): at 17:09

## 2019-10-16 RX ADMIN — Medication 100 MILLIGRAM(S): at 13:39

## 2019-10-16 RX ADMIN — Medication 500000 UNIT(S): at 21:20

## 2019-10-16 RX ADMIN — Medication 325 MILLIGRAM(S): at 12:35

## 2019-10-16 RX ADMIN — SENNA PLUS 2 TABLET(S): 8.6 TABLET ORAL at 21:20

## 2019-10-16 RX ADMIN — Medication 1 APPLICATION(S): at 05:41

## 2019-10-16 RX ADMIN — Medication 1 MILLIGRAM(S): at 12:35

## 2019-10-16 RX ADMIN — Medication 81 MILLIGRAM(S): at 12:35

## 2019-10-16 RX ADMIN — NYSTATIN CREAM 1 APPLICATION(S): 100000 CREAM TOPICAL at 17:09

## 2019-10-16 RX ADMIN — Medication 500000 UNIT(S): at 13:40

## 2019-10-16 RX ADMIN — Medication 500000 UNIT(S): at 05:42

## 2019-10-16 RX ADMIN — Medication 1 APPLICATION(S): at 05:43

## 2019-10-16 RX ADMIN — ENOXAPARIN SODIUM 40 MILLIGRAM(S): 100 INJECTION SUBCUTANEOUS at 21:20

## 2019-10-16 RX ADMIN — ATORVASTATIN CALCIUM 40 MILLIGRAM(S): 80 TABLET, FILM COATED ORAL at 21:20

## 2019-10-17 LAB
ANISOCYTOSIS BLD QL: SIGNIFICANT CHANGE UP
BASOPHILS # BLD AUTO: 0 K/UL — SIGNIFICANT CHANGE UP (ref 0–0.2)
BASOPHILS NFR BLD AUTO: 0 % — SIGNIFICANT CHANGE UP (ref 0–1)
ELLIPTOCYTES BLD QL SMEAR: SLIGHT — SIGNIFICANT CHANGE UP
EOSINOPHIL # BLD AUTO: 0 K/UL — SIGNIFICANT CHANGE UP (ref 0–0.7)
EOSINOPHIL NFR BLD AUTO: 0 % — SIGNIFICANT CHANGE UP (ref 0–8)
GIANT PLATELETS BLD QL SMEAR: PRESENT — SIGNIFICANT CHANGE UP
HCT VFR BLD CALC: 27.9 % — LOW (ref 37–47)
HGB BLD-MCNC: 8.8 G/DL — LOW (ref 12–16)
LYMPHOCYTES # BLD AUTO: 0.45 K/UL — LOW (ref 1.2–3.4)
LYMPHOCYTES # BLD AUTO: 6.1 % — LOW (ref 20.5–51.1)
MANUAL SMEAR VERIFICATION: SIGNIFICANT CHANGE UP
MCHC RBC-ENTMCNC: 26.8 PG — LOW (ref 27–31)
MCHC RBC-ENTMCNC: 31.5 G/DL — LOW (ref 32–37)
MCV RBC AUTO: 85.1 FL — SIGNIFICANT CHANGE UP (ref 81–99)
MICROCYTES BLD QL: SIGNIFICANT CHANGE UP
MONOCYTES # BLD AUTO: 0.19 K/UL — SIGNIFICANT CHANGE UP (ref 0.1–0.6)
MONOCYTES NFR BLD AUTO: 2.6 % — SIGNIFICANT CHANGE UP (ref 1.7–9.3)
MYELOCYTES NFR BLD: 0.9 % — HIGH (ref 0–0)
NEUTROPHILS # BLD AUTO: 6.54 K/UL — HIGH (ref 1.4–6.5)
NEUTROPHILS NFR BLD AUTO: 88.6 % — HIGH (ref 42.2–75.2)
NRBC # BLD: 2 /100 — HIGH (ref 0–0)
NRBC # BLD: SIGNIFICANT CHANGE UP /100 WBCS (ref 0–0)
PLAT MORPH BLD: ABNORMAL
PLATELET # BLD AUTO: 232 K/UL — SIGNIFICANT CHANGE UP (ref 130–400)
POIKILOCYTOSIS BLD QL AUTO: SIGNIFICANT CHANGE UP
POLYCHROMASIA BLD QL SMEAR: SIGNIFICANT CHANGE UP
RBC # BLD: 3.28 M/UL — LOW (ref 4.2–5.4)
RBC # FLD: 21.2 % — HIGH (ref 11.5–14.5)
RBC BLD AUTO: ABNORMAL
SCHISTOCYTES BLD QL AUTO: SLIGHT — SIGNIFICANT CHANGE UP
VARIANT LYMPHS # BLD: 1.8 % — SIGNIFICANT CHANGE UP (ref 0–5)
WBC # BLD: 7.38 K/UL — SIGNIFICANT CHANGE UP (ref 4.8–10.8)
WBC # FLD AUTO: 7.38 K/UL — SIGNIFICANT CHANGE UP (ref 4.8–10.8)

## 2019-10-17 RX ADMIN — NYSTATIN CREAM 1 APPLICATION(S): 100000 CREAM TOPICAL at 05:24

## 2019-10-17 RX ADMIN — Medication 1 MILLIGRAM(S): at 12:55

## 2019-10-17 RX ADMIN — Medication 500000 UNIT(S): at 05:16

## 2019-10-17 RX ADMIN — Medication 1 APPLICATION(S): at 05:16

## 2019-10-17 RX ADMIN — NYSTATIN CREAM 1 APPLICATION(S): 100000 CREAM TOPICAL at 17:21

## 2019-10-17 RX ADMIN — Medication 500000 UNIT(S): at 12:56

## 2019-10-17 RX ADMIN — Medication 81 MILLIGRAM(S): at 12:54

## 2019-10-17 RX ADMIN — ATORVASTATIN CALCIUM 40 MILLIGRAM(S): 80 TABLET, FILM COATED ORAL at 21:08

## 2019-10-17 RX ADMIN — Medication 100 MILLIGRAM(S): at 05:17

## 2019-10-17 RX ADMIN — Medication 500000 UNIT(S): at 21:08

## 2019-10-17 RX ADMIN — Medication 325 MILLIGRAM(S): at 12:55

## 2019-10-17 RX ADMIN — Medication 1 APPLICATION(S): at 17:22

## 2019-10-17 RX ADMIN — Medication 25 MILLIGRAM(S): at 05:15

## 2019-10-17 RX ADMIN — Medication 100 MILLIGRAM(S): at 12:55

## 2019-10-18 ENCOUNTER — TRANSCRIPTION ENCOUNTER (OUTPATIENT)
Age: 84
End: 2019-10-18

## 2019-10-18 RX ORDER — ATORVASTATIN CALCIUM 80 MG/1
1 TABLET, FILM COATED ORAL
Qty: 0 | Refills: 0 | DISCHARGE
Start: 2019-10-18

## 2019-10-18 RX ORDER — ASPIRIN/CALCIUM CARB/MAGNESIUM 324 MG
1 TABLET ORAL
Qty: 30 | Refills: 1
Start: 2019-10-18 | End: 2019-12-16

## 2019-10-18 RX ORDER — METOPROLOL TARTRATE 50 MG
1 TABLET ORAL
Qty: 0 | Refills: 0 | DISCHARGE
Start: 2019-10-18

## 2019-10-18 RX ORDER — ATORVASTATIN CALCIUM 80 MG/1
1 TABLET, FILM COATED ORAL
Qty: 30 | Refills: 1
Start: 2019-10-18 | End: 2019-12-16

## 2019-10-18 RX ORDER — INFLUENZA VIRUS VACCINE 15; 15; 15; 15 UG/.5ML; UG/.5ML; UG/.5ML; UG/.5ML
0.5 SUSPENSION INTRAMUSCULAR ONCE
Refills: 0 | Status: COMPLETED | OUTPATIENT
Start: 2019-10-18 | End: 2019-10-18

## 2019-10-18 RX ORDER — BACITRACIN ZINC 500 UNIT/G
1 OINTMENT IN PACKET (EA) TOPICAL
Qty: 0 | Refills: 0 | DISCHARGE
Start: 2019-10-18

## 2019-10-18 RX ORDER — FERROUS SULFATE 325(65) MG
1 TABLET ORAL
Qty: 30 | Refills: 0
Start: 2019-10-18 | End: 2019-11-16

## 2019-10-18 RX ORDER — FOLIC ACID 0.8 MG
1 TABLET ORAL
Qty: 0 | Refills: 0 | DISCHARGE
Start: 2019-10-18

## 2019-10-18 RX ORDER — FOLIC ACID 0.8 MG
1 TABLET ORAL
Qty: 30 | Refills: 0
Start: 2019-10-18 | End: 2019-11-16

## 2019-10-18 RX ORDER — ASPIRIN/CALCIUM CARB/MAGNESIUM 324 MG
1 TABLET ORAL
Qty: 0 | Refills: 0 | DISCHARGE
Start: 2019-10-18

## 2019-10-18 RX ORDER — METOPROLOL TARTRATE 50 MG
1 TABLET ORAL
Qty: 30 | Refills: 1
Start: 2019-10-18 | End: 2019-12-16

## 2019-10-18 RX ORDER — BACITRACIN ZINC 500 UNIT/G
1 OINTMENT IN PACKET (EA) TOPICAL
Qty: 30 | Refills: 0
Start: 2019-10-18 | End: 2019-10-24

## 2019-10-18 RX ADMIN — NYSTATIN CREAM 1 APPLICATION(S): 100000 CREAM TOPICAL at 06:10

## 2019-10-18 RX ADMIN — Medication 81 MILLIGRAM(S): at 10:59

## 2019-10-18 RX ADMIN — Medication 500000 UNIT(S): at 06:10

## 2019-10-18 RX ADMIN — Medication 1 APPLICATION(S): at 06:08

## 2019-10-18 RX ADMIN — Medication 100 MILLIGRAM(S): at 06:08

## 2019-10-18 RX ADMIN — Medication 100 MILLIGRAM(S): at 11:00

## 2019-10-18 RX ADMIN — Medication 25 MILLIGRAM(S): at 06:09

## 2019-10-18 RX ADMIN — Medication 1 MILLIGRAM(S): at 10:59

## 2019-10-18 RX ADMIN — INFLUENZA VIRUS VACCINE 0.5 MILLILITER(S): 15; 15; 15; 15 SUSPENSION INTRAMUSCULAR at 15:49

## 2019-10-18 RX ADMIN — Medication 325 MILLIGRAM(S): at 10:59

## 2019-10-18 RX ADMIN — Medication 500000 UNIT(S): at 11:00

## 2019-10-18 NOTE — DISCHARGE NOTE NURSING/CASE MANAGEMENT/SOCIAL WORK - PATIENT PORTAL LINK FT
You can access the FollowMyHealth Patient Portal offered by [facility] by registering at the following website: [email or web address]. By joining [employer]’s FollowMyHealth portal, you will also be able to view your health information using other applications (apps) compatible with our system.

## 2019-10-18 NOTE — DISCHARGE NOTE PROVIDER - NSFOLLOWUPCLINICS_GEN_ALL_ED_FT
[facility]  Neurology  [address] [place] [address], [facility] [address]  [place] [address]  Phone: [phone]  Fax:   Follow Up Time: 7-10 Days    [facility]  Medicine  [address] [place], [place]   Phone: [phone]  Fax:   Follow Up Time: 7-10 Days

## 2019-10-18 NOTE — DISCHARGE NOTE PROVIDER - NSDCFUADDINST_GEN_ALL_CORE_FT
Follow up with primary care provider within 1 week from discharge  Follow up with Dr. Tenorio in 2 weeks regarding outpouching of verebral artery  follow up with Neurology in 2 weeks

## 2019-10-18 NOTE — DISCHARGE NOTE NURSING/CASE MANAGEMENT/SOCIAL WORK - NSDCPEPTSTRK_GEN_ALL_CORE
Stroke support groups for patients, families, and friends/Call 911 for stroke/Prescribed medications/Risk factors for stroke/Stroke warning signs and symptoms/Signs and symptoms of stroke/Need for follow up after discharge/Stroke education booklet

## 2019-10-18 NOTE — DISCHARGE NOTE PROVIDER - NSDCCPCAREPLAN_GEN_ALL_CORE_FT
PRINCIPAL DISCHARGE DIAGNOSIS  Diagnosis: Cerebrovascular accident (CVA)  Assessment and Plan of Treatment: continue aspirin and statin  Follow up with Dr. Tenorio and neurology as an outpatient      SECONDARY DISCHARGE DIAGNOSES  Diagnosis: Dementia  Assessment and Plan of Treatment: follow up with PCP

## 2019-10-18 NOTE — DISCHARGE NOTE PROVIDER - HOSPITAL COURSE
HPI:         89 year old female with distant history of Colon Ca with liver mets s/p resection 15 years ago, dementia brought in due to left sided weakness. Patient lives at home alone, was called by daughter around 1 PM yesterday and was fine. Daughter came to visit her today, and found her in bed unable to move left side and having difficulty speaking. Daughter states that patient goes to bed around 10, so that's likely time last known well.     Currently patient denies any complaints.    Per daughter, at baseline patient ambulates at home, has mild dementia, with problems with short term memory.        She was not a candidate for TPA on admission as she was outside of the window. She was loaded with aspirin and started on lipitor 80mg. n subsequent MRI, she was found to have an acute/subacute infarct of the right juno. She had a small 2mm outpouching in distal right vertebral artery on CTA. Her TTE demonstrated a normal EF, grade 1 diastolic dysfunction, and mild pulmonary hypertension. Her neurological exam remained stable - her residual deficits are mild L sided weakness and dysarthria. She was also started on empiric vantin for a suspected UTI - cultures/sensitivities are still pending at time of discharge. She was also found to be anemic, which is stable. There is no signs of active bleeding or hemolysis. Iron studies were normal. She received 1 unit of pRBCs on the floor, and will require repeat CBC within 1 week. At this time, she is stable for and amenable to discharge to inpatient rehab.        Prior functional status: independent ambulation with straight cane and independent with ADLs         Physical Exam:        GENERAL: NAD, well-developed    HEAD:  Atraumatic, Normocephalic    EYES: EOMI, PERRLA, conjunctiva and sclera clear    NECK: Supple, No JVD,    CHEST/LUNG: Clear    HEART: Regular rate and rhythm;     ABDOMEN: Soft, Nontender, Nondistended; Bowel sounds present    EXTREMITIES: no cyanosis or edema    Motor: 5/5 on R side, 4+/5 in L side throughout    Neuro- poor memory and recall, not fully orientated             Hospital Course: Patient was admitted to rehab unit on 9/25/19. Patient was medically stable throughout rehab admission and patient participated in PT/OT/SLP daily. Patient was started on ferrous sulfate for anemia. Burn was consulted for R elbow lesion which bacitracin was recommended.             Discharge functional status: ambulates with minimum assistance with RW, CGA for ADLs             Discharge disposition: home with homecare

## 2019-10-22 DIAGNOSIS — I10 ESSENTIAL (PRIMARY) HYPERTENSION: ICD-10-CM

## 2019-10-22 DIAGNOSIS — I69.391 DYSPHAGIA FOLLOWING CEREBRAL INFARCTION: ICD-10-CM

## 2019-10-22 DIAGNOSIS — Z85.038 PERSONAL HISTORY OF OTHER MALIGNANT NEOPLASM OF LARGE INTESTINE: ICD-10-CM

## 2019-10-22 DIAGNOSIS — F41.9 ANXIETY DISORDER, UNSPECIFIED: ICD-10-CM

## 2019-10-22 DIAGNOSIS — Z90.49 ACQUIRED ABSENCE OF OTHER SPECIFIED PARTS OF DIGESTIVE TRACT: ICD-10-CM

## 2019-10-22 DIAGNOSIS — L21.9 SEBORRHEIC DERMATITIS, UNSPECIFIED: ICD-10-CM

## 2019-10-22 DIAGNOSIS — I69.354 HEMIPLEGIA AND HEMIPARESIS FOLLOWING CEREBRAL INFARCTION AFFECTING LEFT NON-DOMINANT SIDE: ICD-10-CM

## 2019-10-22 DIAGNOSIS — F03.90 UNSPECIFIED DEMENTIA WITHOUT BEHAVIORAL DISTURBANCE: ICD-10-CM

## 2019-10-22 DIAGNOSIS — N39.0 URINARY TRACT INFECTION, SITE NOT SPECIFIED: ICD-10-CM

## 2019-10-22 DIAGNOSIS — R06.02 SHORTNESS OF BREATH: ICD-10-CM

## 2019-10-22 DIAGNOSIS — E78.5 HYPERLIPIDEMIA, UNSPECIFIED: ICD-10-CM

## 2019-10-22 DIAGNOSIS — I27.20 PULMONARY HYPERTENSION, UNSPECIFIED: ICD-10-CM

## 2019-10-22 DIAGNOSIS — I65.01 OCCLUSION AND STENOSIS OF RIGHT VERTEBRAL ARTERY: ICD-10-CM

## 2019-10-22 DIAGNOSIS — D64.9 ANEMIA, UNSPECIFIED: ICD-10-CM

## 2019-10-22 DIAGNOSIS — K59.00 CONSTIPATION, UNSPECIFIED: ICD-10-CM

## 2019-10-22 DIAGNOSIS — Z85.05 PERSONAL HISTORY OF MALIGNANT NEOPLASM OF LIVER: ICD-10-CM

## 2019-10-22 DIAGNOSIS — E53.8 DEFICIENCY OF OTHER SPECIFIED B GROUP VITAMINS: ICD-10-CM

## 2019-10-22 DIAGNOSIS — R13.10 DYSPHAGIA, UNSPECIFIED: ICD-10-CM

## 2019-10-23 ENCOUNTER — APPOINTMENT (OUTPATIENT)
Dept: GERIATRICS | Facility: HOME HEALTH | Age: 84
End: 2019-10-23
Payer: MEDICARE

## 2019-10-23 DIAGNOSIS — I69.322 DYSARTHRIA FOLLOWING CEREBRAL INFARCTION: ICD-10-CM

## 2019-10-23 DIAGNOSIS — F02.80 ALZHEIMER'S DISEASE, UNSPECIFIED: ICD-10-CM

## 2019-10-23 DIAGNOSIS — E53.8 DEFICIENCY OF OTHER SPECIFIED B GROUP VITAMINS: ICD-10-CM

## 2019-10-23 DIAGNOSIS — G30.9 ALZHEIMER'S DISEASE, UNSPECIFIED: ICD-10-CM

## 2019-10-23 DIAGNOSIS — I63.9 CEREBRAL INFARCTION, UNSPECIFIED: ICD-10-CM

## 2019-10-23 DIAGNOSIS — L89.214 PRESSURE ULCER OF RIGHT HIP, STAGE 4: ICD-10-CM

## 2019-10-23 DIAGNOSIS — R53.1 WEAKNESS: ICD-10-CM

## 2019-10-23 DIAGNOSIS — Z63.4 DISAPPEARANCE AND DEATH OF FAMILY MEMBER: ICD-10-CM

## 2019-10-23 DIAGNOSIS — L30.9 DERMATITIS, UNSPECIFIED: ICD-10-CM

## 2019-10-23 DIAGNOSIS — Z85.038 PERSONAL HISTORY OF OTHER MALIGNANT NEOPLASM OF LARGE INTESTINE: ICD-10-CM

## 2019-10-23 DIAGNOSIS — I50.32 CHRONIC DIASTOLIC (CONGESTIVE) HEART FAILURE: ICD-10-CM

## 2019-10-23 DIAGNOSIS — Z78.9 OTHER SPECIFIED HEALTH STATUS: ICD-10-CM

## 2019-10-23 DIAGNOSIS — D64.9 ANEMIA, UNSPECIFIED: ICD-10-CM

## 2019-10-23 PROCEDURE — 99348 HOME/RES VST EST LOW MDM 30: CPT

## 2019-10-23 SDOH — SOCIAL STABILITY - SOCIAL INSECURITY: DISSAPEARANCE AND DEATH OF FAMILY MEMBER: Z63.4

## 2019-10-25 VITALS
TEMPERATURE: 97.6 F | HEART RATE: 70 BPM | DIASTOLIC BLOOD PRESSURE: 68 MMHG | RESPIRATION RATE: 18 BRPM | SYSTOLIC BLOOD PRESSURE: 118 MMHG | OXYGEN SATURATION: 93 %

## 2019-10-25 PROBLEM — Z78.9 DOES NOT USE ILLICIT DRUGS: Status: ACTIVE | Noted: 2019-10-25

## 2019-10-25 PROBLEM — D64.9 ANEMIA: Status: ACTIVE | Noted: 2019-10-25

## 2019-10-25 PROBLEM — L30.9 DERMATITIS: Status: ACTIVE | Noted: 2019-10-25

## 2019-10-25 PROBLEM — I50.32 CHRONIC DIASTOLIC HEART FAILURE: Status: ACTIVE | Noted: 2019-10-25

## 2019-10-25 PROBLEM — E53.8 FOLIC ACID DEFICIENCY: Status: ACTIVE | Noted: 2019-10-25

## 2019-10-25 PROBLEM — I69.322 DYSARTHRIA, POST-STROKE: Status: ACTIVE | Noted: 2019-10-25

## 2019-10-25 PROBLEM — Z85.038 HISTORY OF MALIGNANT NEOPLASM OF COLON: Status: RESOLVED | Noted: 2019-10-25 | Resolved: 2019-10-25

## 2019-10-25 PROBLEM — I63.9 CVA (CEREBRAL VASCULAR ACCIDENT): Status: ACTIVE | Noted: 2019-10-25

## 2019-10-25 PROBLEM — R53.1 LEFT-SIDED WEAKNESS: Status: ACTIVE | Noted: 2019-10-25

## 2019-10-25 PROBLEM — L89.214 DECUBITUS ULCER OF RIGHT HIP, STAGE 4: Status: RESOLVED | Noted: 2017-03-09 | Resolved: 2019-10-25

## 2019-10-25 PROBLEM — Z63.4 WIDOWED: Status: ACTIVE | Noted: 2019-10-25

## 2019-10-25 RX ORDER — TRIAMCINOLONE ACETONIDE 1 MG/G
0.1 CREAM TOPICAL TWICE DAILY
Qty: 16 | Refills: 2 | Status: ACTIVE | COMMUNITY
Start: 2019-10-25 | End: 1900-01-01

## 2019-10-25 RX ORDER — ATORVASTATIN CALCIUM 80 MG/1
80 TABLET, FILM COATED ORAL
Refills: 0 | Status: ACTIVE | COMMUNITY

## 2019-10-25 RX ORDER — FOLIC ACID 1 MG/1
1 TABLET ORAL
Refills: 0 | Status: ACTIVE | COMMUNITY

## 2019-10-25 RX ORDER — ASPIRIN 81 MG/1
81 TABLET, CHEWABLE ORAL
Refills: 0 | Status: ACTIVE | COMMUNITY

## 2019-10-25 RX ORDER — FERROUS SULFATE 325(65) MG
325 TABLET ORAL
Refills: 0 | Status: ACTIVE | COMMUNITY

## 2019-10-25 RX ORDER — METOPROLOL SUCCINATE 25 MG/1
25 TABLET, EXTENDED RELEASE ORAL
Refills: 0 | Status: ACTIVE | COMMUNITY

## 2019-10-25 NOTE — HISTORY OF PRESENT ILLNESS
[FreeTextEntry1] : Patient seen and examined at home.  Patient is homebound.  HHA present during visit, spoke w/Dtr via telephone.  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  Treated for UTI and received 1u PRBC's.  Medications reviewed and reconciled with Dtr.  Doing well since discharge.  No complaints of CP/SOB.  No abdominal complaints with regular BM's and good appetite.  No urinary complaints.  Hx of mild AD but A/O x 3 at visit and answers questions. \par \par Patient currently receiving PT/OT and SLP, not on modified diet at this time

## 2019-10-25 NOTE — ASSESSMENT
[FreeTextEntry1] : s/p CVA with residual left sided weakness (mild) and dysarthria (mild)\par - c/w ASA and Statin at current doses\par - mild left sided weakness and dysarthria at baseline\par - following with SLP and not on modified diet, was on thickened liquids but changed back per Dtr\par - c/w PT/OT\par - DME for ambulation\par - outpatient Neurology follow up (Dr. Taye Jones)\par \par Chronic DHF\par - at baseline without exacerbation\par - c/w Metoprolol 25mg QD\par \par Anemia\par - workup from inpatient stay reviewed, ACD?\par - c/w FeSO4 as FE borderline low on workup\par - CBC stable, will check periodically\par \par Dementia\par - at baseline currently but Dtr wants to start medication.  Follows up with Neurology in several weeks and reported reluctant to start while patient recovering from CVA.  Will maintain off medications for now and will discuss with Neuro at follow up\par \par Dermatitis\par - spoke with patient's Dtr post vist and new complaint of rash posterior calf x 2 days with mild pruritus.  Will Rx Triamcinolone 0.1% apply BID.  Advised to call w/changes in status.

## 2019-10-25 NOTE — PHYSICAL EXAM
[General Appearance - Alert] : alert [General Appearance - In No Acute Distress] : in no acute distress [General Appearance - Well Nourished] : well nourished [General Appearance - Well Developed] : well developed [Sclera] : the sclera and conjunctiva were normal [Normal Oral Mucosa] : normal oral mucosa [No Oral Pallor] : no oral pallor [No Oral Cyanosis] : no oral cyanosis [Outer Ear] : the ears and nose were normal in appearance [Hearing Threshold Finger Rub Not Clark] : hearing was normal [Examination Of The Oral Cavity] : the lips and gums were normal [Neck Cervical Mass (___cm)] : no neck mass was observed [Jugular Venous Distention Increased] : there was no jugular-venous distention [Respiration, Rhythm And Depth] : normal respiratory rhythm and effort [Exaggerated Use Of Accessory Muscles For Inspiration] : no accessory muscle use [Auscultation Breath Sounds / Voice Sounds] : lungs were clear to auscultation bilaterally [Heart Rate And Rhythm] : heart rate was normal and rhythm regular [Heart Sounds] : normal S1 and S2 [Abdomen Tenderness] : non-tender [Abdomen Soft] : soft [Nail Clubbing] : no clubbing  or cyanosis of the fingernails [Involuntary Movements] : no involuntary movements were seen [] : no rash [Skin Lesions] : no skin lesions [Cranial Nerves] : cranial nerves 2-12 were intact [FreeTextEntry1] : + left sided weakness [Oriented To Time, Place, And Person] : oriented to person, place, and time [Impaired Insight] : insight and judgment were intact

## 2019-10-28 ENCOUNTER — APPOINTMENT (OUTPATIENT)
Dept: GERIATRICS | Facility: HOME HEALTH | Age: 84
End: 2019-10-28

## 2019-10-28 ENCOUNTER — APPOINTMENT (OUTPATIENT)
Dept: GERIATRICS | Facility: HOME HEALTH | Age: 84
End: 2019-10-28
Payer: MEDICARE

## 2019-10-28 PROCEDURE — 99348 HOME/RES VST EST LOW MDM 30: CPT

## 2019-11-18 NOTE — HISTORY OF PRESENT ILLNESS
[FreeTextEntry1] : Pt is homebound due to OA . Dtr called for pt having developed itchy rash scattered over the body > VNS suspects it to be scabies . family is very concerned and wants pt to be evaluated and Rx . No h/o exposure to ill contact . or insect bite. Denies any h/o bedbug or allergies. There is no h/o F/chills / resp or abdominal problems [0] : 1) Little interest or pleasure doing things: Not at all [1] : 2) Feeling down, depressed, or hopeless for several days

## 2019-11-18 NOTE — REVIEW OF SYSTEMS
[Feeling Poorly] : feeling poorly [Incontinence] : incontinence [Arthralgias] : arthralgias [Joint Stiffness] : joint stiffness [Skin Lesions] : skin lesion [Itching] : itching [Limb Weakness] : limb weakness [Difficulty Walking] : difficulty walking [Change In Personality] : personality change [Negative] : Gastrointestinal

## 2019-11-18 NOTE — PHYSICAL EXAM
[Sclera] : the sclera and conjunctiva were normal [PERRL With Normal Accommodation] : pupils were equal in size, round, and reactive to light [Extraocular Movements] : extraocular movements were intact [Outer Ear] : the ears and nose were normal in appearance [Oropharynx] : the oropharynx was normal [Auscultation Breath Sounds / Voice Sounds] : lungs were clear to auscultation bilaterally [Heart Rate And Rhythm] : heart rate was normal and rhythm regular [Heart Sounds] : normal S1 and S2 [Heart Sounds Gallop] : no gallops [Murmurs] : no murmurs [Heart Sounds Pericardial Friction Rub] : no pericardial rub [Edema] : there was no peripheral edema [Bowel Sounds] : normal bowel sounds [Abdomen Soft] : soft [Abdomen Tenderness] : non-tender [] : no hepato-splenomegaly [Abdomen Mass (___ Cm)] : no abdominal mass palpated [Ataxic] : ataxic [Number of Patches: ___] : [unfilled] [Red] : red [Irregular] : in an irregular pattern [Linear] : in a linear pattern [Morbilliform] : in a morbilliform pattern [From ___ cm] : from [unfilled] ~Ucm [To ___ cm] : to [unfilled] ~m [Soft] : soft [Warm] : not warm [Tender] : non-tender [FreeTextEntry1] : erythmatous , pinkish , linear to irregular squamopapular patches ,w pruritis scattered jane over the forearms and lower legs b/l. no warmth ortenderness nated. no weeping leions present, excoriation marke seen jane over the lateral legs b/l

## 2019-12-12 ENCOUNTER — INPATIENT (INPATIENT)
Facility: HOSPITAL | Age: 84
LOS: 5 days | Discharge: HOME HEALTH | End: 2019-12-18
Attending: INTERNAL MEDICINE | Admitting: INTERNAL MEDICINE
Payer: MEDICARE

## 2019-12-12 VITALS
SYSTOLIC BLOOD PRESSURE: 130 MMHG | TEMPERATURE: 96 F | HEART RATE: 88 BPM | DIASTOLIC BLOOD PRESSURE: 95 MMHG | OXYGEN SATURATION: 95 % | RESPIRATION RATE: 20 BRPM

## 2019-12-12 DIAGNOSIS — Z90.49 ACQUIRED ABSENCE OF OTHER SPECIFIED PARTS OF DIGESTIVE TRACT: Chronic | ICD-10-CM

## 2019-12-12 LAB
ALBUMIN SERPL ELPH-MCNC: 3.7 G/DL — SIGNIFICANT CHANGE UP (ref 3.5–5.2)
ALP SERPL-CCNC: 53 U/L — SIGNIFICANT CHANGE UP (ref 30–115)
ALT FLD-CCNC: 6 U/L — SIGNIFICANT CHANGE UP (ref 0–41)
ANION GAP SERPL CALC-SCNC: 13 MMOL/L — SIGNIFICANT CHANGE UP (ref 7–14)
APPEARANCE UR: CLEAR — SIGNIFICANT CHANGE UP
APTT BLD: 24.3 SEC — LOW (ref 27–39.2)
AST SERPL-CCNC: 16 U/L — SIGNIFICANT CHANGE UP (ref 0–41)
BASOPHILS # BLD AUTO: 0.01 K/UL — SIGNIFICANT CHANGE UP (ref 0–0.2)
BASOPHILS NFR BLD AUTO: 0.1 % — SIGNIFICANT CHANGE UP (ref 0–1)
BILIRUB SERPL-MCNC: 0.8 MG/DL — SIGNIFICANT CHANGE UP (ref 0.2–1.2)
BILIRUB UR-MCNC: NEGATIVE — SIGNIFICANT CHANGE UP
BLD GP AB SCN SERPL QL: SIGNIFICANT CHANGE UP
BUN SERPL-MCNC: 15 MG/DL — SIGNIFICANT CHANGE UP (ref 10–20)
CALCIUM SERPL-MCNC: 8.6 MG/DL — SIGNIFICANT CHANGE UP (ref 8.5–10.1)
CHLORIDE SERPL-SCNC: 100 MMOL/L — SIGNIFICANT CHANGE UP (ref 98–110)
CK MB CFR SERPL CALC: <1 NG/ML — SIGNIFICANT CHANGE UP (ref 0.6–6.3)
CK SERPL-CCNC: 18 U/L — SIGNIFICANT CHANGE UP (ref 0–225)
CO2 SERPL-SCNC: 24 MMOL/L — SIGNIFICANT CHANGE UP (ref 17–32)
COLOR SPEC: SIGNIFICANT CHANGE UP
CREAT SERPL-MCNC: 0.8 MG/DL — SIGNIFICANT CHANGE UP (ref 0.7–1.5)
DIFF PNL FLD: NEGATIVE — SIGNIFICANT CHANGE UP
EOSINOPHIL # BLD AUTO: 0 K/UL — SIGNIFICANT CHANGE UP (ref 0–0.7)
EOSINOPHIL NFR BLD AUTO: 0 % — SIGNIFICANT CHANGE UP (ref 0–8)
GLUCOSE SERPL-MCNC: 129 MG/DL — HIGH (ref 70–99)
GLUCOSE UR QL: NEGATIVE — SIGNIFICANT CHANGE UP
HCT VFR BLD CALC: 24.7 % — LOW (ref 37–47)
HGB BLD-MCNC: 7.5 G/DL — LOW (ref 12–16)
IMM GRANULOCYTES NFR BLD AUTO: 5.5 % — HIGH (ref 0.1–0.3)
INR BLD: 1.27 RATIO — SIGNIFICANT CHANGE UP (ref 0.65–1.3)
KETONES UR-MCNC: NEGATIVE — SIGNIFICANT CHANGE UP
LEUKOCYTE ESTERASE UR-ACNC: NEGATIVE — SIGNIFICANT CHANGE UP
LYMPHOCYTES # BLD AUTO: 0.24 K/UL — LOW (ref 1.2–3.4)
LYMPHOCYTES # BLD AUTO: 2 % — LOW (ref 20.5–51.1)
MCHC RBC-ENTMCNC: 25.5 PG — LOW (ref 27–31)
MCHC RBC-ENTMCNC: 30.4 G/DL — LOW (ref 32–37)
MCV RBC AUTO: 84 FL — SIGNIFICANT CHANGE UP (ref 81–99)
MONOCYTES # BLD AUTO: 0.46 K/UL — SIGNIFICANT CHANGE UP (ref 0.1–0.6)
MONOCYTES NFR BLD AUTO: 3.9 % — SIGNIFICANT CHANGE UP (ref 1.7–9.3)
NEUTROPHILS # BLD AUTO: 10.5 K/UL — HIGH (ref 1.4–6.5)
NEUTROPHILS NFR BLD AUTO: 88.5 % — HIGH (ref 42.2–75.2)
NITRITE UR-MCNC: NEGATIVE — SIGNIFICANT CHANGE UP
NRBC # BLD: 1 /100 WBCS — HIGH (ref 0–0)
NT-PROBNP SERPL-SCNC: 879 PG/ML — HIGH (ref 0–300)
PH UR: 6.5 — SIGNIFICANT CHANGE UP (ref 5–8)
PLATELET # BLD AUTO: 200 K/UL — SIGNIFICANT CHANGE UP (ref 130–400)
POTASSIUM SERPL-MCNC: 4.4 MMOL/L — SIGNIFICANT CHANGE UP (ref 3.5–5)
POTASSIUM SERPL-SCNC: 4.4 MMOL/L — SIGNIFICANT CHANGE UP (ref 3.5–5)
PROT SERPL-MCNC: 6.4 G/DL — SIGNIFICANT CHANGE UP (ref 6–8)
PROT UR-MCNC: NEGATIVE — SIGNIFICANT CHANGE UP
PROTHROM AB SERPL-ACNC: 14.6 SEC — HIGH (ref 9.95–12.87)
RBC # BLD: 2.91 M/UL — LOW (ref 4.2–5.4)
RBC # BLD: 2.94 M/UL — LOW (ref 4.2–5.4)
RBC # FLD: 22.2 % — HIGH (ref 11.5–14.5)
RETICS #: 46.3 K/UL — SIGNIFICANT CHANGE UP (ref 25–125)
RETICS/RBC NFR: 1.6 % — HIGH (ref 0.5–1.5)
SODIUM SERPL-SCNC: 137 MMOL/L — SIGNIFICANT CHANGE UP (ref 135–146)
SP GR SPEC: 1.01 — SIGNIFICANT CHANGE UP (ref 1.01–1.02)
TROPONIN T SERPL-MCNC: <0.01 NG/ML — SIGNIFICANT CHANGE UP
UROBILINOGEN FLD QL: SIGNIFICANT CHANGE UP
WBC # BLD: 11.86 K/UL — HIGH (ref 4.8–10.8)
WBC # FLD AUTO: 11.86 K/UL — HIGH (ref 4.8–10.8)

## 2019-12-12 PROCEDURE — 72131 CT LUMBAR SPINE W/O DYE: CPT | Mod: 26

## 2019-12-12 PROCEDURE — 93010 ELECTROCARDIOGRAM REPORT: CPT

## 2019-12-12 PROCEDURE — 99285 EMERGENCY DEPT VISIT HI MDM: CPT | Mod: GC

## 2019-12-12 PROCEDURE — 70450 CT HEAD/BRAIN W/O DYE: CPT | Mod: 26

## 2019-12-12 PROCEDURE — 71045 X-RAY EXAM CHEST 1 VIEW: CPT | Mod: 26

## 2019-12-12 RX ORDER — ACETAMINOPHEN 500 MG
650 TABLET ORAL ONCE
Refills: 0 | Status: COMPLETED | OUTPATIENT
Start: 2019-12-12 | End: 2019-12-12

## 2019-12-12 RX ORDER — CEFTRIAXONE 500 MG/1
1000 INJECTION, POWDER, FOR SOLUTION INTRAMUSCULAR; INTRAVENOUS ONCE
Refills: 0 | Status: COMPLETED | OUTPATIENT
Start: 2019-12-12 | End: 2019-12-12

## 2019-12-12 RX ORDER — FUROSEMIDE 40 MG
40 TABLET ORAL ONCE
Refills: 0 | Status: COMPLETED | OUTPATIENT
Start: 2019-12-12 | End: 2019-12-12

## 2019-12-12 RX ORDER — PANTOPRAZOLE SODIUM 20 MG/1
40 TABLET, DELAYED RELEASE ORAL ONCE
Refills: 0 | Status: COMPLETED | OUTPATIENT
Start: 2019-12-12 | End: 2019-12-12

## 2019-12-12 RX ADMIN — Medication 650 MILLIGRAM(S): at 21:25

## 2019-12-12 RX ADMIN — Medication 40 MILLIGRAM(S): at 21:24

## 2019-12-12 NOTE — ED PROVIDER NOTE - PHYSICAL EXAMINATION
CONSTITUTIONAL: well-appearing, in NAD  SKIN: Warm dry, normal skin turgor  HEAD: NCAT  EYES: EOMI, PERRLA, no scleral icterus, conjunctiva pink  ENT: normal pharynx with no erythema or exudates  NECK: Supple; non tender. Full ROM.  CARD: RRR, no murmurs.  RESP: clear to ausculation b/l. No crackles or wheezing.  ABD: soft, non-tender, non-distended, no rebound or guarding.  EXT: Full ROM in UE, limited ROM in LE, no bony tenderness, + pedal edema, no calf tenderness  NEURO: normal motor and sensory in UE, normal sensory in LE, decreased motor b/l in LE. CN II-XII intact. Cerebellar testing normal.  PSYCH: Cooperative, appropriate.

## 2019-12-12 NOTE — ED PROVIDER NOTE - OBJECTIVE STATEMENT
89 y.o F w/ pmhx colon CA w/ liver s/p resection, dementia, anemia, HLD p/w new onset noticed at 5pm tonight in b/l LE. Pt had increased weakness noticed by health aid at 2pm. Pt ambulates with assistance but hasn't been able to ambulate today. No SOB, no cp, no abd pain, no dysuria, no fevers, no HA, no back pain. 89 y.o F w/ pmhx colon CA w/ liver s/p resection, dementia, anemia, HLD p/w new onset noticed at 5pm tonight in b/l LE. Pt had increased weakness noticed by health aid at 2pm. Pt ambulates with assistance but hasn't been able to ambulate today. No SOB, no cp, no abd pain, no dysuria, no fevers, no HA, no back pain. Patient reports patients legs are more swollen than normal and she has had previous symptoms like this before when she had an infection (i.e. UTI) and another time when she was anemic.

## 2019-12-12 NOTE — ED ADULT NURSE NOTE - NSIMPLEMENTINTERV_GEN_ALL_ED
Implemented All Fall with Harm Risk Interventions:  [place] to call system. Call bell, personal items and telephone within reach. Instruct patient to call for assistance. Room bathroom lighting operational. Non-slip footwear when patient is off stretcher. Physically safe environment: no spills, clutter or unnecessary equipment. Stretcher in lowest position, wheels locked, appropriate side rails in place. Provide visual cue, wrist band, yellow gown, etc. Monitor gait and stability. Monitor for mental status changes and reorient to person, place, and time. Review medications for side effects contributing to fall risk. Reinforce activity limits and safety measures with patient and family. Provide visual clues: red socks.

## 2019-12-12 NOTE — ED PROVIDER NOTE - PROGRESS NOTE DETAILS
BI: rectal exam shows black stool. Pt on iron pills. No BRBPR. Rectal temp 101.3. Pt not septic as no tachycardia, no tachypnea, WBC < 12. Will continue with transfusion. Pt b/l LE edema and BNP >800. Likely in fluid overload state. BI: rectal exam shows black stool. Pt on iron pills. No BRBPR. Rectal temp 101.3. Pt not septic as no tachycardia, no tachypnea, WBC < 12. Will continue with transfusion. Pt b/l LE edema and BNP >800. Likely in fluid overload state. Chaperoned by RAMAN Arreola

## 2019-12-12 NOTE — ED ADULT TRIAGE NOTE - CHIEF COMPLAINT QUOTE
B/l LE weakness today, has hx of stroke. Pt had episode of b/l le weakness around 1730 tonight witnessed by daughter. Pt assessed by Deirdre BAHENA

## 2019-12-12 NOTE — ED PROVIDER NOTE - CARE PLAN
Principal Discharge DX:	Anemia  Secondary Diagnosis:	Fever  Secondary Diagnosis:	Lower extremity weakness

## 2019-12-12 NOTE — ED PROVIDER NOTE - CLINICAL SUMMARY MEDICAL DECISION MAKING FREE TEXT BOX
On exam, VS reviewed.  Neuro exam is non focal and clinically this does not appear to be a stroke.  Patient has a non-tender spine and equal strength to b/l legs (legs are heavy and she can lift each of the bed for about 5 seconds). Clinically patient does not have cauda equina or spinal cord compression and she has no saddle anesthesia and normal rectal tone. Patient is not a t-PA candidate given her NIH score. Large bore IV placed, labs sent, CT head done.  Patient has no acute findings on Ct scan. Labs show anemia at 7.5.  Patient likely symptomatic and had a similar Hb in the past and improved with transfusion.  Patient consented and will transfuse.  + dark stool.  Will give PPI.  Patient developed a fever in the ED and was given Ceftriaxone. Will admit for rehab evaluation, transfusion, further fever montioring, continued treatment and resuscitation, Hematology evaluation.

## 2019-12-12 NOTE — ED PROVIDER NOTE - ATTENDING CONTRIBUTION TO CARE
I personally evaluated patient. I agree with the findings and plan with all documentation on chart except as documented  in my note.    89 y/o F PMHx Colon CA w/ liver s/p resection, dementia, anemia, HLD BIBEMS for weakness that worsened today.  Patient ambulated with a walker at baseline. Daughter has noticed her legs are very swollen and heavy and she was having difficulty getting around. No falls or recent trauma.  Patient eating and drinking normally.  Daughter denies any recent fever or illness.  Patient wears diapers.  She denies any headache, CP, SOB,. or back pain.    Daughter reports patients legs are more swollen than normal and she has had previous symptoms like this when she was anemic and another time when she had a UTI.    On exam, VS reviewed.  Neuro exam is non focal and clinically this does not appear to be a stroke.  Patient has a non-tender spine and equal strength to b/l legs (legs are heavy and she can lift each of the bed for about 5 seconds). Clinically patient does not have cauda equina or spinal cord compression and she has no saddle anesthesia and normal rectal tone. Patient is not a t-PA candidate given her NIH score. Large bore IV placed, labs sent, CT head done.  Patient has no acute findings on Ct scan. Labs show anemia at 7.5.  Patient likely symptomatic and had a similar Hb in the past and improved with transfusion.  Patient consented and will transfuse.  + dark stool.  Will give PPI.  Patient developed a fever in the ED and was given Ceftriaxone. Will admit for rehab evaluation, transfusion, further fever montioring, continued treatment and resuscitation, Hematology evaluation.

## 2019-12-13 LAB
ANION GAP SERPL CALC-SCNC: 16 MMOL/L — HIGH (ref 7–14)
BASOPHILS # BLD AUTO: 0.01 K/UL — SIGNIFICANT CHANGE UP (ref 0–0.2)
BASOPHILS NFR BLD AUTO: 0.1 % — SIGNIFICANT CHANGE UP (ref 0–1)
BUN SERPL-MCNC: 17 MG/DL — SIGNIFICANT CHANGE UP (ref 10–20)
CALCIUM SERPL-MCNC: 8.8 MG/DL — SIGNIFICANT CHANGE UP (ref 8.5–10.1)
CHLORIDE SERPL-SCNC: 103 MMOL/L — SIGNIFICANT CHANGE UP (ref 98–110)
CO2 SERPL-SCNC: 24 MMOL/L — SIGNIFICANT CHANGE UP (ref 17–32)
CREAT SERPL-MCNC: 0.8 MG/DL — SIGNIFICANT CHANGE UP (ref 0.7–1.5)
EOSINOPHIL # BLD AUTO: 0 K/UL — SIGNIFICANT CHANGE UP (ref 0–0.7)
EOSINOPHIL NFR BLD AUTO: 0 % — SIGNIFICANT CHANGE UP (ref 0–8)
GLUCOSE BLDC GLUCOMTR-MCNC: 121 MG/DL — HIGH (ref 70–99)
GLUCOSE SERPL-MCNC: 114 MG/DL — HIGH (ref 70–99)
HCT VFR BLD CALC: 26.2 % — LOW (ref 37–47)
HGB BLD-MCNC: 8.1 G/DL — LOW (ref 12–16)
IMM GRANULOCYTES NFR BLD AUTO: 5.2 % — HIGH (ref 0.1–0.3)
LDH SERPL L TO P-CCNC: 333 — HIGH (ref 50–242)
LYMPHOCYTES # BLD AUTO: 0.31 K/UL — LOW (ref 1.2–3.4)
LYMPHOCYTES # BLD AUTO: 3.3 % — LOW (ref 20.5–51.1)
MCHC RBC-ENTMCNC: 25.6 PG — LOW (ref 27–31)
MCHC RBC-ENTMCNC: 30.9 G/DL — LOW (ref 32–37)
MCV RBC AUTO: 82.9 FL — SIGNIFICANT CHANGE UP (ref 81–99)
MONOCYTES # BLD AUTO: 0.32 K/UL — SIGNIFICANT CHANGE UP (ref 0.1–0.6)
MONOCYTES NFR BLD AUTO: 3.4 % — SIGNIFICANT CHANGE UP (ref 1.7–9.3)
NEUTROPHILS # BLD AUTO: 8.25 K/UL — HIGH (ref 1.4–6.5)
NEUTROPHILS NFR BLD AUTO: 88 % — HIGH (ref 42.2–75.2)
NRBC # BLD: 0 /100 WBCS — SIGNIFICANT CHANGE UP (ref 0–0)
PLATELET # BLD AUTO: 177 K/UL — SIGNIFICANT CHANGE UP (ref 130–400)
POTASSIUM SERPL-MCNC: 4.2 MMOL/L — SIGNIFICANT CHANGE UP (ref 3.5–5)
POTASSIUM SERPL-SCNC: 4.2 MMOL/L — SIGNIFICANT CHANGE UP (ref 3.5–5)
RBC # BLD: 3.16 M/UL — LOW (ref 4.2–5.4)
RBC # FLD: 21.2 % — HIGH (ref 11.5–14.5)
SODIUM SERPL-SCNC: 143 MMOL/L — SIGNIFICANT CHANGE UP (ref 135–146)
WBC # BLD: 9.38 K/UL — SIGNIFICANT CHANGE UP (ref 4.8–10.8)
WBC # FLD AUTO: 9.38 K/UL — SIGNIFICANT CHANGE UP (ref 4.8–10.8)

## 2019-12-13 PROCEDURE — 99223 1ST HOSP IP/OBS HIGH 75: CPT | Mod: AI

## 2019-12-13 PROCEDURE — 76705 ECHO EXAM OF ABDOMEN: CPT | Mod: 26

## 2019-12-13 PROCEDURE — 99221 1ST HOSP IP/OBS SF/LOW 40: CPT

## 2019-12-13 PROCEDURE — 99223 1ST HOSP IP/OBS HIGH 75: CPT

## 2019-12-13 PROCEDURE — 88189 FLOWCYTOMETRY/READ 16 & >: CPT

## 2019-12-13 PROCEDURE — 93970 EXTREMITY STUDY: CPT | Mod: 26

## 2019-12-13 RX ORDER — AMPICILLIN SODIUM AND SULBACTAM SODIUM 250; 125 MG/ML; MG/ML
3 INJECTION, POWDER, FOR SUSPENSION INTRAMUSCULAR; INTRAVENOUS EVERY 6 HOURS
Refills: 0 | Status: DISCONTINUED | OUTPATIENT
Start: 2019-12-13 | End: 2019-12-16

## 2019-12-13 RX ORDER — SENNA PLUS 8.6 MG/1
2 TABLET ORAL
Qty: 0 | Refills: 0 | DISCHARGE

## 2019-12-13 RX ORDER — AMPICILLIN SODIUM AND SULBACTAM SODIUM 250; 125 MG/ML; MG/ML
INJECTION, POWDER, FOR SUSPENSION INTRAMUSCULAR; INTRAVENOUS
Refills: 0 | Status: DISCONTINUED | OUTPATIENT
Start: 2019-12-13 | End: 2019-12-16

## 2019-12-13 RX ORDER — AMPICILLIN SODIUM AND SULBACTAM SODIUM 250; 125 MG/ML; MG/ML
3 INJECTION, POWDER, FOR SUSPENSION INTRAMUSCULAR; INTRAVENOUS ONCE
Refills: 0 | Status: COMPLETED | OUTPATIENT
Start: 2019-12-13 | End: 2019-12-13

## 2019-12-13 RX ORDER — FOLIC ACID 0.8 MG
1 TABLET ORAL DAILY
Refills: 0 | Status: DISCONTINUED | OUTPATIENT
Start: 2019-12-13 | End: 2019-12-18

## 2019-12-13 RX ORDER — SENNA PLUS 8.6 MG/1
2 TABLET ORAL AT BEDTIME
Refills: 0 | Status: DISCONTINUED | OUTPATIENT
Start: 2019-12-13 | End: 2019-12-17

## 2019-12-13 RX ORDER — METOPROLOL TARTRATE 50 MG
1 TABLET ORAL
Qty: 0 | Refills: 0 | DISCHARGE

## 2019-12-13 RX ORDER — ATORVASTATIN CALCIUM 80 MG/1
40 TABLET, FILM COATED ORAL AT BEDTIME
Refills: 0 | Status: DISCONTINUED | OUTPATIENT
Start: 2019-12-13 | End: 2019-12-18

## 2019-12-13 RX ORDER — FERROUS SULFATE 325(65) MG
325 TABLET ORAL DAILY
Refills: 0 | Status: DISCONTINUED | OUTPATIENT
Start: 2019-12-13 | End: 2019-12-18

## 2019-12-13 RX ORDER — ENOXAPARIN SODIUM 100 MG/ML
40 INJECTION SUBCUTANEOUS AT BEDTIME
Refills: 0 | Status: DISCONTINUED | OUTPATIENT
Start: 2019-12-13 | End: 2019-12-18

## 2019-12-13 RX ORDER — ASPIRIN/CALCIUM CARB/MAGNESIUM 324 MG
81 TABLET ORAL DAILY
Refills: 0 | Status: DISCONTINUED | OUTPATIENT
Start: 2019-12-13 | End: 2019-12-18

## 2019-12-13 RX ORDER — METOPROLOL TARTRATE 50 MG
25 TABLET ORAL DAILY
Refills: 0 | Status: DISCONTINUED | OUTPATIENT
Start: 2019-12-13 | End: 2019-12-18

## 2019-12-13 RX ADMIN — Medication 81 MILLIGRAM(S): at 11:45

## 2019-12-13 RX ADMIN — SENNA PLUS 2 TABLET(S): 8.6 TABLET ORAL at 23:06

## 2019-12-13 RX ADMIN — ENOXAPARIN SODIUM 40 MILLIGRAM(S): 100 INJECTION SUBCUTANEOUS at 23:05

## 2019-12-13 RX ADMIN — Medication 25 MILLIGRAM(S): at 07:03

## 2019-12-13 RX ADMIN — Medication 325 MILLIGRAM(S): at 11:46

## 2019-12-13 RX ADMIN — Medication 1 MILLIGRAM(S): at 11:45

## 2019-12-13 RX ADMIN — PANTOPRAZOLE SODIUM 40 MILLIGRAM(S): 20 TABLET, DELAYED RELEASE ORAL at 02:15

## 2019-12-13 RX ADMIN — AMPICILLIN SODIUM AND SULBACTAM SODIUM 200 GRAM(S): 250; 125 INJECTION, POWDER, FOR SUSPENSION INTRAMUSCULAR; INTRAVENOUS at 18:18

## 2019-12-13 RX ADMIN — CEFTRIAXONE 100 MILLIGRAM(S): 500 INJECTION, POWDER, FOR SOLUTION INTRAMUSCULAR; INTRAVENOUS at 00:15

## 2019-12-13 RX ADMIN — AMPICILLIN SODIUM AND SULBACTAM SODIUM 200 GRAM(S): 250; 125 INJECTION, POWDER, FOR SUSPENSION INTRAMUSCULAR; INTRAVENOUS at 07:03

## 2019-12-13 RX ADMIN — AMPICILLIN SODIUM AND SULBACTAM SODIUM 200 GRAM(S): 250; 125 INJECTION, POWDER, FOR SUSPENSION INTRAMUSCULAR; INTRAVENOUS at 23:05

## 2019-12-13 RX ADMIN — AMPICILLIN SODIUM AND SULBACTAM SODIUM 200 GRAM(S): 250; 125 INJECTION, POWDER, FOR SUSPENSION INTRAMUSCULAR; INTRAVENOUS at 11:45

## 2019-12-13 RX ADMIN — ATORVASTATIN CALCIUM 40 MILLIGRAM(S): 80 TABLET, FILM COATED ORAL at 23:05

## 2019-12-13 NOTE — H&P ADULT - NSICDXPASTMEDICALHX_GEN_ALL_CORE_FT
PAST MEDICAL HISTORY:  Anemia     Colon cancer metastasized to liver Underwent surgical resection 15 years ago    CVA (cerebral vascular accident)     HLD (hyperlipidemia)

## 2019-12-13 NOTE — PROGRESS NOTE ADULT - ASSESSMENT
88 y/o female with L4 compression fracture  - conservative management  - Control pain  - f/u as o/p  - attending to discuss further recommendations

## 2019-12-13 NOTE — CONSULT NOTE ADULT - ASSESSMENT
IMPRESSION: Rehab of Debilitation     PRECAUTIONS: [    x] Cardiac  [ x   ] Respiratory  [    ] Seizures [    ] Contact Isolation  [    ] Droplet Isolation  [    ] Other    Weight Bearing Status:     RECOMMENDATION:    Out of Bed to Chair     DVT/Decubiti Prophylaxis NEED OOB ORDERS PRIOR TO PT    REHAB PLAN:     [ x    ] Bedside P/T 3-5 times a week   [     ] Bedside O/T  2-3 times a week   [     ] No Rehab Therapy Indicated   [     ]  Speech Therapy   Conditioning/ROM                                 ADL  Bed Mobility                                            Conditioning/ROM  Transfers                                                  Bed Mobility  Sitting /Standing Balance                      Transfers                                        Gait Training                                            Sitting/Standing Balance  Stair Training [   ]Applicable                 Home equipment Eval                                                                     Splinting  [   ] Only      GOALS:   ADL   [ x   ]   Independent         Transfers  [x    ] Independent            Ambulation  [ x    ] Independent     [ x    ] With device                            [    ]  CG                                               [    ]  CG                                                    [     ] CG                            [    ] Min A                                          [    ] Min A                                                [     ] Min  A          DISCHARGE PLAN:   [     ]  Good candidate for Intensive Rehabilitation/Hospital based                                             Will tolerate 3hrs Intensive Rehab Daily                                       [ x     ]  Short Term Rehab in Skilled Nursing Facility                              VS                                     [  x    ]  Home with Outpatient or VN services                                         [      ]  Possible Candidate for Intensive Hospital based Rehab

## 2019-12-13 NOTE — SWALLOW BEDSIDE ASSESSMENT ADULT - SLP PERTINENT HISTORY OF CURRENT PROBLEM
pt admitted with decreased ability to walk. PMH: CVA in September 2019 (pontine), colon ca with liver mets, dementia.  No h/o dysphagia, pt reportedly tolerating reg w/ thin at home

## 2019-12-13 NOTE — CONSULT NOTE ADULT - SUBJECTIVE AND OBJECTIVE BOX
HPI:  89 yo F with PMH colon CA with liver s/p resection 15 years back, dementia, anemia, HLD comes to the ED for inability to ambulate for 1 day duration.  Patient needs help in her daily activities but was able to walk a little with walker but yesterday she felt too weak and was unable to walk.  She does not complain of pain anywhere in the body or any fall or trauma.  No SOB, chest pain, abd pain, dysuria, headache, or back pain.  She was admitted in september for pontine stroke with left sided weakness discharged on home physical therapy.  She has progressive dementia likely vascular dementia takes aspirin and statin.  She uses oxygen at home on/off for the lung condition that was prescribed to her 2 years ago; daughter is unaware of the lung problem.  patient has a home health aid and daughter is daughter is directly involved in patient care. (13 Dec 2019 03:50)      PAST MEDICAL & SURGICAL HISTORY:  CVA (cerebral vascular accident)  Anemia  HLD (hyperlipidemia)  Colon cancer metastasized to liver: Underwent surgical resection 15 years ago  History of colon resection      MEDICATIONS  (STANDING):  ampicillin/sulbactam  IVPB      ampicillin/sulbactam  IVPB 3 Gram(s) IV Intermittent once  ampicillin/sulbactam  IVPB 3 Gram(s) IV Intermittent every 6 hours  aspirin  chewable 81 milliGRAM(s) Oral daily  atorvastatin 40 milliGRAM(s) Oral at bedtime  enoxaparin Injectable 40 milliGRAM(s) SubCutaneous at bedtime  ferrous sulfate Oral Tab/Cap - Peds 325 milliGRAM(s) Oral daily  folic acid 1 milliGRAM(s) Oral daily  metoprolol succinate ER 25 milliGRAM(s) Oral daily  senna 2 Tablet(s) Oral at bedtime    MEDICATIONS  (PRN):      Allergies    No Known Allergies    Intolerances          Vital Signs Last 24 Hrs  T(C): 36.7 (13 Dec 2019 02:52), Max: 38.5 (12 Dec 2019 20:45)  T(F): 98 (13 Dec 2019 02:52), Max: 101.3 (12 Dec 2019 20:45)  HR: 70 (13 Dec 2019 03:05) (67 - 88)  BP: 120/58 (13 Dec 2019 03:05) (111/56 - 145/63)  BP(mean): --  RR: 18 (13 Dec 2019 03:05) (16 - 20)  SpO2: 98% (13 Dec 2019 03:05) (95% - 99%)    PHYSICAL EXAM:    GENERAL: NAD, awake,   HEAD:  Atraumatic, Normocephalic  EYES: EOMI, PERRLA, conjunctiva and sclera clear  NERVOUS SYSTEM:  Awake  alert oriented to self, place situation   Fund of knowledge, recent and remote memory are intact   Mood; normal affect   Face symmetrical tongue is midline speech is clear and fluent  Motor: 5/5 UE/LE all muscle groups   Sensory: No deficit to light touch   Gait is not tested      EXTREMITIES:  2+ Peripheral Pulses, No clubbing, cyanosis, or edema      LABS:                        7.5    11.86 )-----------( 200      ( 12 Dec 2019 19:14 )             24.7         137  |  100  |  15  ----------------------------<  129<H>  4.4   |  24  |  0.8    Ca    8.6      12 Dec 2019 19:14    TPro  6.4  /  Alb  3.7  /  TBili  0.8  /  DBili  x   /  AST  16  /  ALT  6   /  AlkPhos  53  12    PT/INR - ( 12 Dec 2019 19:14 )   PT: 14.60 sec;   INR: 1.27 ratio         PTT - ( 12 Dec 2019 19:14 )  PTT:24.3 sec  Urinalysis Basic - ( 12 Dec 2019 18:57 )    Color: Light Yellow / Appearance: Clear / S.011 / pH: x  Gluc: x / Ketone: Negative  / Bili: Negative / Urobili: <2 mg/dL   Blood: x / Protein: Negative / Nitrite: Negative   Leuk Esterase: Negative / RBC: x / WBC x   Sq Epi: x / Non Sq Epi: x / Bacteria: x        RADIOLOGY & ADDITIONAL TESTS:  < from: CT Lumbar Spine No Cont (19 @ 20:48) >    EXAM:  CT LUMBAR SPINE            PROCEDURE DATE:  2019            INTERPRETATION:  Clinical History / Reason for exam: Leg weakness.    Technique: Multiple contiguous axial images were obtained from T12-L1   through L5-S1 with sagittal and coronal reformatted images without   administration of intravenous contrast.    Correlation with CT abdomen and pelvis 2014.    Findings:    There is mild straightening of the normal lumbar lordosis. The bones are   osteopenic.    There is a moderate compression deformity of the L4 vertebral body, age   indeterminate, and new since 2014.    The remaining vertebral heights are maintained. The alignment is   maintained.    There is multilevel loss of intervertebral disc space.    At T12-L1 through through L2-L3 there is no evidence of disk herniation,   spinal canal or neural foraminal stenosis.    At L3-L4 and L4-L5, there is disc bulging with associated endplate   spondylosis and facet and ligament hypertrophy with moderate spinal canal   stenosis and mild bilateral foraminal narrowing.    At L5-S1 there is disc bulging with endplate spondylosis and facet   hypertrophy contributing to mild spinal canal stenosis and no significant   neural foraminal narrowing.      Diffuse aortic calcifications are noted. Partially imaged renal cysts.      IMPRESSION:    Moderate compression deformity of the L4 vertebral body,   age-indeterminate and new since 2014.    Multilevel degenerative changes as detailed above.    Additional findings: On coronal imaging note is made of an enlarged   spleen measuring at least 14 cm, partially imaged, new from 2014   examination              MICHELE RESENDEZ M.D., RESIDENT RADIOLOGIST  This document has been electronically signed.  MAKENZIE MARIE M.D., ATTENDING RADIOLOGIST  This document has been electronically signed. Dec 13 2019 12:21AM                < end of copied text >

## 2019-12-13 NOTE — PROGRESS NOTE ADULT - SUBJECTIVE AND OBJECTIVE BOX
INTERVAL HPI/OVERNIGHT EVENTS:    HPI:  88 yo F with PMH colon CA with liver s/p resection 15 years back, dementia, anemia, HLD comes to the ED for inability to ambulate for 1 day duration.  Patient needs help in her daily activities but was able to walk a little with walker but yesterday she felt too weak and was unable to walk.  She does not complain of pain anywhere in the body or any fall or trauma.  No SOB, chest pain, abd pain, dysuria, headache, or back pain.  She was admitted in september for pontine stroke with left sided weakness discharged on home physical therapy.  She has progressive dementia likely vascular dementia takes aspirin and statin.  She uses oxygen at home on/off for the lung condition that was prescribed to her 2 years ago; daughter is unaware of the lung problem.  patient has a home health aid and daughter is daughter is directly involved in patient care. (13 Dec 2019 03:50)      PAST MEDICAL & SURGICAL HISTORY:  CVA (cerebral vascular accident)  Anemia  HLD (hyperlipidemia)  Colon cancer metastasized to liver: Underwent surgical resection 15 years ago  History of colon resection      MEDICATIONS  (STANDING):  ampicillin/sulbactam  IVPB      ampicillin/sulbactam  IVPB 3 Gram(s) IV Intermittent once  ampicillin/sulbactam  IVPB 3 Gram(s) IV Intermittent every 6 hours  aspirin  chewable 81 milliGRAM(s) Oral daily  atorvastatin 40 milliGRAM(s) Oral at bedtime  enoxaparin Injectable 40 milliGRAM(s) SubCutaneous at bedtime  ferrous sulfate Oral Tab/Cap - Peds 325 milliGRAM(s) Oral daily  folic acid 1 milliGRAM(s) Oral daily  metoprolol succinate ER 25 milliGRAM(s) Oral daily  senna 2 Tablet(s) Oral at bedtime    MEDICATIONS  (PRN):      Allergies    No Known Allergies    Intolerances          Vital Signs Last 24 Hrs  T(C): 36.7 (13 Dec 2019 02:52), Max: 38.5 (12 Dec 2019 20:45)  T(F): 98 (13 Dec 2019 02:52), Max: 101.3 (12 Dec 2019 20:45)  HR: 70 (13 Dec 2019 03:05) (67 - 88)  BP: 120/58 (13 Dec 2019 03:05) (111/56 - 145/63)  BP(mean): --  RR: 18 (13 Dec 2019 03:05) (16 - 20)  SpO2: 98% (13 Dec 2019 03:05) (95% - 99%)    PHYSICAL EXAM:    GENERAL: NAD, well-groomed, well-developed  HEAD:  Atraumatic, Normocephalic  EYES: EOMI, PERRLA, conjunctiva and sclera clear  NERVOUS SYSTEM:  Awake  alert oriented to self, place situation   Fund of knowledge, recent and remote memory are intact   Mood; normal affect   CN II-XII intact PERRRL, EOMI, no ptosis, no Nystagmus, normal shoulder shrug   Face symmetrical tongue is midline speech is clear and fluent  Motor: 5/5 UE/LE all muscle groups   Sensory: No deficit to light touch   Gait is not tested      EXTREMITIES:  2+ Peripheral Pulses, No clubbing, cyanosis, or edema      LABS:                        7.5    11.86 )-----------( 200      ( 12 Dec 2019 19:14 )             24.7         137  |  100  |  15  ----------------------------<  129<H>  4.4   |  24  |  0.8    Ca    8.6      12 Dec 2019 19:14    TPro  6.4  /  Alb  3.7  /  TBili  0.8  /  DBili  x   /  AST  16  /  ALT  6   /  AlkPhos  53      PT/INR - ( 12 Dec 2019 19:14 )   PT: 14.60 sec;   INR: 1.27 ratio         PTT - ( 12 Dec 2019 19:14 )  PTT:24.3 sec  Urinalysis Basic - ( 12 Dec 2019 18:57 )    Color: Light Yellow / Appearance: Clear / S.011 / pH: x  Gluc: x / Ketone: Negative  / Bili: Negative / Urobili: <2 mg/dL   Blood: x / Protein: Negative / Nitrite: Negative   Leuk Esterase: Negative / RBC: x / WBC x   Sq Epi: x / Non Sq Epi: x / Bacteria: x        RADIOLOGY & ADDITIONAL TESTS:  < from: CT Lumbar Spine No Cont (19 @ 20:48) >    EXAM:  CT LUMBAR SPINE            PROCEDURE DATE:  2019            INTERPRETATION:  Clinical History / Reason for exam: Leg weakness.    Technique: Multiple contiguous axial images were obtained from T12-L1   through L5-S1 with sagittal and coronal reformatted images without   administration of intravenous contrast.    Correlation with CT abdomen and pelvis 2014.    Findings:    There is mild straightening of the normal lumbar lordosis. The bones are   osteopenic.    There is a moderate compression deformity of the L4 vertebral body, age   indeterminate, and new since 2014.    The remaining vertebral heights are maintained. The alignment is   maintained.    There is multilevel loss of intervertebral disc space.    At T12-L1 through through L2-L3 there is no evidence of disk herniation,   spinal canal or neural foraminal stenosis.    At L3-L4 and L4-L5, there is disc bulging with associated endplate   spondylosis and facet and ligament hypertrophy with moderate spinal canal   stenosis and mild bilateral foraminal narrowing.    At L5-S1 there is disc bulging with endplate spondylosis and facet   hypertrophy contributing to mild spinal canal stenosis and no significant   neural foraminal narrowing.      Diffuse aortic calcifications are noted. Partially imaged renal cysts.      IMPRESSION:    Moderate compression deformity of the L4 vertebral body,   age-indeterminate and new since 2014.    Multilevel degenerative changes as detailed above.    Additional findings: On coronal imaging note is made of an enlarged   spleen measuring at least 14 cm, partially imaged, new from 2014   examination              MICHELE RESENDEZ M.D., RESIDENT RADIOLOGIST  This document has been electronically signed.  MAKENZIE MARIE M.D., ATTENDING RADIOLOGIST  This document has been electronically signed. Dec 13 2019 12:21AM                < end of copied text >

## 2019-12-13 NOTE — H&P ADULT - ASSESSMENT
Inability to Ambulate:  likely combination of worsening functional status/ progression of dementia/ Anemia/Vertebral compression fracture  no focal deficit  CT head and CT lumbar spine done  L4 compression deformity of spine  Likely conservative management  neurosurgery consult  Pt/Rehab Inability to Ambulate:  likely combination of worsening functional status/ progression of dementia/ Anemia/Vertebral compression fracture  no focal deficit  CT head and CT lumbar spine done  L4 compression deformity of spine  Likely conservative management  neurosurgery consult  Pt/Rehab    Anemia:  anemia of chronic disease  spleenomegaly on CT abdomen (partially visualized)  ultrasound spleen  hematology consult as per family request    Dementia:  not on medication  likely vascular dementia  progressing    Colon cancer with liver mets:  s/p resection 15 years ago  in remission    H/o Ischemic CVA with left sided weakness in september  no focal deficit now  cw aspirin    Diet: dysphagia 3          speech and swallow    DVT ppx: lovenox    DNR/DNI; daughter is health care proxy; she will come during the day and complete the molst form Inability to Ambulate:  likely combination of worsening functional status/ progression of dementia/ Anemia/Vertebral compression fracture  no focal deficit  CT head and CT lumbar spine done  L4 compression fracture of spine  Likely conservative management  neurosurgery consult  Pt/Rehab    Anemia:  anemia of chronic disease  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 vascular dementia  progressing  continue aspirin and statin    Colon cancer with liver mets:  s/p resection 15 years ago  in remission    H/o Ischemic CVA with left sided weakness in september  no focal deficit now  cw aspirin and statin    Diet: dysphagia 3          speech and swallow    DVT ppx: lovenox    DNR/DNI; daughter is health care proxy; she will come during the day and complete the molst form Inability to Ambulate:  likely combination of worsening functional status/ progression of dementia/ Anemia/Vertebral compression fracture  no focal deficit  CT head and CT lumbar spine done  L4 compression fracture of spine  Likely conservative management  neurosurgery consult  Pt/Rehab    Anemia:  anemia of chronic disease  s/p one unit PRBC by ED as ED was told by the daughter that she gets these weakness when more anemia and gets better with transfusion.  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 vascular dementia  progressing  continue aspirin and statin    Interstitial Lung disease:  diagnosed in 2017 (refer one content)  was discharged on home o2  patient did not follow up with pulmonary   on/off oxygen use at home    Colon cancer with liver mets:  s/p resection 15 years ago  in remission    H/o Ischemic CVA with left sided weakness in september  no focal deficit now  cw aspirin and statin    Diet: dysphagia 3          speech and swallow    DVT ppx: lovenox    DNR/DNI; daughter is health care proxy; she will come during the day and complete the molst form

## 2019-12-13 NOTE — CONSULT NOTE ADULT - SUBJECTIVE AND OBJECTIVE BOX
Patient is a 89y old  Female who presents with a chief complaint of inability to ambulate (13 Dec 2019 06:52)    HPI:  89 yo F with PMH colon CA with liver s/p resection 15 years back, dementia, anemia, HLD comes to the ED for inability to ambulate for 1 day duration.  Patient needs help in her daily activities but was able to walk a little with walker but yesterday she felt too weak and was unable to walk.  She does not complain of pain anywhere in the body or any fall or trauma.  No SOB, chest pain, abd pain, dysuria, headache, or back pain.  She was admitted in september for pontine stroke with left sided weakness discharged on home physical therapy.  She has progressive dementia likely vascular dementia takes aspirin and statin.  She uses oxygen at home on/off for the lung condition that was prescribed to her 2 years ago; daughter is unaware of the lung problem.  patient has a home health aid and daughter is daughter is directly involved in patient care. (13 Dec 2019 03:50)      PAST MEDICAL & SURGICAL HISTORY:  CVA (cerebral vascular accident)  Anemia  HLD (hyperlipidemia)  Colon cancer metastasized to liver: Underwent surgical resection 15 years ago  History of colon resection      Hospital Course: nability to Ambulate:  likely combination of worsening functional status/ progression of dementia/ Anemia/Vertebral compression fracture  no focal deficit  CT head and CT lumbar spine done  L4 compression fracture of spine  Likely conservative management  neurosurgery consult  Pt/Rehab    Anemia:  anemia of chronic disease  s/p one unit PRBC by ED as ED was told by the daughter that she gets these weakness when more anemia and gets better with transfusion.  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 vascular dementia  progressing  continue aspirin and statin    Interstitial Lung disease:  diagnosed in 2017 (refer one content)  was discharged on home o2  patient did not follow up with pulmonary   on/off oxygen use at home    Colon cancer with liver mets:  s/p resection 15 years ago  in remission    H/o Ischemic CVA with left sided weakness in september  no focal deficit now  cw aspirin and statin    Diet: dysphagia 3          speech and swallow      TODAY'S SUBJECTIVE & REVIEW OF SYMPTOMS:     Constitutional WNL   Cardio WNL   Resp WNL   GI WNL  Heme WNL  Endo WNL  Skin WNL  MSK WNL  Neuro WNL  Cognitive WNL  Psych WNL      MEDICATIONS  (STANDING):  ampicillin/sulbactam  IVPB      ampicillin/sulbactam  IVPB 3 Gram(s) IV Intermittent every 6 hours  aspirin  chewable 81 milliGRAM(s) Oral daily  atorvastatin 40 milliGRAM(s) Oral at bedtime  enoxaparin Injectable 40 milliGRAM(s) SubCutaneous at bedtime  ferrous sulfate Oral Tab/Cap - Peds 325 milliGRAM(s) Oral daily  folic acid 1 milliGRAM(s) Oral daily  metoprolol succinate ER 25 milliGRAM(s) Oral daily  senna 2 Tablet(s) Oral at bedtime    MEDICATIONS  (PRN):      FAMILY HISTORY:      Allergies    No Known Allergies    Intolerances        SOCIAL HISTORY:    [    ] Etoh  [    ] Smoking  [    ] Substance abuse     Home Environment:  [    ] Home Alone  [  x  ] Lives with Family  [  x  ] Home Health Aid    Dwelling:  [    ] Apartment  [   x ] Private House  [    ] Adult Home  [    ] Skilled Nursing Facility      [    ] Short Term  [    ] Long Term  [    ] Stairs                           [    ] Elevator     FUNCTIONAL STATUS PTA: (Check all that apply)  Ambulation: [     ]Independent    [   x ] Dependent     [    ] Non-Ambulatory  Assistive Device: [    ] SA Cane  [    ]  Q Cane  [    x] Walker  [    ]  Wheelchair  ADL : [    ] Independent  [   x ]  Dependent       Vital Signs Last 24 Hrs  T(C): 36.7 (13 Dec 2019 02:52), Max: 38.5 (12 Dec 2019 20:45)  T(F): 98 (13 Dec 2019 02:52), Max: 101.3 (12 Dec 2019 20:45)  HR: 70 (13 Dec 2019 03:05) (67 - 88)  BP: 120/58 (13 Dec 2019 03:05) (111/56 - 145/63)  BP(mean): --  RR: 18 (13 Dec 2019 03:05) (16 - 20)  SpO2: 98% (13 Dec 2019 03:05) (95% - 99%)      PHYSICAL EXAM: Alert & Oriented X1 confused poor historian on O2  GENERAL: NAD, well-groomed, well-developed  HEAD:  Atraumatic, Normocephalic  EYES: EOMI, PERRLA, conjunctiva and sclera clear  NECK: Supple  CHEST/LUNG: Clear bilaterally  HEART: Regular rate and rhythm  ABDOMEN: Soft, Nontender, Nondistended; Bowel sounds present  EXTREMITIES:  no calf tenderness,no edema BLES    NERVOUS SYSTEM:  Cranial Nerves 2-12 intact [  x  ] Abnormal  [    ]  ROM: WFL all extremities [   x ]  Abnormal [     ]  Motor Strength: WFL all extremities  [   x ]  Abnormal [    ]  Sensation: intact to light touch [    ] Abnormal [    ]  Reflexes: Symmetric [    ]  Abnormal [    ]    FUNCTIONAL STATUS:  Bed Mobility: [   ]  Independent [    ]  Supervision [  x  ]  Needs Assistance [  ]  N/A  Transfers: [    ]  Independent [    ]  Supervision [    ]  Needs Assistance [  x  ]  N/A    Ambulation:  [    ]  Independent [    ]  Supervision [    ]  Needs Assistance [ x   ]  N/A   ADL:  [    ]   Independent [ x   ] Requires Assistance [    ] N/A   NO OOB ORDERS    LABS:                        7.5    11.86 )-----------( 200      ( 12 Dec 2019 19:14 )             24.7         137  |  100  |  15  ----------------------------<  129<H>  4.4   |  24  |  0.8    Ca    8.6      12 Dec 2019 19:14    TPro  6.4  /  Alb  3.7  /  TBili  0.8  /  DBili  x   /  AST  16  /  ALT  6   /  AlkPhos  53  1212    PT/INR - ( 12 Dec 2019 19:14 )   PT: 14.60 sec;   INR: 1.27 ratio         PTT - ( 12 Dec 2019 19:14 )  PTT:24.3 sec  Urinalysis Basic - ( 12 Dec 2019 18:57 )    Color: Light Yellow / Appearance: Clear / S.011 / pH: x  Gluc: x / Ketone: Negative  / Bili: Negative / Urobili: <2 mg/dL   Blood: x / Protein: Negative / Nitrite: Negative   Leuk Esterase: Negative / RBC: x / WBC x   Sq Epi: x / Non Sq Epi: x / Bacteria: x        RADIOLOGY & ADDITIONAL STUDIES:

## 2019-12-13 NOTE — CONSULT NOTE ADULT - ATTENDING COMMENTS
Patient seen and examined.  Imaging reviewed.  No indication for neurosurgical intervention at this time.  Continue PT.
pt seen and examined Agree with above A/P,  Peripheral smear reviewed. Plan as noted above d/w pt and her daughter

## 2019-12-13 NOTE — CONSULT NOTE ADULT - SUBJECTIVE AND OBJECTIVE BOX
Patient is a 89y old  Female who presents with a chief complaint of inability to ambulate (13 Dec 2019 12:34)    HPI:  88 yo F with PMH colon CA with liver s/p resection 15 years back, dementia, anemia, HLD comes to the ED for inability to ambulate for 1 day duration.  Patient needs help in her daily activities but was able to walk a little with walker but yesterday she felt too weak and was unable to walk.  She does not complain of pain anywhere in the body or any fall or trauma.  No SOB, chest pain, abd pain, dysuria, headache, or back pain.  She was admitted in september for pontine stroke with left sided weakness discharged on home physical therapy.  She has progressive dementia likely vascular dementia takes aspirin and statin.  She uses oxygen at home on/off for the lung condition that was prescribed to her 2 years ago; daughter is unaware of the lung problem.  patient has a home health aid and daughter is daughter is directly involved in patient care. (13 Dec 2019 03:50)    PAST MEDICAL & SURGICAL HISTORY:  CVA (cerebral vascular accident)  Anemia  HLD (hyperlipidemia)  Colon cancer metastasized to liver: Underwent surgical resection 15 years ago  History of colon resection    SOCIAL HISTORY:   (-) x3    FAMILY HISTORY: non-contributory    Allergies:  No Known Allergies    ROS:  Negative except for: inability to ambulate    Height (cm): 162.56 (19 @ 19:33)  Weight (kg): 80.3 (19 @ 19:33)  BMI (kg/m2): 30.4 (19 @ 19:33)  BSA (m2): 1.86 (19 @ 19:33)      HOME MEDICATIONS:  senna oral tablet: 2 tab(s) orally once a day (13 Dec 2019 04:24)    Vital Signs Last 24 Hrs  T(C): 36.7 (13 Dec 2019 02:52), Max: 38.5 (12 Dec 2019 20:45)  T(F): 98 (13 Dec 2019 02:52), Max: 101.3 (12 Dec 2019 20:45)  HR: 70 (13 Dec 2019 03:05) (67 - 88)  BP: 120/58 (13 Dec 2019 03:05) (111/56 - 145/63)  BP(mean): --  RR: 18 (13 Dec 2019 03:05) (16 - 20)  SpO2: 98% (13 Dec 2019 03:05) (95% - 99%)    PHYSICAL EXAM  General: adult in NAD  HEENT: clear oropharynx, anicteric sclera  Neck: supple  CV: normal S1/S2 with no murmur rubs or gallops  Lungs: course b/l bs  Abdomen: soft non-tender non-distended  Ext: no clubbing cyanosis or edema  Skin: no rashes and no petechiae  Neuro: alert and oriented X 1, no focal deficits    MEDICATIONS  (STANDING):  ampicillin/sulbactam  IVPB      ampicillin/sulbactam  IVPB 3 Gram(s) IV Intermittent every 6 hours  aspirin  chewable 81 milliGRAM(s) Oral daily  atorvastatin 40 milliGRAM(s) Oral at bedtime  enoxaparin Injectable 40 milliGRAM(s) SubCutaneous at bedtime  ferrous sulfate Oral Tab/Cap - Peds 325 milliGRAM(s) Oral daily  folic acid 1 milliGRAM(s) Oral daily  metoprolol succinate ER 25 milliGRAM(s) Oral daily  senna 2 Tablet(s) Oral at bedtime    LABS:                        8.1    9.38  )-----------( 177      ( 13 Dec 2019 11:07 )             26.2     Mean Cell Volume : 82.9 fL  Mean Cell Hemoglobin : 25.6 pg  Mean Cell Hemoglobin Concentration : 30.9 g/dL  Auto Neutrophil # : x  Auto Lymphocyte # : x  Auto Monocyte # : x  Auto Eosinophil # : x  Auto Basophil # : x  Auto Neutrophil % : x  Auto Lymphocyte % : x  Auto Monocyte % : x  Auto Eosinophil % : x  Auto Basophil % : x    Serial CBC's   @ 11:07  Hct-26.2 / Hgb-8.1 / Plat-177 / RBC-3.16 / WBC-9.38  Serial CBC's   @ 21:02  Hct--- / Hgb--- / Plat--- / RBC-2.91 / WBC---  Serial CBC's   @ 19:14  Hct-24.7 / Hgb-7.5 / Plat-200 / RBC-2.94 / WBC-11.86        137  |  100  |  15  ----------------------------<  129<H>  4.4   |  24  |  0.8    Ca    8.6      12 Dec 2019 19:14    TPro  6.4  /  Alb  3.7  /  TBili  0.8  /  DBili  x   /  AST  16  /  ALT  6   /  AlkPhos  53      PT/INR - ( 12 Dec 2019 19:14 )   PT: 14.60 sec;   INR: 1.27 ratio      PTT - ( 12 Dec 2019 19:14 )  PTT:24.3 sec    Reticulocyte Percent: 1.6 % ( @ 21:02)    Urinalysis Basic - ( 12 Dec 2019 18:57 )    Color: Light Yellow / Appearance: Clear / S.011 / pH: x  Gluc: x / Ketone: Negative  / Bili: Negative / Urobili: <2 mg/dL   Blood: x / Protein: Negative / Nitrite: Negative   Leuk Esterase: Negative / RBC: x / WBC x   Sq Epi: x / Non Sq Epi: x / Bacteria: x    RADIOLOGY & ADDITIONAL STUDIES:  < from: US Spleen (19 @ 08:40) >  Impression  Splenomegaly    < end of copied text >    < from: CT Lumbar Spine No Cont (19 @ 20:48) >  IMPRESSION:    Moderate compression deformity of the L4 vertebral body,   age-indeterminate and new since 2014.    Multilevel degenerative changes as detailed above.    Additional findings: On coronal imaging note is made of an enlarged   spleen measuring at least 14 cm, partially imaged, new from    examination    < end of copied text >    < from: CT Head No Cont (19 @ 20:48) >  IMPRESSION:     1.  No evidence of acute intracranial hemorrhage, mass effect or   territorial infarct.    2.  Stable severe chronic microvascular changes.    < end of copied text > Patient is a 89y old  Female who presents with a chief complaint of inability to ambulate (13 Dec 2019 12:34)    HPI:  88 yo F with PMH colon CA with liver s/p resection 15 years back, dementia, anemia, HLD comes to the ED for inability to ambulate for 1 day duration.  Patient needs help in her daily activities but was able to walk a little with walker but yesterday she felt too weak and was unable to walk.  She does not complain of pain anywhere in the body or any fall or trauma.  No SOB, chest pain, abd pain, dysuria, headache, or back pain.  She was admitted in september for pontine stroke with left sided weakness discharged on home physical therapy.  She has progressive dementia likely vascular dementia takes aspirin and statin.  She uses oxygen at home on/off for the lung condition that was prescribed to her 2 years ago; daughter is unaware of the lung problem.  patient has a home health aid and daughter is daughter is directly involved in patient care. (13 Dec 2019 03:50)    PAST MEDICAL & SURGICAL HISTORY:  CVA (cerebral vascular accident)  Anemia  HLD (hyperlipidemia)  Colon cancer metastasized to liver: Underwent surgical resection 15 years ago  History of colon resection    SOCIAL HISTORY:   (-) x3    FAMILY HISTORY: non-contributory    Allergies:  No Known Allergies    ROS:  Negative except for: inability to ambulate    Height (cm): 162.56 (19 @ 19:33)  Weight (kg): 80.3 (19 @ 19:33)  BMI (kg/m2): 30.4 (19 @ 19:33)  BSA (m2): 1.86 (19 @ 19:33)      HOME MEDICATIONS:  senna oral tablet: 2 tab(s) orally once a day (13 Dec 2019 04:24)    Vital Signs Last 24 Hrs  T(C): 36.7 (13 Dec 2019 02:52), Max: 38.5 (12 Dec 2019 20:45)  T(F): 98 (13 Dec 2019 02:52), Max: 101.3 (12 Dec 2019 20:45)  HR: 70 (13 Dec 2019 03:05) (67 - 88)  BP: 120/58 (13 Dec 2019 03:05) (111/56 - 145/63)  BP(mean): --  RR: 18 (13 Dec 2019 03:05) (16 - 20)  SpO2: 98% (13 Dec 2019 03:05) (95% - 99%)    PHYSICAL EXAM  General: adult in NAD  HEENT: clear oropharynx, anicteric sclera  Neck: supple  CV: normal S1/S2 with no murmur rubs or gallops  Lungs: course b/l bs  Abdomen: soft non-tender non-distended  Ext: no clubbing cyanosis or edema  Skin: no rashes and no petechiae  Neuro: alert and oriented X 1, no focal deficits    MEDICATIONS  (STANDING):  ampicillin/sulbactam  IVPB      ampicillin/sulbactam  IVPB 3 Gram(s) IV Intermittent every 6 hours  aspirin  chewable 81 milliGRAM(s) Oral daily  atorvastatin 40 milliGRAM(s) Oral at bedtime  enoxaparin Injectable 40 milliGRAM(s) SubCutaneous at bedtime  ferrous sulfate Oral Tab/Cap - Peds 325 milliGRAM(s) Oral daily  folic acid 1 milliGRAM(s) Oral daily  metoprolol succinate ER 25 milliGRAM(s) Oral daily  senna 2 Tablet(s) Oral at bedtime    LABS:                        8.1    9.38  )-----------( 177      ( 13 Dec 2019 11:07 )             26.2     Mean Cell Volume : 82.9 fL  Mean Cell Hemoglobin : 25.6 pg  Mean Cell Hemoglobin Concentration : 30.9 g/dL  Auto Neutrophil # : x  Auto Lymphocyte # : x  Auto Monocyte # : x  Auto Eosinophil # : x  Auto Basophil # : x  Auto Neutrophil % : x  Auto Lymphocyte % : x  Auto Monocyte % : x  Auto Eosinophil % : x  Auto Basophil % : x    Serial CBC's   @ 11:07  Hct-26.2 / Hgb-8.1 / Plat-177 / RBC-3.16 / WBC-9.38  Serial CBC's   @ 21:02  Hct--- / Hgb--- / Plat--- / RBC-2.91 / WBC---  Serial CBC's   @ 19:14  Hct-24.7 / Hgb-7.5 / Plat-200 / RBC-2.94 / WBC-11.86        137  |  100  |  15  ----------------------------<  129<H>  4.4   |  24  |  0.8    Ca    8.6      12 Dec 2019 19:14    TPro  6.4  /  Alb  3.7  /  TBili  0.8  /  DBili  x   /  AST  16  /  ALT  6   /  AlkPhos  53      PT/INR - ( 12 Dec 2019 19:14 )   PT: 14.60 sec;   INR: 1.27 ratio      PTT - ( 12 Dec 2019 19:14 )  PTT:24.3 sec    Reticulocyte Percent: 1.6 % ( @ 21:02)    Urinalysis Basic - ( 12 Dec 2019 18:57 )    Color: Light Yellow / Appearance: Clear / S.011 / pH: x  Gluc: x / Ketone: Negative  / Bili: Negative / Urobili: <2 mg/dL   Blood: x / Protein: Negative / Nitrite: Negative   Leuk Esterase: Negative / RBC: x / WBC x   Sq Epi: x / Non Sq Epi: x / Bacteria: x    RADIOLOGY & ADDITIONAL STUDIES:  < from: US Spleen (19 @ 08:40) >  Impression  Splenomegaly    < end of copied text >    < from: CT Lumbar Spine No Cont (19 @ 20:48) >  IMPRESSION:    Moderate compression deformity of the L4 vertebral body,   age-indeterminate and new since 2014.    Multilevel degenerative changes as detailed above.    Additional findings: On coronal imaging note is made of an enlarged   spleen measuring at least 14 cm, partially imaged, new from    examination    < end of copied text >    < from: CT Head No Cont (19 @ 20:48) >  IMPRESSION:     1.  No evidence of acute intracranial hemorrhage, mass effect or   territorial infarct.    2.  Stable severe chronic microvascular changes.    < end of copied text >    BLOOD SMEAR INTERPRETATION: Teardrop cell, mekgakaryocyte fragment

## 2019-12-13 NOTE — H&P ADULT - NSHPLABSRESULTS_GEN_ALL_CORE
7.5    11.86 )-----------( 200      ( 12 Dec 2019 19:14 )             24.7                 137  |  100  |  15  ----------------------------<  129<H>  4.4   |  24  |  0.8    Ca    8.6      12 Dec 2019 19:14    TPro  6.4  /  Alb  3.7  /  TBili  0.8  /  DBili  x   /  AST  16  /  ALT  6   /  AlkPhos  53      LIVER FUNCTIONS - ( 12 Dec 2019 19:14 )  Alb: 3.7 g/dL / Pro: 6.4 g/dL / ALK PHOS: 53 U/L / ALT: 6 U/L / AST: 16 U/L / GGT: x         PT/INR - ( 12 Dec 2019 19:14 )   PT: 14.60 sec;   INR: 1.27 ratio      PTT - ( 12 Dec 2019 19:14 )  PTT:24.3 sec  CARDIAC MARKERS ( 12 Dec 2019 19:14 )  x     / <0.01 ng/mL / 18 U/L / x     / <1.0 ng/mL    Urinalysis Basic - ( 12 Dec 2019 18:57 )    Color: Light Yellow / Appearance: Clear / S.011 / pH: x  Gluc: x / Ketone: Negative  / Bili: Negative / Urobili: <2 mg/dL   Blood: x / Protein: Negative / Nitrite: Negative   Leuk Esterase: Negative / RBC: x / WBC x   Sq Epi: x / Non Sq Epi: x / Bacteria: x    Serum Pro-Brain Natriuretic Peptide: 879 pg/mL (19 @ 19:14) 7.5    11.86 )-----------( 200      ( 12 Dec 2019 19:14 )             24.7                     137  |  100  |  15  ----------------------------<  129<H>  4.4   |  24  |  0.8    Ca    8.6      12 Dec 2019 19:14    TPro  6.4  /  Alb  3.7  /  TBili  0.8  /  DBili  x   /  AST  16  /  ALT  6   /  AlkPhos  53      LIVER FUNCTIONS - ( 12 Dec 2019 19:14 )  Alb: 3.7 g/dL / Pro: 6.4 g/dL / ALK PHOS: 53 U/L / ALT: 6 U/L / AST: 16 U/L / GGT: x         PT/INR - ( 12 Dec 2019 19:14 )   PT: 14.60 sec;   INR: 1.27 ratio      PTT - ( 12 Dec 2019 19:14 )  PTT:24.3 sec  CARDIAC MARKERS ( 12 Dec 2019 19:14 )  x     / <0.01 ng/mL / 18 U/L / x     / <1.0 ng/mL    Urinalysis Basic - ( 12 Dec 2019 18:57 )    Color: Light Yellow / Appearance: Clear / S.011 / pH: x  Gluc: x / Ketone: Negative  / Bili: Negative / Urobili: <2 mg/dL   Blood: x / Protein: Negative / Nitrite: Negative   Leuk Esterase: Negative / RBC: x / WBC x   Sq Epi: x / Non Sq Epi: x / Bacteria: x    Serum Pro-Brain Natriuretic Peptide: 879 pg/mL (19 @ 19:14)

## 2019-12-13 NOTE — H&P ADULT - HISTORY OF PRESENT ILLNESS
89 yo F with PMH colon CA with liver s/p resection many years back, dementia, anemia, HLD comes to the ED for inability to ambulate for 1 day duration.   new onset noticed at 5pm tonight in b/l LE. Pt had increased weakness noticed by health aid at 2pm. Pt ambulates with assistance but hasn't been able to ambulate today. No SOB, no cp, no abd pain, no dysuria, no fevers, no HA, no back pain. 89 yo F with PMH colon CA with liver s/p resection many years back, dementia, anemia, HLD comes to the ED for inability to ambulate for 1 day duration.  new onset noticed at 5pm tonight in b/l LE. Pt had increased weakness noticed by health aid at 2pm. Pt ambulates with assistance but hasn't been able to ambulate today. No SOB, no cp, no abd pain, no dysuria, no fevers, no HA, no back pain. 89 yo F with PMH colon CA with liver s/p resection 15 years back, dementia, anemia, HLD comes to the ED for inability to ambulate for 1 day duration.  Patient needs help in her daily activities but was able to walk a little with walker but yesterday she felt too weak and was unable to walk.  She does not complain of pain anywhere in the body or any fall or trauma.  No SOB, chest pain, abd pain, dysuria, headache, or back pain.  She was admitted in september for pontine stroke with left sided weakness discharged on home physical therapy.  She has progressive dementia likely vascular dementia takes aspirin and statin.  She uses oxygen at home on/off for the lung condition that was prescribed to her 2 years ago; daughter is unaware of the lung problem.  patient has a home health aid and daughter is daughter is directly involved in patient care.

## 2019-12-13 NOTE — CONSULT NOTE ADULT - ASSESSMENT
89 yo F with PMH colon CA with liver s/p resection 15 years back, dementia, anemia, DLD comes to the ED for inability to ambulate. Hematology/oncology called for anemia.    Normocytic Anemia  -s/p 1U pRBC  -Hb 7.5 on admission 8.1 currently. Hb 10 back in 9/2019 however Hb has been as low as 7 and around 8-9 in the interim.  -get iron studies, B12, folate, MMA  -doubt hemolysis or splenic sequestration  -get peripheral smear  -retic 1.6%  -FOBT  -maintain active T&S  -transfuse as needed    New splenomegaly since 2014 but stable in 2019  -US shows splenomegaly    Hx Colon cancer with liver mets s/p resection and FOLFOX in 2015  -stable    WILL DISCUSS PLAN WITH ATTENDING 88 yo F with PMH colon CA with liver s/p resection 15 years back, dementia, anemia, DLD comes to the ED for inability to ambulate. Hematology/oncology called for anemia.    Normocytic Anemia, suspected myelodysplastic syndrome  -s/p 1U pRBC  -Hb 7.5 on admission 8.1 currently. Hb 10 back in 9/2019 however Hb has been as low as 7 and around 8-9 in the interim.  -get iron studies, B12, folate, MMA, SPEP, UPEP, LDH, haptoglobin  -get Amy test  -send flow cytometry  -peripheral smear shows teardrop cell, mekgakaryocyte fragment  -retic 1.6%  -FOBT  -maintain active T&S  -transfuse as needed    New splenomegaly since 2014 but stable in 2019  -US shows splenomegaly    Hx Colon cancer with liver mets s/p resection and FOLFOX in 2015  -stable    WILL DISCUSS PLAN WITH ATTENDING 89 yo F with PMH colon CA with liver s/p resection 15 years back, dementia, anemia, DLD comes to the ED for inability to ambulate. Hematology/oncology called for anemia.    1. Acute on chronic Normocytic Anemia with splenomegaly- , suspected myelodysplastic syndrome/myeloproliferative disorder  -Hb 7.5 on admission 8.1 currently. Hb 10 back in 9/2019 however Hb has been as low as 7 and around 8-9 in the interim.  LDH-333, low ret count   -peripheral smear shows anisocytosis, poikilocytosis, fragmented RBcs,  teardrop cell, megakaryocyte fragment and immature WBC. No blasts/schistocytes noted   -get iron studies, B12, folate, MMA, SPEP, UPEP,ZOIE, free light chain, haptoglobin, Amy test  -send flow cytometry to r/o MDS/myeloproliferative disease  -FOBT  -transfuse as needed to Keep Hb>7  Will follow up these results and she might need an outpatient BM biopsy     2. Hx Colon cancer with liver met s/p resection and FOLFOX in 2015  -stable    She can follow up as outpt in 2-3 weeks for further evaluation

## 2019-12-13 NOTE — H&P ADULT - ATTENDING COMMENTS
pt seen and examined independently    89 yo F with PMH colon CA with liver s/p resection 15 years back, dementia, anemia, HLD comes to the ED for inability to ambulate for 1 day duration.   Pt does not recall why she is here and there is no family at the bedside.  Pt currently denies any pain, or pain in her back.    #decreased ambulation - likely multifactorial.  infection/anemia/compression fracture.  get PT eval  #possible aspiration PNA - SLP appreciated.  cont dysphagia 3 w/ thins.  aspiration precautions.  continue unasyn for now.  can switch to PO on discharge  #chronic hypoxic resp failure/ILD - continue supplemental O2  #L4 compression fracture - continue conservative mgmt.  NSx appreciated  #anemia - likely AOCD.  s/p 1U PRBC for Hb 7.5.  now 8.1.  monitor H/H.  f/u anemia w/u.  hematology eval pending.  check FOBT  #dementia  #colon ca w/ liver mets   #hx CVA  #PPx - lovenox    Progress Note Handoff  Pending:  PT, hematology  Patient/Family discussion: will need to d/w daughter dispo  Disposition:  STR vs C, d/c planning in 24-48h

## 2019-12-13 NOTE — H&P ADULT - NSHPPHYSICALEXAM_GEN_ALL_CORE
PHYSICAL EXAM:  GENERAL: NAD, well-developed  HEAD:  Atraumatic, Normocephalic  EYES: EOMI, PERRLA, conjunctiva and sclera clear  NECK: Supple, No JVD  CHEST/LUNG: Clear to auscultation bilaterally; No wheeze  HEART: Regular rate and rhythm; No murmurs, rubs, or gallops  ABDOMEN: Soft, Nontender, Nondistended; Bowel sounds present  EXTREMITIES:  2+ Peripheral Pulses, No clubbing, cyanosis, or edema  PSYCH: AAOx3  NEUROLOGY: non-focal  SKIN: No rashes or lesions PHYSICAL EXAM:  GENERAL: NAD, nasal cannula in situ  HEAD:  Atraumatic, Normocephalic  EYES: EOMI, PERRLA, conjunctiva and sclera clear  NECK: Supple, No JVD  CHEST/LUNG: Clear to auscultation bilaterally; No wheeze  HEART: Regular rate and rhythm; No murmurs, rubs, or gallops  ABDOMEN: Soft, Nontender, Nondistended; Bowel sounds present  EXTREMITIES: moves extremities, !+  PSYCH: awake but not alert and only oriented to person and not to time and place  NEUROLOGY: non-focal

## 2019-12-14 LAB
ANION GAP SERPL CALC-SCNC: 14 MMOL/L — SIGNIFICANT CHANGE UP (ref 7–14)
BASOPHILS # BLD AUTO: 0.01 K/UL — SIGNIFICANT CHANGE UP (ref 0–0.2)
BASOPHILS NFR BLD AUTO: 0.2 % — SIGNIFICANT CHANGE UP (ref 0–1)
BUN SERPL-MCNC: 15 MG/DL — SIGNIFICANT CHANGE UP (ref 10–20)
CALCIUM SERPL-MCNC: 8.6 MG/DL — SIGNIFICANT CHANGE UP (ref 8.5–10.1)
CHLORIDE SERPL-SCNC: 104 MMOL/L — SIGNIFICANT CHANGE UP (ref 98–110)
CO2 SERPL-SCNC: 24 MMOL/L — SIGNIFICANT CHANGE UP (ref 17–32)
CREAT SERPL-MCNC: 0.7 MG/DL — SIGNIFICANT CHANGE UP (ref 0.7–1.5)
CULTURE RESULTS: SIGNIFICANT CHANGE UP
DIR ANTIGLOB POLYSPECIFIC INTERPRETATION: SIGNIFICANT CHANGE UP
EOSINOPHIL # BLD AUTO: 0 K/UL — SIGNIFICANT CHANGE UP (ref 0–0.7)
EOSINOPHIL NFR BLD AUTO: 0 % — SIGNIFICANT CHANGE UP (ref 0–8)
GLUCOSE SERPL-MCNC: 100 MG/DL — HIGH (ref 70–99)
HAPTOGLOB SERPL-MCNC: 81 MG/DL — SIGNIFICANT CHANGE UP (ref 34–200)
HCT VFR BLD CALC: 24.3 % — LOW (ref 37–47)
HGB BLD-MCNC: 7.5 G/DL — LOW (ref 12–16)
IMM GRANULOCYTES NFR BLD AUTO: 10 % — HIGH (ref 0.1–0.3)
IRON SATN MFR SERPL: 22 % — SIGNIFICANT CHANGE UP (ref 15–50)
IRON SATN MFR SERPL: 38 UG/DL — SIGNIFICANT CHANGE UP (ref 35–150)
LDH SERPL L TO P-CCNC: 324 — HIGH (ref 50–242)
LYMPHOCYTES # BLD AUTO: 0.25 K/UL — LOW (ref 1.2–3.4)
LYMPHOCYTES # BLD AUTO: 4.8 % — LOW (ref 20.5–51.1)
MAGNESIUM SERPL-MCNC: 1.9 MG/DL — SIGNIFICANT CHANGE UP (ref 1.8–2.4)
MCHC RBC-ENTMCNC: 25.5 PG — LOW (ref 27–31)
MCHC RBC-ENTMCNC: 30.9 G/DL — LOW (ref 32–37)
MCV RBC AUTO: 82.7 FL — SIGNIFICANT CHANGE UP (ref 81–99)
MONOCYTES # BLD AUTO: 0.3 K/UL — SIGNIFICANT CHANGE UP (ref 0.1–0.6)
MONOCYTES NFR BLD AUTO: 5.8 % — SIGNIFICANT CHANGE UP (ref 1.7–9.3)
NEUTROPHILS # BLD AUTO: 4.11 K/UL — SIGNIFICANT CHANGE UP (ref 1.4–6.5)
NEUTROPHILS NFR BLD AUTO: 79.2 % — HIGH (ref 42.2–75.2)
NRBC # BLD: 0 /100 WBCS — SIGNIFICANT CHANGE UP (ref 0–0)
PLATELET # BLD AUTO: 171 K/UL — SIGNIFICANT CHANGE UP (ref 130–400)
POTASSIUM SERPL-MCNC: 4.1 MMOL/L — SIGNIFICANT CHANGE UP (ref 3.5–5)
POTASSIUM SERPL-SCNC: 4.1 MMOL/L — SIGNIFICANT CHANGE UP (ref 3.5–5)
PROT SERPL-MCNC: 6.1 G/DL — SIGNIFICANT CHANGE UP (ref 6–8.3)
PROT SERPL-MCNC: 6.1 G/DL — SIGNIFICANT CHANGE UP (ref 6–8.3)
RBC # BLD: 2.94 M/UL — LOW (ref 4.2–5.4)
RBC # FLD: 21.2 % — HIGH (ref 11.5–14.5)
SODIUM SERPL-SCNC: 142 MMOL/L — SIGNIFICANT CHANGE UP (ref 135–146)
SPECIMEN SOURCE: SIGNIFICANT CHANGE UP
TIBC SERPL-MCNC: 175 UG/DL — LOW (ref 220–430)
UIBC SERPL-MCNC: 137 UG/DL — SIGNIFICANT CHANGE UP (ref 110–370)
WBC # BLD: 5.19 K/UL — SIGNIFICANT CHANGE UP (ref 4.8–10.8)
WBC # FLD AUTO: 5.19 K/UL — SIGNIFICANT CHANGE UP (ref 4.8–10.8)

## 2019-12-14 PROCEDURE — 99233 SBSQ HOSP IP/OBS HIGH 50: CPT

## 2019-12-14 RX ADMIN — ENOXAPARIN SODIUM 40 MILLIGRAM(S): 100 INJECTION SUBCUTANEOUS at 21:57

## 2019-12-14 RX ADMIN — SENNA PLUS 2 TABLET(S): 8.6 TABLET ORAL at 21:57

## 2019-12-14 RX ADMIN — Medication 325 MILLIGRAM(S): at 13:06

## 2019-12-14 RX ADMIN — ATORVASTATIN CALCIUM 40 MILLIGRAM(S): 80 TABLET, FILM COATED ORAL at 21:57

## 2019-12-14 RX ADMIN — AMPICILLIN SODIUM AND SULBACTAM SODIUM 200 GRAM(S): 250; 125 INJECTION, POWDER, FOR SUSPENSION INTRAMUSCULAR; INTRAVENOUS at 05:34

## 2019-12-14 RX ADMIN — AMPICILLIN SODIUM AND SULBACTAM SODIUM 200 GRAM(S): 250; 125 INJECTION, POWDER, FOR SUSPENSION INTRAMUSCULAR; INTRAVENOUS at 17:59

## 2019-12-14 RX ADMIN — Medication 81 MILLIGRAM(S): at 13:06

## 2019-12-14 RX ADMIN — Medication 25 MILLIGRAM(S): at 05:34

## 2019-12-14 RX ADMIN — AMPICILLIN SODIUM AND SULBACTAM SODIUM 200 GRAM(S): 250; 125 INJECTION, POWDER, FOR SUSPENSION INTRAMUSCULAR; INTRAVENOUS at 13:09

## 2019-12-14 RX ADMIN — Medication 1 MILLIGRAM(S): at 13:06

## 2019-12-14 NOTE — PROGRESS NOTE ADULT - SUBJECTIVE AND OBJECTIVE BOX
WILLIAN SETH  89y, Female  Allergy: No Known Allergies    Hospital Day: 1d    Patient seen and examined earlier today.  No new complaints.    PMH/PSH:  PAST MEDICAL & SURGICAL HISTORY:  CVA (cerebral vascular accident)  Anemia  HLD (hyperlipidemia)  Colon cancer metastasized to liver: Underwent surgical resection 15 years ago  History of colon resection      VITALS:  T(F): 95.9 (19 @ 05:13), Max: 96.8 (19 @ 17:07)  HR: 71 (19 @ 05:13)  BP: 125/60 (19 @ 05:13) (125/60 - 145/59)  RR: 18 (19 @ 05:13)  SpO2: 93% (19 @ 22:33)    PHYSICAL EXAM:  GENERAL: NAD  HEENT: MMM  CVS:  RRR  CHEST/LUNG: Good air entry, no wheeze  ABD:  soft, NT/ND +bs  EXTREMITIES:  no edema  NEURO: non-focal    TESTS & MEASUREMENTS:                          7.5    5.19  )-----------( 171      ( 14 Dec 2019 07:57 )             24.3     PT/INR - ( 12 Dec 2019 19:14 )   PT: 14.60 sec;   INR: 1.27 ratio         PTT - ( 12 Dec 2019 19:14 )  PTT:24.3 sec      142  |  104  |  15  ----------------------------<  100<H>  4.1   |  24  |  0.7    Ca    8.6      14 Dec 2019 07:57  Mg     1.9         TPro  6.1  /  Alb  x   /  TBili  x   /  DBili  x   /  AST  x   /  ALT  x   /  AlkPhos  x       LIVER FUNCTIONS - ( 13 Dec 2019 11:07 )  Alb: x     / Pro: 6.1 g/dL / ALK PHOS: x     / ALT: x     / AST: x     / GGT: x           CARDIAC MARKERS ( 12 Dec 2019 19:14 )  x     / <0.01 ng/mL / 18 U/L / x     / <1.0 ng/mL        Culture - Urine (collected 19 @ 18:57)  Source: .Urine Clean Catch (Midstream)  Final Report (19 @ 05:34):    >=3 organisms. Probable collection contamination.      Urinalysis Basic - ( 12 Dec 2019 18:57 )    Color: Light Yellow / Appearance: Clear / S.011 / pH: x  Gluc: x / Ketone: Negative  / Bili: Negative / Urobili: <2 mg/dL   Blood: x / Protein: Negative / Nitrite: Negative   Leuk Esterase: Negative / RBC: x / WBC x   Sq Epi: x / Non Sq Epi: x / Bacteria: x    RADIOLOGY & ADDITIONAL TESTS:  < from: US Spleen (19 @ 08:40) >  Impression  Splenomegaly    < end of copied text >      MEDICATIONS:  MEDICATIONS  (STANDING):  ampicillin/sulbactam  IVPB      ampicillin/sulbactam  IVPB 3 Gram(s) IV Intermittent every 6 hours  aspirin  chewable 81 milliGRAM(s) Oral daily  atorvastatin 40 milliGRAM(s) Oral at bedtime  enoxaparin Injectable 40 milliGRAM(s) SubCutaneous at bedtime  ferrous sulfate Oral Tab/Cap - Peds 325 milliGRAM(s) Oral daily  folic acid 1 milliGRAM(s) Oral daily  metoprolol succinate ER 25 milliGRAM(s) Oral daily  senna 2 Tablet(s) Oral at bedtime    MEDICATIONS  (PRN):      HOME MEDICATIONS:  senna oral tablet ()

## 2019-12-14 NOTE — PROGRESS NOTE ADULT - ASSESSMENT
88 yo F with PMH colon CA with liver s/p resection 15 years back, dementia, anemia, HLD comes to the ED for inability to ambulate for 1 day duration.    #decreased ambulation - likely multifactorial.  infection/anemia/compression fracture.  await PT eval  #possible aspiration PNA - SLP appreciated.  cont dysphagia 3 w/ thins.  aspiration precautions.  continue unasyn for now.  can switch to PO augmentin on discharge  #chronic hypoxic resp failure/ILD - continue supplemental O2  #L4 compression fracture - continue conservative mgmt.  NSx appreciated  #anemia, r/o MDS/myeloprolif disorder -  s/p 1U PRBC for Hb 7.5 --> 8.1 --> 7.5  monitor H/H. B12 was borderline low in sept, will recheck.  hematology eval appreciated; can do BM Bx as outpatient  #dementia  #colon ca w/ liver mets s/p resection and chemo  #hx CVA  #PPx - lovenox    Progress Note Handoff  Pending:  PT evaluation  Patient/Family discussion: will need to d/w daughter dispo  Disposition:  STR vs C, d/c planning in 24-48h .     I was physically present for the key portions of the evaluation and management (E/M) service provided.  I agree with the above history, physical, and plan which I have reviewed and edited where appropriate.     35 minutes spent on total encounter; more than 50% of the visit was spent counseling and/or coordinating care by the attending physician.     Plan discussed with resident.

## 2019-12-15 LAB
BASOPHILS # BLD AUTO: 0.01 K/UL — SIGNIFICANT CHANGE UP (ref 0–0.2)
BASOPHILS NFR BLD AUTO: 0.2 % — SIGNIFICANT CHANGE UP (ref 0–1)
EOSINOPHIL # BLD AUTO: 0.02 K/UL — SIGNIFICANT CHANGE UP (ref 0–0.7)
EOSINOPHIL NFR BLD AUTO: 0.4 % — SIGNIFICANT CHANGE UP (ref 0–8)
FERRITIN SERPL-MCNC: 821 NG/ML — HIGH (ref 15–150)
FOLATE SERPL-MCNC: >20 NG/ML — SIGNIFICANT CHANGE UP
HAPTOGLOB SERPL-MCNC: 106 MG/DL — SIGNIFICANT CHANGE UP (ref 34–200)
HCT VFR BLD CALC: 25.4 % — LOW (ref 37–47)
HGB BLD-MCNC: 7.6 G/DL — LOW (ref 12–16)
IMM GRANULOCYTES NFR BLD AUTO: 10.4 % — HIGH (ref 0.1–0.3)
IRON SATN MFR SERPL: 59 UG/DL — SIGNIFICANT CHANGE UP (ref 35–150)
LYMPHOCYTES # BLD AUTO: 0.3 K/UL — LOW (ref 1.2–3.4)
LYMPHOCYTES # BLD AUTO: 6.6 % — LOW (ref 20.5–51.1)
MCHC RBC-ENTMCNC: 25.1 PG — LOW (ref 27–31)
MCHC RBC-ENTMCNC: 29.9 G/DL — LOW (ref 32–37)
MCV RBC AUTO: 83.8 FL — SIGNIFICANT CHANGE UP (ref 81–99)
MONOCYTES # BLD AUTO: 0.22 K/UL — SIGNIFICANT CHANGE UP (ref 0.1–0.6)
MONOCYTES NFR BLD AUTO: 4.9 % — SIGNIFICANT CHANGE UP (ref 1.7–9.3)
NEUTROPHILS # BLD AUTO: 3.5 K/UL — SIGNIFICANT CHANGE UP (ref 1.4–6.5)
NEUTROPHILS NFR BLD AUTO: 77.5 % — HIGH (ref 42.2–75.2)
NRBC # BLD: 0 /100 WBCS — SIGNIFICANT CHANGE UP (ref 0–0)
PLATELET # BLD AUTO: 171 K/UL — SIGNIFICANT CHANGE UP (ref 130–400)
PROT SERPL-MCNC: 5.6 G/DL — LOW (ref 6–8.3)
PROT SERPL-MCNC: 5.6 G/DL — LOW (ref 6–8.3)
RBC # BLD: 3.03 M/UL — LOW (ref 4.2–5.4)
RBC # FLD: 20.8 % — HIGH (ref 11.5–14.5)
VIT B12 SERPL-MCNC: 452 PG/ML — SIGNIFICANT CHANGE UP (ref 232–1245)
WBC # BLD: 4.52 K/UL — LOW (ref 4.8–10.8)
WBC # FLD AUTO: 4.52 K/UL — LOW (ref 4.8–10.8)

## 2019-12-15 PROCEDURE — 99232 SBSQ HOSP IP/OBS MODERATE 35: CPT

## 2019-12-15 RX ORDER — POLYETHYLENE GLYCOL 3350 17 G/17G
17 POWDER, FOR SOLUTION ORAL
Refills: 0 | Status: COMPLETED | OUTPATIENT
Start: 2019-12-15 | End: 2019-12-16

## 2019-12-15 RX ADMIN — AMPICILLIN SODIUM AND SULBACTAM SODIUM 200 GRAM(S): 250; 125 INJECTION, POWDER, FOR SUSPENSION INTRAMUSCULAR; INTRAVENOUS at 05:22

## 2019-12-15 RX ADMIN — ENOXAPARIN SODIUM 40 MILLIGRAM(S): 100 INJECTION SUBCUTANEOUS at 21:41

## 2019-12-15 RX ADMIN — SENNA PLUS 2 TABLET(S): 8.6 TABLET ORAL at 21:41

## 2019-12-15 RX ADMIN — AMPICILLIN SODIUM AND SULBACTAM SODIUM 200 GRAM(S): 250; 125 INJECTION, POWDER, FOR SUSPENSION INTRAMUSCULAR; INTRAVENOUS at 13:02

## 2019-12-15 RX ADMIN — Medication 325 MILLIGRAM(S): at 13:02

## 2019-12-15 RX ADMIN — AMPICILLIN SODIUM AND SULBACTAM SODIUM 200 GRAM(S): 250; 125 INJECTION, POWDER, FOR SUSPENSION INTRAMUSCULAR; INTRAVENOUS at 17:46

## 2019-12-15 RX ADMIN — ATORVASTATIN CALCIUM 40 MILLIGRAM(S): 80 TABLET, FILM COATED ORAL at 21:41

## 2019-12-15 RX ADMIN — Medication 81 MILLIGRAM(S): at 13:02

## 2019-12-15 RX ADMIN — AMPICILLIN SODIUM AND SULBACTAM SODIUM 200 GRAM(S): 250; 125 INJECTION, POWDER, FOR SUSPENSION INTRAMUSCULAR; INTRAVENOUS at 00:09

## 2019-12-15 RX ADMIN — Medication 1 MILLIGRAM(S): at 13:02

## 2019-12-15 RX ADMIN — Medication 25 MILLIGRAM(S): at 05:22

## 2019-12-15 NOTE — PROGRESS NOTE ADULT - SUBJECTIVE AND OBJECTIVE BOX
*This is a Preliminary Note and will be edited*  SUBJECTIVE:  Pt is a 89 yo F with PMH colon CA with liver s/p resection 15 years back, dementia, anemia, HLD comes to the ED for inability to ambulate for 1 day duration. (14 Dec 2019 14:24)     Currently admitted to medicine with the primary diagnosis of Anemia     Today is hospital day 2d. This morning she is resting comfortably in bed and reports no new** issues or overnight events.   Pt. denies HA, Cp, Abd Pain or discomfort.**  Pt. is urinating and stooling appropriately.**    PAST MEDICAL & SURGICAL HISTORY  CVA (cerebral vascular accident)  Anemia  HLD (hyperlipidemia)  Colon cancer metastasized to liver: Underwent surgical resection 15 years ago  History of colon resection    SOCIAL HISTORY:  Negative for smoking/alcohol/drug use.     ALLERGIES:  No Known Allergies    MEDICATIONS:  STANDING MEDICATIONS  ampicillin/sulbactam  IVPB      ampicillin/sulbactam  IVPB 3 Gram(s) IV Intermittent every 6 hours  aspirin  chewable 81 milliGRAM(s) Oral daily  atorvastatin 40 milliGRAM(s) Oral at bedtime  enoxaparin Injectable 40 milliGRAM(s) SubCutaneous at bedtime  ferrous sulfate Oral Tab/Cap - Peds 325 milliGRAM(s) Oral daily  folic acid 1 milliGRAM(s) Oral daily  metoprolol succinate ER 25 milliGRAM(s) Oral daily  senna 2 Tablet(s) Oral at bedtime    PRN MEDICATIONS    VITALS:   T(F): 97.9  HR: 65 (65 - 71)  BP: 143/64 (136/63 - 143/64)  RR: 18 (18 - 19)  SpO2: 96% (96% - 96%)    LABS:                        7.5    5.19  )-----------( 171      ( 14 Dec 2019 07:57 )             24.3     12-14    142  |  104  |  15  ----------------------------<  100<H>  4.1   |  24  |  0.7    Ca    8.6      14 Dec 2019 07:57  Mg     1.9     12-14    TPro  6.1  /  Alb  x   /  TBili  x   /  DBili  x   /  AST  x   /  ALT  x   /  AlkPhos  x   12-13              Culture - Blood (collected 13 Dec 2019 11:07)  Source: .Blood None  Preliminary Report (15 Dec 2019 02:13):    No growth to date.    Culture - Urine (collected 12 Dec 2019 18:57)  Source: .Urine Clean Catch (Midstream)  Final Report (14 Dec 2019 05:34):    >=3 organisms. Probable collection contamination.          Troponin T, Serum: <0.01 ng/mL (12-12-19 @ 19:14)  Serum Pro-Brain Natriuretic Peptide: 879 pg/mL (12-12-19 @ 19:14)      RADIOLOGY:    PHYSICAL EXAM:  GEN: In no acute distress. Pt. is awake in bed able to have a conversation.  LUNGS: CTABL, Symmetrical inspiration, no increased work of breathing  HEART: +S1,S2, RRR, No murmurs, Rubs, Gallops   ABD: Bowel Sounds Present, Soft, non tender, non distended, no guarding, no rebound.   EXT: 2+ peripheral Pulses, no clubbing, no cyanosis, no Edema.   NEURO: AAOX3. No focal deficits. CN 2-12 Grossly intact. SUBJECTIVE:  Pt is a 89 yo F with PMH colon CA with liver s/p resection 15 years back, dementia, anemia, HLD comes to the ED for inability to ambulate for 1 day duration. (14 Dec 2019 14:24)     Currently admitted to medicine with the primary diagnosis of Anemia     Today is hospital day 2d. This morning she is resting comfortably in bed and reports no new issues or overnight events. Feels well overall.   Pt. denies HA, Cp, Abd Pain or discomfort.  Pt. is urinating  reports slightly constipated.    PAST MEDICAL & SURGICAL HISTORY  CVA (cerebral vascular accident)  Anemia  HLD (hyperlipidemia)  Colon cancer metastasized to liver: Underwent surgical resection 15 years ago  History of colon resection    SOCIAL HISTORY:  Negative for smoking/alcohol/drug use.     ALLERGIES:  No Known Allergies    MEDICATIONS:  STANDING MEDICATIONS  ampicillin/sulbactam  IVPB      ampicillin/sulbactam  IVPB 3 Gram(s) IV Intermittent every 6 hours  aspirin  chewable 81 milliGRAM(s) Oral daily  atorvastatin 40 milliGRAM(s) Oral at bedtime  enoxaparin Injectable 40 milliGRAM(s) SubCutaneous at bedtime  ferrous sulfate Oral Tab/Cap - Peds 325 milliGRAM(s) Oral daily  folic acid 1 milliGRAM(s) Oral daily  metoprolol succinate ER 25 milliGRAM(s) Oral daily  senna 2 Tablet(s) Oral at bedtime    PRN MEDICATIONS    VITALS:   T(F): 97.9  HR: 65 (65 - 71)  BP: 143/64 (136/63 - 143/64)  RR: 18 (18 - 19)  SpO2: 96% (96% - 96%)    LABS:                        7.5    5.19  )-----------( 171      ( 14 Dec 2019 07:57 )             24.3     12-14    142  |  104  |  15  ----------------------------<  100<H>  4.1   |  24  |  0.7    Ca    8.6      14 Dec 2019 07:57  Mg     1.9     12-14    TPro  6.1  /  Alb  x   /  TBili  x   /  DBili  x   /  AST  x   /  ALT  x   /  AlkPhos  x   12-13              Culture - Blood (collected 13 Dec 2019 11:07)  Source: .Blood None  Preliminary Report (15 Dec 2019 02:13):    No growth to date.    Culture - Urine (collected 12 Dec 2019 18:57)  Source: .Urine Clean Catch (Midstream)  Final Report (14 Dec 2019 05:34):    >=3 organisms. Probable collection contamination.    Troponin T, Serum: <0.01 ng/mL (12-12-19 @ 19:14)  Serum Pro-Brain Natriuretic Peptide: 879 pg/mL (12-12-19 @ 19:14)      RADIOLOGY:    PHYSICAL EXAM:  GEN: In no acute distress. Pt. is awake in bed able to have a conversation.  LUNGS: CTABL, Symmetrical inspiration, no increased work of breathing  HEART: +S1,S2, RRR, No murmurs apprecaited  ABD: Bowel Sounds Present, Soft, non tender, non distended, no guarding, no rebound.   EXT: 2+ peripheral Pulses, no clubbing, no cyanosis, no Edema. Able to Flex Bl LE and wiggle toes. Strength UE within normal limits.   NEURO: AAOX2(not to date). No focal deficits. CN 2-12 Grossly intact.

## 2019-12-15 NOTE — PROGRESS NOTE ADULT - ATTENDING COMMENTS
pt seen and examined independently    88 yo F with PMH colon CA with liver s/p resection 15 years back, dementia, anemia, HLD comes to the ED for inability to ambulate for 1 day duration.     #decreased ambulation - likely multifactorial.  infection/anemia/compression fracture.  still waiting for PT eval  #possible aspiration PNA - SLP appreciated.  cont dysphagia 3 w/ thins.  aspiration precautions.  continue unasyn for now.  can switch to PO augmentin on discharge  #chronic hypoxic resp failure/ILD - continue supplemental O2  #L4 compression fracture - continue conservative mgmt.  NSx appreciated  #anemia - r/o MDS/myeloprolif disorder -  s/p 1U PRBC for Hb 7.5 --> 8.1 --> 7.5 --> 7.6  monitor H/H. B12, folate ok, no iron def.  hematology eval appreciated; can do BM Bx as outpatient  #dementia  #colon ca w/ liver mets s/p resection and chemo  #hx CVA  #PPx - lovenox    Progress Note Handoff  Pending:  PT  Patient/Family discussion:  will need to d/w daughter  Disposition: STR

## 2019-12-15 NOTE — PROGRESS NOTE ADULT - ASSESSMENT
Inability to Ambulate:  likely combination of worsening functional status/ progression of dementia/ Anemia/Vertebral compression fracture  no focal deficit  CT head and CT lumbar spine done  L4 compression fracture of spine  Likely conservative management  neurosurgery consult  Pt/Rehab    Anemia:  anemia of chronic disease  s/p one unit PRBC by ED as ED was told by the daughter that she gets these weakness when more anemia and gets better with transfusion.  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 vascular dementia  progressing  continue aspirin and statin    Interstitial Lung disease:  diagnosed in 2017 (refer one content)  was discharged on home o2  patient did not follow up with pulmonary   on/off oxygen use at home    Colon cancer with liver mets:  s/p resection 15 years ago  in remission    H/o Ischemic CVA with left sided weakness in september  no focal deficit now  cw aspirin and statin    Diet: dysphagia 3          speech and swallow    DVT ppx: lovenox    DNR/DNI; daughter is health care proxy; she will come during the day and complete the molst form           Attending Statement:  pt seen and examined independently    88 yo F with PMH colon CA with liver s/p resection 15 years back, dementia, anemia, HLD comes to the ED for inability to ambulate for 1 day duration.   Pt does not recall why she is here and there is no family at the bedside.  Pt currently denies any pain, or pain in her back.    #decreased ambulation - likely multifactorial.  infection/anemia/compression fracture.  get PT eval  #possible aspiration PNA - SLP appreciated.  cont dysphagia 3 w/ thins.  aspiration precautions.  continue unasyn for now.  can switch to PO on discharge  #chronic hypoxic resp failure/ILD - continue supplemental O2  #L4 compression fracture - continue conservative mgmt.  NSx appreciated  #anemia - likely AOCD.  s/p 1U PRBC for Hb 7.5.  now 8.1.  monitor H/H.  f/u anemia w/u.  hematology eval pending.  check FOBT  #dementia  #colon ca w/ liver mets   #hx CVA  #PPx - lovenox Inability to Ambulate:  likely combination of worsening functional status/ progression of dementia/ Anemia/Vertebral compression fracture  no focal deficit  CT head and CT lumbar spine done  L4 compression fracture of spine  Likely conservative management  neurosurgery consult  Pt/Rehab    Anemia:  anemia of chronic disease  s/p one unit PRBC by ED as ED was told by the daughter that she gets these weakness when more anemia and gets better with transfusion.  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 vascular dementia  progressing  continue aspirin and statin    Interstitial Lung disease:  diagnosed in 2017 (refer one content)  was discharged on home o2  patient did not follow up with pulmonary   on/off oxygen use at home    Colon cancer with liver mets:  s/p resection 15 years ago  in remission    H/o Ischemic CVA with left sided weakness in september  no focal deficit now  cw aspirin and statin    Diet: dysphagia 3          speech and swallow    DVT ppx: lovenox    DNR/DNI; daughter is health care proxy; she will come during the day and complete the molst form Pt is a 89 yo F with PMH colon CA with liver s/p resection 15 years back, dementia, anemia, HLD comes to the ED for inability to ambulate for 1 day duration.    S/P 1 unit PRBC in Ed    # Inability to Ambulate:  likely combination of worsening functional status/ progression of dementia/ Anemia/Vertebral compression fracture  no focal deficit  CT head and CT lumbar spine done  L4 compression fracture of spine  Likely conservative management  neurosurgery consult  Pt/Rehab    # Anemia: - Macrocytic  anemia of chronic disease  s/p one unit PRBC by ED as ED was told by the daughter that she gets these weakness when more anemia and gets better with transfusion.  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 vascular dementia  progressing  continue aspirin and statin    # Interstitial Lung disease:  diagnosed in 2017 (refer one content)  was discharged on home o2  patient did not follow up with pulmonary   on/off oxygen use at home    # Colon cancer with liver mets:  s/p resection 15 years ago  in remission    # H/o Ischemic CVA with left sided weakness in september  no focal deficit now  Continue with aspirin and statin    Diet: dysphagia 3          speech and swallow    DVT ppx: lovenox    DNR/DNI; daughter is health care proxy; she will come during the day and complete the molst form    HANDOFF:  - PT / CM/ SW for Placement  - Monitor Hemoglobin - Transfuse < 7  - Follow up Heme/Onc Out patient for MDS/Myeloproliferative Disorder

## 2019-12-16 ENCOUNTER — TRANSCRIPTION ENCOUNTER (OUTPATIENT)
Age: 84
End: 2019-12-16

## 2019-12-16 PROBLEM — I63.9 CEREBRAL INFARCTION, UNSPECIFIED: Chronic | Status: ACTIVE | Noted: 2019-12-12

## 2019-12-16 PROBLEM — D64.9 ANEMIA, UNSPECIFIED: Chronic | Status: ACTIVE | Noted: 2019-12-12

## 2019-12-16 PROBLEM — E78.5 HYPERLIPIDEMIA, UNSPECIFIED: Chronic | Status: ACTIVE | Noted: 2019-12-12

## 2019-12-16 LAB
% ALBUMIN: 54.1 % — SIGNIFICANT CHANGE UP
% ALBUMIN: 54.7 % — SIGNIFICANT CHANGE UP
% ALPHA 1: 6.1 % — SIGNIFICANT CHANGE UP
% ALPHA 1: 6.7 % — SIGNIFICANT CHANGE UP
% ALPHA 2: 10 % — SIGNIFICANT CHANGE UP
% ALPHA 2: 10.5 % — SIGNIFICANT CHANGE UP
% BETA: 11.6 % — SIGNIFICANT CHANGE UP
% BETA: 11.9 % — SIGNIFICANT CHANGE UP
% GAMMA: 17.1 % — SIGNIFICANT CHANGE UP
% GAMMA: 17.3 % — SIGNIFICANT CHANGE UP
ACANTHOCYTES BLD QL SMEAR: SIGNIFICANT CHANGE UP
ALBUMIN SERPL ELPH-MCNC: 3 G/DL — LOW (ref 3.6–5.5)
ALBUMIN SERPL ELPH-MCNC: 3.3 G/DL — LOW (ref 3.5–5.2)
ALBUMIN SERPL ELPH-MCNC: 3.3 G/DL — LOW (ref 3.6–5.5)
ALBUMIN/GLOB SERPL ELPH: 1.2 RATIO — SIGNIFICANT CHANGE UP
ALBUMIN/GLOB SERPL ELPH: 1.2 RATIO — SIGNIFICANT CHANGE UP
ALP SERPL-CCNC: 52 U/L — SIGNIFICANT CHANGE UP (ref 30–115)
ALPHA1 GLOB SERPL ELPH-MCNC: 0.4 G/DL — SIGNIFICANT CHANGE UP (ref 0.1–0.4)
ALPHA1 GLOB SERPL ELPH-MCNC: 0.4 G/DL — SIGNIFICANT CHANGE UP (ref 0.1–0.4)
ALPHA2 GLOB SERPL ELPH-MCNC: 0.6 G/DL — SIGNIFICANT CHANGE UP (ref 0.5–1)
ALPHA2 GLOB SERPL ELPH-MCNC: 0.6 G/DL — SIGNIFICANT CHANGE UP (ref 0.5–1)
ALT FLD-CCNC: 8 U/L — SIGNIFICANT CHANGE UP (ref 0–41)
ANION GAP SERPL CALC-SCNC: 13 MMOL/L — SIGNIFICANT CHANGE UP (ref 7–14)
ANISOCYTOSIS BLD QL: SIGNIFICANT CHANGE UP
AST SERPL-CCNC: 14 U/L — SIGNIFICANT CHANGE UP (ref 0–41)
B-GLOBULIN SERPL ELPH-MCNC: 0.6 G/DL — SIGNIFICANT CHANGE UP (ref 0.5–1)
B-GLOBULIN SERPL ELPH-MCNC: 0.7 G/DL — SIGNIFICANT CHANGE UP (ref 0.5–1)
BASOPHILS # BLD AUTO: 0 K/UL — SIGNIFICANT CHANGE UP (ref 0–0.2)
BASOPHILS NFR BLD AUTO: 0 % — SIGNIFICANT CHANGE UP (ref 0–1)
BILIRUB SERPL-MCNC: 0.4 MG/DL — SIGNIFICANT CHANGE UP (ref 0.2–1.2)
BLASTS # FLD: 3.5 % — CRITICAL HIGH (ref 0–0)
BLD GP AB SCN SERPL QL: SIGNIFICANT CHANGE UP
BUN SERPL-MCNC: 11 MG/DL — SIGNIFICANT CHANGE UP (ref 10–20)
CALCIUM SERPL-MCNC: 8.6 MG/DL — SIGNIFICANT CHANGE UP (ref 8.5–10.1)
CHLORIDE SERPL-SCNC: 104 MMOL/L — SIGNIFICANT CHANGE UP (ref 98–110)
CO2 SERPL-SCNC: 24 MMOL/L — SIGNIFICANT CHANGE UP (ref 17–32)
CREAT SERPL-MCNC: 0.7 MG/DL — SIGNIFICANT CHANGE UP (ref 0.7–1.5)
DACRYOCYTES BLD QL SMEAR: SLIGHT — SIGNIFICANT CHANGE UP
EOSINOPHIL # BLD AUTO: 0 K/UL — SIGNIFICANT CHANGE UP (ref 0–0.7)
EOSINOPHIL NFR BLD AUTO: 0 % — SIGNIFICANT CHANGE UP (ref 0–8)
FERRITIN SERPL-MCNC: 836 NG/ML — HIGH (ref 15–150)
FOLATE SERPL-MCNC: >20 NG/ML — SIGNIFICANT CHANGE UP
GAMMA GLOBULIN: 1 G/DL — SIGNIFICANT CHANGE UP (ref 0.6–1.6)
GAMMA GLOBULIN: 1.1 G/DL — SIGNIFICANT CHANGE UP (ref 0.6–1.6)
GIANT PLATELETS BLD QL SMEAR: PRESENT — SIGNIFICANT CHANGE UP
GLUCOSE SERPL-MCNC: 97 MG/DL — SIGNIFICANT CHANGE UP (ref 70–99)
HCT VFR BLD CALC: 25.6 % — LOW (ref 37–47)
HGB BLD-MCNC: 7.9 G/DL — LOW (ref 12–16)
INTERPRETATION SERPL IFE-IMP: SIGNIFICANT CHANGE UP
KAPPA LC SER QL IFE: 3.73 MG/DL — HIGH (ref 0.33–1.94)
KAPPA/LAMBDA FREE LIGHT CHAIN RATIO, SERUM: 1.11 RATIO — SIGNIFICANT CHANGE UP (ref 0.26–1.65)
LAMBDA LC SER QL IFE: 3.35 MG/DL — HIGH (ref 0.57–2.63)
LYMPHOCYTES # BLD AUTO: 0.19 K/UL — LOW (ref 1.2–3.4)
LYMPHOCYTES # BLD AUTO: 4.4 % — LOW (ref 20.5–51.1)
MANUAL SMEAR VERIFICATION: SIGNIFICANT CHANGE UP
MCHC RBC-ENTMCNC: 25.7 PG — LOW (ref 27–31)
MCHC RBC-ENTMCNC: 30.9 G/DL — LOW (ref 32–37)
MCV RBC AUTO: 83.4 FL — SIGNIFICANT CHANGE UP (ref 81–99)
METAMYELOCYTES # FLD: 4.4 % — HIGH (ref 0–0)
MICROCYTES BLD QL: SIGNIFICANT CHANGE UP
MONOCYTES # BLD AUTO: 0.04 K/UL — LOW (ref 0.1–0.6)
MONOCYTES NFR BLD AUTO: 0.9 % — LOW (ref 1.7–9.3)
MYELOCYTES NFR BLD: 5.3 % — HIGH (ref 0–0)
NEUTROPHILS # BLD AUTO: 3.28 K/UL — SIGNIFICANT CHANGE UP (ref 1.4–6.5)
NEUTROPHILS NFR BLD AUTO: 74.5 % — SIGNIFICANT CHANGE UP (ref 42.2–75.2)
NEUTS BAND # BLD: 0.9 % — SIGNIFICANT CHANGE UP (ref 0–6)
NRBC # BLD: 2 /100 — HIGH (ref 0–0)
NRBC # BLD: SIGNIFICANT CHANGE UP /100 WBCS (ref 0–0)
OVALOCYTES BLD QL SMEAR: SIGNIFICANT CHANGE UP
PLAT MORPH BLD: ABNORMAL
PLATELET # BLD AUTO: 142 K/UL — SIGNIFICANT CHANGE UP (ref 130–400)
POIKILOCYTOSIS BLD QL AUTO: SIGNIFICANT CHANGE UP
POLYCHROMASIA BLD QL SMEAR: SLIGHT — SIGNIFICANT CHANGE UP
POTASSIUM SERPL-MCNC: 4.4 MMOL/L — SIGNIFICANT CHANGE UP (ref 3.5–5)
POTASSIUM SERPL-SCNC: 4.4 MMOL/L — SIGNIFICANT CHANGE UP (ref 3.5–5)
PROMYELOCYTES # FLD: 1.7 % — HIGH (ref 0–0)
PROT PATTERN SERPL ELPH-IMP: SIGNIFICANT CHANGE UP
PROT PATTERN SERPL ELPH-IMP: SIGNIFICANT CHANGE UP
PROT SERPL-MCNC: 5.6 G/DL — LOW (ref 6–8)
RBC # BLD: 3.07 M/UL — LOW (ref 4.2–5.4)
RBC # FLD: 20.8 % — HIGH (ref 11.5–14.5)
RBC BLD AUTO: ABNORMAL
SCHISTOCYTES BLD QL AUTO: SLIGHT — SIGNIFICANT CHANGE UP
SMUDGE CELLS # BLD: PRESENT — SIGNIFICANT CHANGE UP
SODIUM SERPL-SCNC: 141 MMOL/L — SIGNIFICANT CHANGE UP (ref 135–146)
VARIANT LYMPHS # BLD: 4.4 % — SIGNIFICANT CHANGE UP (ref 0–5)
VIT B12 SERPL-MCNC: 466 PG/ML — SIGNIFICANT CHANGE UP (ref 232–1245)
WBC # BLD: 4.35 K/UL — LOW (ref 4.8–10.8)
WBC # FLD AUTO: 4.35 K/UL — LOW (ref 4.8–10.8)

## 2019-12-16 PROCEDURE — 99232 SBSQ HOSP IP/OBS MODERATE 35: CPT

## 2019-12-16 RX ADMIN — AMPICILLIN SODIUM AND SULBACTAM SODIUM 200 GRAM(S): 250; 125 INJECTION, POWDER, FOR SUSPENSION INTRAMUSCULAR; INTRAVENOUS at 05:13

## 2019-12-16 RX ADMIN — Medication 25 MILLIGRAM(S): at 05:13

## 2019-12-16 RX ADMIN — Medication 325 MILLIGRAM(S): at 12:42

## 2019-12-16 RX ADMIN — SENNA PLUS 2 TABLET(S): 8.6 TABLET ORAL at 22:24

## 2019-12-16 RX ADMIN — ATORVASTATIN CALCIUM 40 MILLIGRAM(S): 80 TABLET, FILM COATED ORAL at 22:24

## 2019-12-16 RX ADMIN — POLYETHYLENE GLYCOL 3350 17 GRAM(S): 17 POWDER, FOR SOLUTION ORAL at 05:14

## 2019-12-16 RX ADMIN — ENOXAPARIN SODIUM 40 MILLIGRAM(S): 100 INJECTION SUBCUTANEOUS at 22:24

## 2019-12-16 RX ADMIN — Medication 81 MILLIGRAM(S): at 12:42

## 2019-12-16 RX ADMIN — AMPICILLIN SODIUM AND SULBACTAM SODIUM 200 GRAM(S): 250; 125 INJECTION, POWDER, FOR SUSPENSION INTRAMUSCULAR; INTRAVENOUS at 00:22

## 2019-12-16 RX ADMIN — Medication 1 MILLIGRAM(S): at 12:41

## 2019-12-16 NOTE — PROGRESS NOTE ADULT - SUBJECTIVE AND OBJECTIVE BOX
LENGTH OF HOSPITAL STAY: 3d    CHIEF COMPLAINT:   Patient is a 89y old  Female who presents with a chief complaint of inability to ambulate (16 Dec 2019 12:37)      HISTORY OF PRESENTING ILLNESS:    HPI:  89 yo F with PMH colon CA with liver s/p resection 15 years back, dementia, anemia, HLD comes to the ED for inability to ambulate for 1 day duration.  Patient needs help in her daily activities but was able to walk a little with walker but yesterday she felt too weak and was unable to walk.  She does not complain of pain anywhere in the body or any fall or trauma.  No SOB, chest pain, abd pain, dysuria, headache, or back pain.  She was admitted in september for pontine stroke with left sided weakness discharged on home physical therapy.  She has progressive dementia likely vascular dementia takes aspirin and statin.  She uses oxygen at home on/off for the lung condition that was prescribed to her 2 years ago; daughter is unaware of the lung problem.  patient has a home health aid and daughter is daughter is directly involved in patient care. (13 Dec 2019 03:50)    PAST MEDICAL & SURGICAL HISTORY  PAST MEDICAL & SURGICAL HISTORY:  CVA (cerebral vascular accident)  Anemia  HLD (hyperlipidemia)  Colon cancer metastasized to liver: Underwent surgical resection 15 years ago  History of colon resection    SOCIAL HISTORY:    ALLERGIES:  No Known Allergies    MEDICATIONS:  STANDING MEDICATIONS  aspirin  chewable 81 milliGRAM(s) Oral daily  atorvastatin 40 milliGRAM(s) Oral at bedtime  enoxaparin Injectable 40 milliGRAM(s) SubCutaneous at bedtime  ferrous sulfate Oral Tab/Cap - Peds 325 milliGRAM(s) Oral daily  folic acid 1 milliGRAM(s) Oral daily  metoprolol succinate ER 25 milliGRAM(s) Oral daily  polyethylene glycol 3350 17 Gram(s) Oral two times a day  senna 2 Tablet(s) Oral at bedtime    PRN MEDICATIONS    VITALS:   T(F): 96.3  HR: 64  BP: 140/76  RR: 18  SpO2: 95%    LABS:                        7.9    4.35  )-----------( 142      ( 16 Dec 2019 06:49 )             25.6     12-16    141  |  104  |  11  ----------------------------<  97  4.4   |  24  |  0.7    Ca    8.6      16 Dec 2019 06:49    TPro  5.6<L>  /  Alb  3.3<L>  /  TBili  0.4  /  DBili  x   /  AST  14  /  ALT  8   /  AlkPhos  52  12-16                  RADIOLOGY:  < from: CT Lumbar Spine No Cont (12.12.19 @ 20:48) >  IMPRESSION:    Moderate compression deformity of the L4 vertebral body,   age-indeterminate and new since 1/21/2014.    Multilevel degenerative changes as detailed above.    Additional findings: On coronal imaging note is made of an enlarged   spleen measuring at least 14 cm, partially imaged, new from 2014   examination      < end of copied text >    PHYSICAL EXAM:  GEN: In no acute distress. Pt. is awake in bed able to have a conversation.  LUNGS: CTABL, Symmetrical inspiration, no increased work of breathing  HEART: +S1,S2, RRR, No murmurs apprecaited  ABD: Bowel Sounds Present, Soft, non tender, non distended, no guarding, no rebound.   EXT: 2+ peripheral Pulses, no clubbing, no cyanosis, no Edema. Able to Flex Bl LE and wiggle toes. Strength UE within normal limits.   NEURO: AAOX2(not to date). No focal deficits. CN 2-12 Grossly intact.

## 2019-12-16 NOTE — PROGRESS NOTE ADULT - ASSESSMENT
88 yo F with PMH colon CA with liver s/p resection 15 years back, dementia, anemia, DLD comes to the ED for inability to ambulate. Hematology/oncology called for anemia.    1. Acute on chronic Normocytic Anemia with splenomegaly- with blasts like cells on peripheral smear : Suspect primary Bone marrow etiology  LDH-333, low ret count   -peripheral smear reviewed today- shows anisocytosis, poikilocytosis, fragmented RBcs,  teardrop cell, megakaryocyte fragment and immature WBC. Few blasts like cells were noted   Normal iron studies with ferritin- 800, normal B12, folate, haptoglobin  Negative sidra , SPEP- normal, k/l ratio-normal . f/u ZOIE  f/u flow cytometry- sent and pending   -transfuse as needed to Keep Hb>7  Will plan for BM biopsy and aspiration tomorrow if patient agrees       2. Hx Colon cancer with liver met s/p resection and FOLFOX in 2015  -stable    Outpt appt made with Dr Brannon- 1/6/20 at 5pm    Discussed with Dr Brannon 88 yo F with PMH colon CA with liver s/p resection 15 years back, dementia, anemia, DLD comes to the ED for inability to ambulate. Hematology/oncology called for anemia.    1. Acute on chronic Normocytic Anemia with splenomegaly- with blasts like cells on peripheral smear : Suspect primary Bone marrow etiology  LDH-333, low ret count   -peripheral smear reviewed today- shows anisocytosis, poikilocytosis, fragmented RBcs,  teardrop cell, megakaryocyte fragment and immature WBC. Few blasts like cells were noted   Normal iron studies with ferritin- 800, normal B12, folate, haptoglobin  Negative sidra , SPEP- normal, k/l ratio-normal . f/u ZOIE  f/u flow cytometry- sent and pending   -transfuse as needed to Keep Hb>7  Will plan for BM biopsy and aspiration tomorrow (spoke to patient and daughter)- They agree    2. Hx Colon cancer with liver met s/p resection and FOLFOX in 2015  -stable    Outpt appt made with Dr Brannon- 1/6/20 at 5pm    Discussed with Dr Brannon

## 2019-12-16 NOTE — PROGRESS NOTE ADULT - SUBJECTIVE AND OBJECTIVE BOX
WILLIAN SETH  89y  Female  ***My note supersedes ALL resident notes that I sign.  My corrections for their notes are in my note.***    I can be reached directly on [employer]. My office number is [phone]. My personal cell number is [phone].    INTERVAL EVENTS: Here for f/u of weakness. Pt is confused, but pleasant (this is baseline).  She had no particular needs and said that she felt OK.    T(F): 96.5 (12-16-19 @ 14:13), Max: 98.5 (12-15-19 @ 20:12)  HR: 64 (12-16-19 @ 14:13) (63 - 64)  BP: 134/63 (12-16-19 @ 14:13) (134/63 - 140/76)  RR: 18 (12-16-19 @ 14:13) (18 - 18)  SpO2: --    Gen: NAD  HEENT: WNL; except conj pale  Neck: no JVD, no nodes; thyroid nl  lungs: clr  Hrt: s1 s2 rrr  abd: soft, NT/ND  ext: no edema, no c/c    LABS:                        7.9     (    83.4   4.35  )-----------( ---------      142      ( 16 Dec 2019 06:49 )             25.6    (    20.8     has metamyelo, myelo, promyelo and blasts on periph bld    141   (   104   (   97      12-16-19 @ 06:49  ----------------------               4.4   (   24   (   11                             -----                        0.7  Ca  8.6   Mg  --    P   --     LFT  5.6  (  0.4  (  14       12-16-19 @ 06:49  -------------------------  3.3  (  52  (  8    RADIOLOGY & ADDITIONAL TESTS:  < from: US Spleen (12.13.19 @ 08:40) >  Impression  Splenomegaly 17.6 cm    < end of copied text >    MEDICATIONS:    aspirin  chewable 81 milliGRAM(s) Oral daily  atorvastatin 40 milliGRAM(s) Oral at bedtime  enoxaparin Injectable 40 milliGRAM(s) SubCutaneous at bedtime  ferrous sulfate Oral Tab/Cap - Peds 325 milliGRAM(s) Oral daily  folic acid 1 milliGRAM(s) Oral daily  metoprolol succinate ER 25 milliGRAM(s) Oral daily  polyethylene glycol 3350 17 Gram(s) Oral two times a day  senna 2 Tablet(s) Oral at bedtime

## 2019-12-16 NOTE — PROGRESS NOTE ADULT - ASSESSMENT
Pt is a 89 yo F with PMH colon CA with liver s/p resection 15 years back, dementia, anemia, HLD comes to the ED for inability to ambulate for 1 day duration.    # Inability to Ambulate:  likely combination of worsening functional status/ progression of dementia/ Anemia/ Vertebral compression fracture  no focal deficit  CT head and CT lumbar spine noted: L4 compression fracture of spine  conservative management per neurosurgery (agree) - pt fairly asymp in this regard  PT/Rehab for home PT    # Anemia: - normocytic; + early white cells in periph smear  prob has MDS; + splenomegaly  flow cyto sent - r/o chr leuk or lymphoprolif dz  SPEP, SIF - neg  keep hgb > 8 to relieve weakness/tiredness  B12, Fol and Iron all nl  LDH increased from prob cell turnover in spleen  hematology noted: for BM Bx tomorrow - results can be followed up as outpt    # Elevated leukocytosis - resolved - Afebrile  no clear source of infection  cxr clear  Bcx negative  Ucx - likely contaminate     # Dementia:  not on medication  likely advance vascular dementia  continue aspirin and statin    # Interstitial Lung disease: fairly asymp  diagnosed in 2017   on home o2 - cont as needed  patient did not follow up with pulmonary - can do so as outpt as desired    # Colon cancer with hx liver mets: s/p resection 15 years ago  in remission now    # H/o Ischemic CVA with left sided weakness in september  no focal deficit now  Continue with aspirin and statin  Diet: dysphagia 3    # DVT ppx: lovenox    # DNR/DNI; daughter is health care proxy; she will come to complete the MOLST form    Dispo: do BM Bx; tx 1 more u bld and keep hgb >8; rehab eval to set up HC (dtr does not want SNF)  anticipate d/c in about 48 hrs

## 2019-12-16 NOTE — PROGRESS NOTE ADULT - ASSESSMENT
Pt is a 89 yo F with PMH colon CA with liver s/p resection 15 years back, dementia, anemia, HLD comes to the ED for inability to ambulate for 1 day duration.    S/P 1 unit PRBC in Ed    patient assessed this morning, no significant overnight events. Not complaining of any active complains right now.    # Inability to Ambulate:  likely combination of worsening functional status/ progression of dementia/ Anemia/Vertebral compression fracture  no focal deficit  CT head and CT lumbar spine done  L4 compression fracture of spine  Likely conservative management (as per neurosurgery)  Pt/Rehab    # Anemia: - Macrocytic  anemia of chronic disease, cell count is downtrending WBC 11--> 4.35 platelet 245--> 142  s/p one unit PRBC by ED as ED was told by the daughter that she gets these weakness when more anemia and gets better with transfusion.  splenomegaly on CT abdomen (partially visualized)  ultrasound spleen  - Vit B12, Folic ACid levels - NL  -Negative sidra , SPEP- normal, k/l ratio-normal ,   ZOIE kappa and lambda elevated  - peripheral smear shows some blast cells.  fluocytometry pending  - Heme/onc will do Bone marrow biopsy tomorrow 12/17/2019      # Elevated leukocytosis -resolved   no clear source of infection  cxr clear  Bcx negative  Ucx - likely contaminate   WBCs below normal limit now    # Dementia:  not on medication  likely advance vascular dementia  progressing  continue aspirin and statin    # Interstitial Lung disease:  diagnosed in 2017 (refer one content)  was discharged on home o2  patient did not follow up with pulmonary   maintaining saturation at room air now    # Colon cancer with liver mets:  s/p resection 15 years ago  in remission    # H/o Ischemic CVA with left sided weakness in september  no focal deficit now  Continue with aspirin and statin    Diet: dysphagia 3          speech and swallow    DVT ppx: lovenox    DNR/DNI; daughter is health care proxy; she will come during the day and complete the molst form

## 2019-12-16 NOTE — DISCHARGE NOTE NURSING/CASE MANAGEMENT/SOCIAL WORK - PATIENT PORTAL LINK FT
You can access the FollowMyHealth Patient Portal offered by [facility] by registering at the following website: [email or web address]. By joining [facility]’s FollowMyHealth portal, you will also be able to view your health information using other applications (apps) compatible with our system.

## 2019-12-16 NOTE — DISCHARGE NOTE NURSING/CASE MANAGEMENT/SOCIAL WORK - NSFLUVACAGEDISCH_IMM_ALL_CORE
----- Message from Saira Simms sent at 3/18/2019 11:25 AM CDT -----  Needs Advice    Reason for call: 8-31-18 last med check ---mom told pt. needs med check before refill----- Dr. Stringer not available the rest of the week & on has 2 pills left, mom would like to if pt. Can be worked in or can refilled  be  Called in            Communication Preference: mom  [phone] or #[phone]    Additional Information:     Adult

## 2019-12-16 NOTE — PROGRESS NOTE ADULT - SUBJECTIVE AND OBJECTIVE BOX
Patient is a 89y old  Female who presents with a chief complaint of inability to ambulate (15 Dec 2019 05:54)      Subjective: No acute overnight events. She is doing good.   Sitting comfortable in the chair       Vital Signs Last 24 Hrs  T(C): 35.7 (16 Dec 2019 05:16), Max: 36.9 (15 Dec 2019 20:12)  T(F): 96.3 (16 Dec 2019 05:16), Max: 98.5 (15 Dec 2019 20:12)  HR: 64 (16 Dec 2019 05:16) (63 - 75)  BP: 140/76 (16 Dec 2019 05:16) (140/65 - 157/67)  BP(mean): --  RR: 18 (16 Dec 2019 05:16) (18 - 18)  SpO2: 95% (15 Dec 2019 16:09) (95% - 95%)    PHYSICAL EXAM  General: adult in NAD,  comfortable   HEENT: clear oropharynx  Neck: supple  CV: normal S1/S2 with no murmur rubs or gallops  Lungs: positive air movement b/l ant lungs,  Abdomen: soft non-tender   Neuro: alert and oriented X 4, no focal deficits    MEDICATIONS  (STANDING):  aspirin  chewable 81 milliGRAM(s) Oral daily  atorvastatin 40 milliGRAM(s) Oral at bedtime  enoxaparin Injectable 40 milliGRAM(s) SubCutaneous at bedtime  ferrous sulfate Oral Tab/Cap - Peds 325 milliGRAM(s) Oral daily  folic acid 1 milliGRAM(s) Oral daily  metoprolol succinate ER 25 milliGRAM(s) Oral daily  polyethylene glycol 3350 17 Gram(s) Oral two times a day  senna 2 Tablet(s) Oral at bedtime    MEDICATIONS  (PRN):      LABS:                          7.9    4.35  )-----------( 142      ( 16 Dec 2019 06:49 )             25.6         Mean Cell Volume : 83.4 fL  Mean Cell Hemoglobin : 25.7 pg  Mean Cell Hemoglobin Concentration : 30.9 g/dL  Auto Neutrophil # : 3.28 K/uL  Auto Lymphocyte # : 0.19 K/uL  Auto Monocyte # : 0.04 K/uL  Auto Eosinophil # : 0.00 K/uL  Auto Basophil # : 0.00 K/uL  Auto Neutrophil % : 74.5 %  Auto Lymphocyte % : 4.4 %  Auto Monocyte % : 0.9 %  Auto Eosinophil % : 0.0 %  Auto Basophil % : 0.0 %  Blasts %: 3.5: NOTIFIED DR GUTIÉRREZ 12/16/19 09:07 % (12.16.19 @ 06:49)        Serial CBC's  12-16 @ 06:49  Hct-25.6 / Hgb-7.9 / Plat-142 / RBC-3.07 / WBC-4.35  Serial CBC's  12-15 @ 07:32  Hct-25.4 / Hgb-7.6 / Plat-171 / RBC-3.03 / WBC-4.52  Serial CBC's  12-14 @ 07:57  Hct-24.3 / Hgb-7.5 / Plat-171 / RBC-2.94 / WBC-5.19  Serial CBC's  12-13 @ 11:07  Hct-26.2 / Hgb-8.1 / Plat-177 / RBC-3.16 / WBC-9.38  Serial CBC's  12-12 @ 21:02  Hct--- / Hgb--- / Plat--- / RBC-2.91 / WBC---  Serial CBC's  12-12 @ 19:14  Hct-24.7 / Hgb-7.5 / Plat-200 / RBC-2.94 / WBC-11.86      12-16    141  |  104  |  11  ----------------------------<  97  4.4   |  24  |  0.7    Ca    8.6      16 Dec 2019 06:49    TPro  5.6<L>  /  Alb  3.3<L>  /  TBili  0.4  /  DBili  x   /  AST  14  /  ALT  8   /  AlkPhos  52  12-16          Ferritin, Serum: 821 ng/mL (12-14 @ 07:57)  Folate, Serum: >20.0 ng/mL (12-14 @ 07:57)  Iron - Total Binding Capacity.: 175 ug/dL (12-14 @ 07:57)  Vitamin B12, Serum: 452 pg/mL (12-14 @ 07:57)  Reticulocyte Percent: 1.6 % (12-12 @ 21:02)      ZOIE Kappa: 3.73 mg/dL (12-14 @ 07:57)  ZOIE Lambda: 3.35 mg/dL (12-14 @ 07:57)  Serum Protein Electrophoresis Interp: Normal Electrophoresis Pattern (12-13 @ 11:07)

## 2019-12-16 NOTE — PHYSICAL THERAPY INITIAL EVALUATION ADULT - GAIT DISTANCE, PT EVAL
40 ft x 1, on room air, post ambulation on room air 93-94%. Pt fatigued, had recently ambulated with staff.

## 2019-12-16 NOTE — PHYSICAL THERAPY INITIAL EVALUATION ADULT - GENERAL OBSERVATIONS, REHAB EVAL
10:46-11:09 Pt encountered seated in b/s chair in NAD. + O2 at 2lpm, agreeable for PT. Daughter present. No c/o offered. SPO2 on O2 97%, On room air 97%, HR 60 bpm.

## 2019-12-17 ENCOUNTER — TRANSCRIPTION ENCOUNTER (OUTPATIENT)
Age: 84
End: 2019-12-17

## 2019-12-17 ENCOUNTER — RESULT REVIEW (OUTPATIENT)
Age: 84
End: 2019-12-17

## 2019-12-17 LAB
ALBUMIN SERPL ELPH-MCNC: 3.2 G/DL — LOW (ref 3.5–5.2)
ALP SERPL-CCNC: 50 U/L — SIGNIFICANT CHANGE UP (ref 30–115)
ALT FLD-CCNC: 9 U/L — SIGNIFICANT CHANGE UP (ref 0–41)
ANION GAP SERPL CALC-SCNC: 12 MMOL/L — SIGNIFICANT CHANGE UP (ref 7–14)
AST SERPL-CCNC: 15 U/L — SIGNIFICANT CHANGE UP (ref 0–41)
BASOPHILS # BLD AUTO: 0.02 K/UL — SIGNIFICANT CHANGE UP (ref 0–0.2)
BASOPHILS NFR BLD AUTO: 0.4 % — SIGNIFICANT CHANGE UP (ref 0–1)
BILIRUB SERPL-MCNC: 0.7 MG/DL — SIGNIFICANT CHANGE UP (ref 0.2–1.2)
BUN SERPL-MCNC: 12 MG/DL — SIGNIFICANT CHANGE UP (ref 10–20)
CALCIUM SERPL-MCNC: 8.8 MG/DL — SIGNIFICANT CHANGE UP (ref 8.5–10.1)
CHLORIDE SERPL-SCNC: 104 MMOL/L — SIGNIFICANT CHANGE UP (ref 98–110)
CO2 SERPL-SCNC: 24 MMOL/L — SIGNIFICANT CHANGE UP (ref 17–32)
CREAT SERPL-MCNC: 0.8 MG/DL — SIGNIFICANT CHANGE UP (ref 0.7–1.5)
CREATININE, URINE RESULT: 147 MG/DL — SIGNIFICANT CHANGE UP
EOSINOPHIL # BLD AUTO: 0.01 K/UL — SIGNIFICANT CHANGE UP (ref 0–0.7)
EOSINOPHIL NFR BLD AUTO: 0.2 % — SIGNIFICANT CHANGE UP (ref 0–8)
GLUCOSE SERPL-MCNC: 94 MG/DL — SIGNIFICANT CHANGE UP (ref 70–99)
HCT VFR BLD CALC: 27.8 % — LOW (ref 37–47)
HGB BLD-MCNC: 8.7 G/DL — LOW (ref 12–16)
IMM GRANULOCYTES NFR BLD AUTO: 12.3 % — HIGH (ref 0.1–0.3)
INR BLD: 1.24 RATIO — SIGNIFICANT CHANGE UP (ref 0.65–1.3)
LYMPHOCYTES # BLD AUTO: 0.31 K/UL — LOW (ref 1.2–3.4)
LYMPHOCYTES # BLD AUTO: 7 % — LOW (ref 20.5–51.1)
MCHC RBC-ENTMCNC: 26.1 PG — LOW (ref 27–31)
MCHC RBC-ENTMCNC: 31.3 G/DL — LOW (ref 32–37)
MCV RBC AUTO: 83.5 FL — SIGNIFICANT CHANGE UP (ref 81–99)
MONOCYTES # BLD AUTO: 0.28 K/UL — SIGNIFICANT CHANGE UP (ref 0.1–0.6)
MONOCYTES NFR BLD AUTO: 6.3 % — SIGNIFICANT CHANGE UP (ref 1.7–9.3)
NEUTROPHILS # BLD AUTO: 3.29 K/UL — SIGNIFICANT CHANGE UP (ref 1.4–6.5)
NEUTROPHILS NFR BLD AUTO: 73.8 % — SIGNIFICANT CHANGE UP (ref 42.2–75.2)
NRBC # BLD: 0 /100 WBCS — SIGNIFICANT CHANGE UP (ref 0–0)
PLATELET # BLD AUTO: 151 K/UL — SIGNIFICANT CHANGE UP (ref 130–400)
POTASSIUM SERPL-MCNC: 4.6 MMOL/L — SIGNIFICANT CHANGE UP (ref 3.5–5)
POTASSIUM SERPL-SCNC: 4.6 MMOL/L — SIGNIFICANT CHANGE UP (ref 3.5–5)
PROT ?TM UR-MCNC: 52 MG/DL — HIGH (ref 0–12)
PROT SERPL-MCNC: 5.6 G/DL — LOW (ref 6–8)
PROTHROM AB SERPL-ACNC: 14.2 SEC — HIGH (ref 9.95–12.87)
RBC # BLD: 3.33 M/UL — LOW (ref 4.2–5.4)
RBC # FLD: 19.1 % — HIGH (ref 11.5–14.5)
SODIUM SERPL-SCNC: 140 MMOL/L — SIGNIFICANT CHANGE UP (ref 135–146)
WBC # BLD: 4.46 K/UL — LOW (ref 4.8–10.8)
WBC # FLD AUTO: 4.46 K/UL — LOW (ref 4.8–10.8)

## 2019-12-17 PROCEDURE — 88305 TISSUE EXAM BY PATHOLOGIST: CPT | Mod: 26

## 2019-12-17 PROCEDURE — 99232 SBSQ HOSP IP/OBS MODERATE 35: CPT

## 2019-12-17 PROCEDURE — 88313 SPECIAL STAINS GROUP 2: CPT | Mod: 26

## 2019-12-17 PROCEDURE — 88311 DECALCIFY TISSUE: CPT | Mod: 26

## 2019-12-17 PROCEDURE — 88341 IMHCHEM/IMCYTCHM EA ADD ANTB: CPT | Mod: 26

## 2019-12-17 PROCEDURE — 88342 IMHCHEM/IMCYTCHM 1ST ANTB: CPT | Mod: 26,59

## 2019-12-17 PROCEDURE — 88189 FLOWCYTOMETRY/READ 16 & >: CPT

## 2019-12-17 RX ORDER — LIDOCAINE HCL 20 MG/ML
10 VIAL (ML) INJECTION ONCE
Refills: 0 | Status: COMPLETED | OUTPATIENT
Start: 2019-12-17 | End: 2019-12-17

## 2019-12-17 RX ORDER — SENNA PLUS 8.6 MG/1
1 TABLET ORAL AT BEDTIME
Refills: 0 | Status: DISCONTINUED | OUTPATIENT
Start: 2019-12-17 | End: 2019-12-18

## 2019-12-17 RX ADMIN — ATORVASTATIN CALCIUM 40 MILLIGRAM(S): 80 TABLET, FILM COATED ORAL at 22:54

## 2019-12-17 RX ADMIN — Medication 25 MILLIGRAM(S): at 05:27

## 2019-12-17 RX ADMIN — Medication 325 MILLIGRAM(S): at 12:00

## 2019-12-17 RX ADMIN — ENOXAPARIN SODIUM 40 MILLIGRAM(S): 100 INJECTION SUBCUTANEOUS at 22:54

## 2019-12-17 RX ADMIN — Medication 1 MILLIGRAM(S): at 12:00

## 2019-12-17 RX ADMIN — Medication 81 MILLIGRAM(S): at 12:00

## 2019-12-17 RX ADMIN — Medication 10 MILLILITER(S): at 17:08

## 2019-12-17 NOTE — SWALLOW BEDSIDE ASSESSMENT ADULT - SLP GENERAL OBSERVATIONS
Pt received OOB to chair awake and alert on room air
Pt received in bed w/ daughter at b/s who is an [employer] employee

## 2019-12-17 NOTE — PROGRESS NOTE ADULT - ASSESSMENT
Pt is a 88 yo F with PMH colon CA with liver s/p resection 15 years back, dementia, anemia, HLD comes to the ED for inability to ambulate for 1 day duration.    # Inability to Ambulate:  likely combination of worsening functional status/ progression of dementia/ Anemia/ Vertebral compression fracture  no focal deficit  CT head and CT lumbar spine noted: L4 compression fracture of spine  conservative management per neurosurgery (agree) - pt fairly asymp in this regard  PT/Rehab for home PT    # Anemia: normocytic; + early white cells in periph smear  prob has MDS; + splenomegaly on imaging  SPEP, SIF - neg  B12, Fol and Iron all nl  LDH increased from prob cell turnover in spleen  keep hgb > 8 to relieve weakness/tiredness  f/u flow cyto sent - r/o chr leuk or lymphoprolif dz  hematology noted: for BM Bx today - if no acute leukemia on microscopic analysis, then remainder of results can be followed up as outpt    # Elevated leukocytosis - resolved - Afebrile  no clear source of infection  cxr clear  Bcx negative  Ucx - likely contaminate     # Dementia:  not on medication  likely advance vascular dementia  continue aspirin and statin    # Interstitial Lung disease: fairly asymp  diagnosed in 2017   on home o2 - cont as needed  patient did not follow up with pulmonary (Dr Guido) - can do so as outpt as desired    # Colon cancer with hx liver mets: s/p resection 15 years ago  in remission now    # 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

## 2019-12-17 NOTE — PROGRESS NOTE ADULT - SUBJECTIVE AND OBJECTIVE BOX
Patient is a 89y old  Female who presents with a chief complaint of inability to ambulate (16 Dec 2019 16:30)      Subjective: No overnight events  She is doing well      Vital Signs Last 24 Hrs  T(C): 36.2 (17 Dec 2019 04:55), Max: 36.2 (17 Dec 2019 04:55)  T(F): 97.1 (17 Dec 2019 04:55), Max: 97.1 (17 Dec 2019 04:55)  HR: 61 (17 Dec 2019 04:55) (61 - 64)  BP: 145/63 (17 Dec 2019 04:55) (134/63 - 145/63)  BP(mean): --  RR: 17 (17 Dec 2019 04:55) (17 - 18)  SpO2: 95% (16 Dec 2019 20:26) (95% - 95%)    PHYSICAL EXAM  General: adult in NAD  HEENT: clear oropharynx  Neck: supple  CV: normal S1/S2   Lungs: positive air movement b/l ant lungs  Abdomen: soft non-tender   \Skin: no rashes and no petechiae  Neuro: AWake and alert    MEDICATIONS  (STANDING):  aspirin  chewable 81 milliGRAM(s) Oral daily  atorvastatin 40 milliGRAM(s) Oral at bedtime  enoxaparin Injectable 40 milliGRAM(s) SubCutaneous at bedtime  ferrous sulfate Oral Tab/Cap - Peds 325 milliGRAM(s) Oral daily  folic acid 1 milliGRAM(s) Oral daily  metoprolol succinate ER 25 milliGRAM(s) Oral daily  senna 2 Tablet(s) Oral at bedtime    MEDICATIONS  (PRN):      LABS:                          8.7    4.46  )-----------( 151      ( 17 Dec 2019 07:35 )             27.8         Mean Cell Volume : 83.5 fL  Mean Cell Hemoglobin : 26.1 pg  Mean Cell Hemoglobin Concentration : 31.3 g/dL  Auto Neutrophil # : 3.29 K/uL  Auto Lymphocyte # : 0.31 K/uL  Auto Monocyte # : 0.28 K/uL  Auto Eosinophil # : 0.01 K/uL  Auto Basophil # : 0.02 K/uL  Auto Neutrophil % : 73.8 %  Auto Lymphocyte % : 7.0 %  Auto Monocyte % : 6.3 %  Auto Eosinophil % : 0.2 %  Auto Basophil % : 0.4 %      Serial CBC's  12-17 @ 07:35  Hct-27.8 / Hgb-8.7 / Plat-151 / RBC-3.33 / WBC-4.46  Serial CBC's  12-16 @ 06:49  Hct-25.6 / Hgb-7.9 / Plat-142 / RBC-3.07 / WBC-4.35  Serial CBC's  12-15 @ 07:32  Hct-25.4 / Hgb-7.6 / Plat-171 / RBC-3.03 / WBC-4.52  Serial CBC's  12-14 @ 07:57  Hct-24.3 / Hgb-7.5 / Plat-171 / RBC-2.94 / WBC-5.19  Serial CBC's  12-13 @ 11:07  Hct-26.2 / Hgb-8.1 / Plat-177 / RBC-3.16 / WBC-9.38      12-17    140  |  104  |  12  ----------------------------<  94  4.6   |  24  |  0.8    Ca    8.8      17 Dec 2019 07:35    TPro  5.6<L>  /  Alb  3.2<L>  /  TBili  0.7  /  DBili  x   /  AST  15  /  ALT  9   /  AlkPhos  50  12-17      PT/INR - ( 17 Dec 2019 07:35 )   PT: 14.20 sec;   INR: 1.24 ratio             Vitamin B12, Serum: 466 pg/mL (12-15 @ 07:32)  Folate, Serum: >20.0 ng/mL (12-15 @ 07:32)  Ferritin, Serum: 836 ng/mL (12-15 @ 07:32)  Ferritin, Serum: 821 ng/mL (12-14 @ 07:57)  Folate, Serum: >20.0 ng/mL (12-14 @ 07:57)  Iron - Total Binding Capacity.: 175 ug/dL (12-14 @ 07:57)  Vitamin B12, Serum: 452 pg/mL (12-14 @ 07:57)  Reticulocyte Percent: 1.6 % (12-12 @ 21:02)      Immunofixation, Serum:   No Monoclonal Band Identified    Reference Range: None Detected (12-14 @ 07:57)  ZOIE Kappa: 3.73 mg/dL (12-14 @ 07:57)  ZOIE Lambda: 3.35 mg/dL (12-14 @ 07:57)  Serum Protein Electrophoresis Interp: Normal Electrophoresis Pattern (12-14 @ 07:57)  Serum Protein Electrophoresis Interp: Normal Electrophoresis Pattern (12-13 @ 11:07)      Flow Cytometry Order. (12.13.19 @ 17:23)    TM Interpretation:   Flow Cytometry Final Report  ________________________________________________________________________  Specimen: Peripheral blood  Collected: 12/13/2019 16:45  Received: 12/16/2019 6:18  Processed: 12/16/2019 6:30  Reported: 12/16/2019 18:29  Accession #: [id number]  Surgical Pathology Number:  ________________________________________________________________________  CLINICAL DATA: R/O MDS    ________________________________________________________________________  CD45/Side Scatter Differential  ________________________________________________________________________  DIAGNOSIS:  Peripheral blood:       - The immunophenotypic findings show 8% myeloblasts present (positive for CD34, CD13, partial  CD33, , HLA-DR, minimal CD56; negative for CD14, CD64, CD16, CD11b, CD15, CD7, CD2). The  lymphocyte immunophenotypic findings show no diagnostic abnormalities.  Please see interpretation.    INTERPRETATION:  MORPHOLOGY:  SMEAR: 6% blasts, left-shifted granulocytes (some appear hypogranular)    IMMUNOPHENOTYPE: CD45/side scatter shows 8% myeloblasts, positive for CD34, CD13, partial CD33,  , HLA-DR, minimal CD56; negative for CD14, CD64, CD16, CD11b, CD15, CD7, CD2.  Lymphocytes (4% of cells): Heterogeneous population of T-cells (with normal CD4 to CD8 ratio),  natural killer cells, and rare/absent B-cells.    The immunophenotypic findings show 8% myeloblasts present (positive for CD34, CD13, partial CD33,  , HLA-DR, minimal CD56; negative for CD14, CD64, CD16, CD11b, CD15, CD7, CD2).  The lymphocyte immunophenotypic findings show no diagnostic abnormalities.    Correlation with clinical history and laboratory findings is essential.  _____________________________________________________________________  Viability ................. 98 %    Values reported are based on the lymphocyte gate. (Bright CD45 positive; low side scatter, low  forward scatter).  4% of cells.    CD45 .......... 100 %  CD2 ........... 91 %  CD3 ........... 48 %  CD5 ........... 47 %  CD7........... 84 %  CD4 ........... 37 %  CD8 ........... 19 %  CD16 .......... 27 %  CD56........... 43 %  CD57 .......... 42 %  CD3-/CD16,56+ . 28 %  CD10 .......... 2 %  CD19 .......... 1 %  CD20 .......... 14 %  CD23 .......... 1 %  FMC-7 .........2 %  CD5+/CD19+ .... <1 %  kappa ......... 1 %  lambda ........ 1 %  HLA-DR ........ 37 %  CD38 .......... 35 %

## 2019-12-17 NOTE — PROGRESS NOTE ADULT - ASSESSMENT
89 yo F with PMH colon CA with liver s/p resection 15 years back, dementia, anemia, DLD comes to the ED for inability to ambulate. Hematology/oncology called for anemia.    1. Acute on chronic Normocytic Anemia with splenomegaly- with blasts like cells on peripheral smear : Suspect primary Bone marrow etiology- r/o MDS/AML  LDH-333, low ret count   -peripheral smear reviewed today- shows anisocytosis, poikilocytosis, fragmented RBcs,  teardrop cell, megakaryocyte fragment and immature WBC. Few blasts like cells were noted   Normal iron studies with ferritin- 800, normal B12, folate, haptoglobin  Negative sidra , SPEP- normal, k/l ratio-normal . f/u ZOIE  Flow shows 8% myeloblasts  Will do BM biopsy and aspiration today.   She should stay in the hospital, until prelim BM results are available   -transfuse as needed to Keep Hb>7    2. Hx Colon cancer with liver met s/p resection and FOLFOX in 2015  -stable    Outpt appt made with Dr Brannon- 1/6/20 at 5pm    Discussed with Dr Brannon  Discussed with med resident

## 2019-12-17 NOTE — DISCHARGE NOTE PROVIDER - NSDCCPCAREPLAN_GEN_ALL_CORE_FT
PRINCIPAL DISCHARGE DIAGNOSIS  Diagnosis: Anemia  Assessment and Plan of Treatment: Your hemoglobin was found to be low, you received 2 units of blood during your hospital stay. The peripheral smear of the blood showed some immature cells, bone marrow biopsy was done. Follow up with results to rule out any malignancy. If the patient feels weak , follow up with primary doctor to get CBC checked, and get blood transfusion as needed.      SECONDARY DISCHARGE DIAGNOSES  Diagnosis: Lung interstitial disease  Assessment and Plan of Treatment: You were diagnosed with the interstitial lung disease 2 years ago in the past. Your symptoms seem to be well controlled. Follow up Dr. Guido in case you have any shortness of breath.    Diagnosis: Vascular dementia  Assessment and Plan of Treatment: Your dementia can secondary to stroke you had in the past. You are recommended to continue take aspirin and statin for preventive measures    Diagnosis: History of vertebral fracture repair  Assessment and Plan of Treatment: You had a vertebral fracture. Neurosurgery recommended conservative management. Follow up outpatient. PRINCIPAL DISCHARGE DIAGNOSIS  Diagnosis: Anemia  Assessment and Plan of Treatment: Your hemoglobin was found to be low, you received 2 units of blood during your hospital stay. The peripheral smear of the blood showed some immature cells, bone marrow biopsy was done. Follow up with heme/oncology  to rule out any malignancy. If the patient feels weak , follow up with primary doctor to get CBC checked, and get blood transfusion as needed.      SECONDARY DISCHARGE DIAGNOSES  Diagnosis: Lung interstitial disease  Assessment and Plan of Treatment: You were diagnosed with the interstitial lung disease 2 years ago in the past. Your symptoms seem to be well controlled. Follow up Dr. Guido in case you have any shortness of breath.    Diagnosis: Vascular dementia  Assessment and Plan of Treatment: Your dementia can secondary to stroke you had in the past. You are recommended to continue take aspirin and statin for preventive measures    Diagnosis: History of vertebral fracture repair  Assessment and Plan of Treatment: You had a vertebral fracture. Neurosurgery recommended conservative management. Follow up outpatient.

## 2019-12-17 NOTE — PROGRESS NOTE ADULT - SUBJECTIVE AND OBJECTIVE BOX
WILLIAN SETH  89y  Female  ***My note supersedes ALL resident notes that I sign.  My corrections for their notes are in my note.***    I can be reached directly on [id number]. My office number is [phone]. My personal cell number is [phone].    INTERVAL EVENTS: Here for f/u of weakness. Pt feels fine. Having multiple BMs w/ laxatives. Pt agreeable to BM bx. Dtr agreeable to pt going home.    T(F): 97.1 (12-17-19 @ 04:55), Max: 97.1 (12-17-19 @ 04:55)  HR: 61 (12-17-19 @ 04:55) (61 - 64)  BP: 145/63 (12-17-19 @ 04:55) (134/63 - 145/63)  RR: 17 (12-17-19 @ 04:55) (17 - 18)  SpO2: 95% (12-16-19 @ 20:26) (95% - 95%)    Gen: NAD  HEENT: WNL; except conj pale  Neck: no JVD, no nodes; thyroid nl  lungs: clr  Hrt: s1 s2 rrr  abd: soft, NT/ND, splenomeg not apprec  ext: no edema, no c/c    LABS:                        8.7     (    83.5   4.46  )-----------( ---------      151      ( 17 Dec 2019 07:35 )             27.8    (    19.1     Hemoglobin: 8.7 g/dL (12-17 @ 07:35)  Hemoglobin: 7.9 g/dL (12-16 @ 06:49) - got 1u prbc  Hemoglobin: 7.6 g/dL (12-15 @ 07:32)  Hemoglobin: 7.5 g/dL (12-14 @ 07:57)  Hemoglobin: 8.1 g/dL (12-13 @ 11:07)  Hemoglobin: 7.5 g/dL (12-12 @ 19:14)    140   (   104   (   94      12-17-19 @ 07:35  ----------------------               4.6   (   24   (   12                             -----                        0.8  Ca  8.8   Mg  --    P   --     LFT  5.6  (  0.7  (  15       12-17-19 @ 07:35  -------------------------  3.2  (  50  (  9    PT/INR - ( 17 Dec 2019 07:35 )   PT: 14.20 sec;   INR: 1.24 ratio      RADIOLOGY & ADDITIONAL TESTS:      MEDICATIONS:    aspirin  chewable 81 milliGRAM(s) Oral daily  atorvastatin 40 milliGRAM(s) Oral at bedtime  enoxaparin Injectable 40 milliGRAM(s) SubCutaneous at bedtime  ferrous sulfate Oral Tab/Cap - Peds 325 milliGRAM(s) Oral daily  folic acid 1 milliGRAM(s) Oral daily  metoprolol succinate ER 25 milliGRAM(s) Oral daily  senna 1 Tablet(s) Oral at bedtime PRN

## 2019-12-17 NOTE — DISCHARGE NOTE PROVIDER - HOSPITAL COURSE
89 yo F with PMH colon CA with liver s/p resection 15 years back, dementia, anemia, HLD comes to the ED for inability to ambulate for 1 day duration.    Patient needs help in her daily activities but was able to walk a little with walker but yesterday she felt too weak and was unable to walk.    She does not complain of pain anywhere in the body or any fall or trauma.    No SOB, chest pain, abd pain, dysuria, headache, or back pain.    She was admitted in september for pontine stroke with left sided weakness discharged on home physical therapy.    She has progressive dementia likely vascular dementia takes aspirin and statin.    She uses oxygen at home on/off for the lung condition that was prescribed to her 2 years ago; daughter is unaware of the lung problem.    patient has a home health aid and daughter is daughter is directly involved in patient care.         # Inability to Ambulate:    likely combination of worsening functional status/ progression of dementia/ Anemia/ Vertebral compression fracture    no focal deficit    CT head and CT lumbar spine noted: L4 compression fracture of spine    conservative management per neurosurgery (agree) - pt fairly asymp in this regard    PT/Rehab for home PT        Acute on chronic Normocytic Anemia with splenomegaly- with blasts like cells on peripheral smear : Suspect primary Bone marrow etiology- r/o MDS/AML    LDH-333, low ret count     -peripheral smear reviewed today- shows anisocytosis, poikilocytosis, fragmented RBcs,  teardrop cell, megakaryocyte fragment and immature WBC. Few blasts like cells were noted     Normal iron studies with ferritin- 800, normal B12, folate, haptoglobin    Negative sidra , SPEP- normal, k/l ratio-normal . f/u ZOIE    Flow shows 8% myeloblasts    BM biopsy done 12/17/2019     -transfuse as needed to Keep Hb>7            # Elevated leukocytosis - resolved - Afebrile    no clear source of infection    cxr clear    Bcx negative    Ucx - likely contaminate         # Elevated leukocytosis -resolved     no clear source of infection    cxr clear    Bcx negative    Ucx - likely contaminate     WBCs below normal limit now        # Dementia:    not on medication    likely advance vascular dementia    progressing    continue aspirin and statin        # Interstitial Lung disease:    diagnosed in 2017 (refer one content)    was discharged on home o2    patient did not follow up with pulmonary     maintaining saturation at room air now        # Colon cancer with liver mets:    s/p resection 15 years ago    in remission        # H/o Ischemic CVA with left sided weakness in september    no focal deficit now    Continue with aspirin and statin        Diet: dysphagia 3            speech and swallow        DVT ppx: lovenox        DNR/DNI; daughter is health care proxy; she will come during the day and complete the molst form 89 yo F with PMH colon CA with liver s/p resection 15 years back, dementia, anemia, HLD comes to the ED for inability to ambulate for 1 day duration.    Patient needs help in her daily activities but was able to walk a little with walker but yesterday she felt too weak and was unable to walk.    She does not complain of pain anywhere in the body or any fall or trauma.    No SOB, chest pain, abd pain, dysuria, headache, or back pain.    She was admitted in september for pontine stroke with left sided weakness discharged on home physical therapy.    She has progressive dementia likely vascular dementia takes aspirin and statin.    She uses oxygen at home on/off for the lung condition that was prescribed to her 2 years ago; daughter is unaware of the lung problem.    patient has a home health aid and daughter is daughter is directly involved in patient care.         # Inability to Ambulate:    likely combination of worsening functional status/ progression of dementia/ Anemia/ Vertebral compression fracture    no focal deficit    CT head and CT lumbar spine noted: L4 compression fracture of spine    conservative management per neurosurgery (agree) - pt fairly asymp in this regard    PT/Rehab for home PT        Acute on chronic Normocytic Anemia with splenomegaly- with blasts like cells on peripheral smear : Suspect primary Bone marrow etiology- r/o MDS/AML    LDH-333, low ret count     -peripheral smear reviewed today- shows anisocytosis, poikilocytosis, fragmented RBcs,  teardrop cell, megakaryocyte fragment and immature WBC. Few blasts like cells were noted     Normal iron studies with ferritin- 800, normal B12, folate, haptoglobin    Negative sidra , SPEP- normal, k/l ratio-normal . f/u ZOIE    Flow shows 8% myeloblasts    BM biopsy done 12/17/2019 - shows ALL like picture. f/u with hem onc for official biopsy results    -transfuse as needed to Keep Hb>7            # Elevated leukocytosis - resolved - Afebrile    no clear source of infection    cxr clear    Bcx negative    Ucx - likely contaminate         # Elevated leukocytosis -resolved     no clear source of infection    cxr clear    Bcx negative    Ucx - likely contaminate     WBCs below normal limit now        # Dementia:    not on medication    likely advance vascular dementia    progressing    continue aspirin and statin        # Interstitial Lung disease:    diagnosed in 2017 (refer one content)    was discharged on home o2    patient did not follow up with pulmonary     maintaining saturation at room air now        # Colon cancer with liver mets:    s/p resection 15 years ago    in remission        # H/o Ischemic CVA with left sided weakness in september    no focal deficit now    Continue with aspirin and statin        Diet: dysphagia 3            speech and swallow        DVT ppx: lovenox        DNR/DNI; daughter is health care proxy; she will come during the day and complete the molst form

## 2019-12-17 NOTE — DISCHARGE NOTE PROVIDER - CARE PROVIDER_API CALL
Juani Brannon)  Hematology; Internal Medicine; Medical Oncology  [address] [place] [address]  Phone: [phone]  Fax: [phone]  Follow Up Time:

## 2019-12-17 NOTE — DISCHARGE NOTE PROVIDER - NSDCMRMEDTOKEN_GEN_ALL_CORE_FT
aspirin 81 mg oral tablet, chewable: 1 tab(s) orally once a day  atorvastatin 40 mg oral tablet: 1 tab(s) orally once a day (at bedtime)  ferrous sulfate 325 mg (65 mg elemental iron) oral tablet: 1 tab(s) orally once a day   folic acid 1 mg oral tablet: 1 tab(s) orally once a day  metoprolol succinate 25 mg oral tablet, extended release: 1 tab(s) orally once a day  senna oral tablet: 2 tab(s) orally once a day

## 2019-12-17 NOTE — PROGRESS NOTE ADULT - ASSESSMENT
Pt is a 88 yo F with PMH colon CA with liver s/p resection 15 years back, dementia, anemia, HLD comes to the ED for inability to ambulate for 1 day duration.    S/P 1 unit PRBC in Ed    patient assessed this morning, no significant overnight events. Not complaining of any active complains right now.    # Inability to Ambulate:  likely combination of worsening functional status/ progression of dementia/ Anemia/Vertebral compression fracture  no focal deficit  CT head and CT lumbar spine done  L4 compression fracture of spine  Likely conservative management (as per neurosurgery)  Pt/Rehab    # Anemia: - Macrocytic  anemia of chronic disease, cell count is downtrending WBC 11--> 4.4 platelet 245--> 142  s/p 1u PRBC on 12/17/2019 Hb 8.7mg/dl.  splenomegaly on CT abdomen (partially visualized)  ultrasound spleen  - Vit B12, Folic ACid levels - NL  -Negative sidra , SPEP- normal, k/l ratio-normal ,   ZOIE kappa and lambda elevated  - peripheral smear shows some blast cells.  fluocytometry pending  - Heme/onc plans to do Bone marrow biopsy today, patient will stay in patient hospital till prelim biopsy results are available      # Elevated leukocytosis -resolved   no clear source of infection  cxr clear  Bcx negative  Ucx - likely contaminate   WBCs below normal limit now    # Dementia:  not on medication  likely advance vascular dementia  progressing  continue aspirin and statin    # Interstitial Lung disease:  diagnosed in 2017 (refer one content)  was discharged on home o2  patient did not follow up with pulmonary   maintaining saturation at room air now    # Colon cancer with liver mets:  s/p resection 15 years ago  in remission    # H/o Ischemic CVA with left sided weakness in september  no focal deficit now  Continue with aspirin and statin    Diet: dysphagia 3          speech and swallow    DVT ppx: lovenox    DNR/DNI; daughter is health care proxy; she will come during the day and complete the molst form

## 2019-12-17 NOTE — PROGRESS NOTE ADULT - SUBJECTIVE AND OBJECTIVE BOX
LENGTH OF HOSPITAL STAY: 4d    CHIEF COMPLAINT:   Patient is a 89y old  Female who presents with a chief complaint of inability to ambulate (17 Dec 2019 12:55)      HISTORY OF PRESENTING ILLNESS:    HPI:  88 yo F with PMH colon CA with liver s/p resection 15 years back, dementia, anemia, HLD comes to the ED for inability to ambulate for 1 day duration.  Patient needs help in her daily activities but was able to walk a little with walker but yesterday she felt too weak and was unable to walk.  She does not complain of pain anywhere in the body or any fall or trauma.  No SOB, chest pain, abd pain, dysuria, headache, or back pain.  She was admitted in september for pontine stroke with left sided weakness discharged on home physical therapy.  She has progressive dementia likely vascular dementia takes aspirin and statin.  She uses oxygen at home on/off for the lung condition that was prescribed to her 2 years ago; daughter is unaware of the lung problem.  patient has a home health aid and daughter is daughter is directly involved in patient care. (13 Dec 2019 03:50)    PAST MEDICAL & SURGICAL HISTORY  PAST MEDICAL & SURGICAL HISTORY:  CVA (cerebral vascular accident)  Anemia  HLD (hyperlipidemia)  Colon cancer metastasized to liver: Underwent surgical resection 15 years ago  History of colon resection    SOCIAL HISTORY:    ALLERGIES:  No Known Allergies    MEDICATIONS:  STANDING MEDICATIONS  aspirin  chewable 81 milliGRAM(s) Oral daily  atorvastatin 40 milliGRAM(s) Oral at bedtime  enoxaparin Injectable 40 milliGRAM(s) SubCutaneous at bedtime  ferrous sulfate Oral Tab/Cap - Peds 325 milliGRAM(s) Oral daily  folic acid 1 milliGRAM(s) Oral daily  lidocaine 2% Injectable 10 milliLiter(s) Local Injection once  metoprolol succinate ER 25 milliGRAM(s) Oral daily    PRN MEDICATIONS  senna 1 Tablet(s) Oral at bedtime PRN    VITALS:   T(F): 97.4  HR: 70  BP: 132/62  RR: 18  SpO2: 95%    LABS:                        8.7    4.46  )-----------( 151      ( 17 Dec 2019 07:35 )             27.8     12-17    140  |  104  |  12  ----------------------------<  94  4.6   |  24  |  0.8    Ca    8.8      17 Dec 2019 07:35    TPro  5.6<L>  /  Alb  3.2<L>  /  TBili  0.7  /  DBili  x   /  AST  15  /  ALT  9   /  AlkPhos  50  12-17    PT/INR - ( 17 Dec 2019 07:35 )   PT: 14.20 sec;   INR: 1.24 ratio                       RADIOLOGY:  < from: US Spleen (12.13.19 @ 08:40) >  splenomegaly  Comparison 12 12/19 and 2/15/2017  The spleen is enlarged measuring 17.6 cm in sagittal diameter. No splenic   mass is seen. This appears new from 2017 and unchanged from 2019.   Etiology not apparent in this exam.  Impression  Splenomegaly    < end of copied text >    PHYSICAL EXAM:  GEN: In no acute distress. Pt. is awake in bed able to have a conversation.  LUNGS: CTABL, Symmetrical inspiration, no increased work of breathing  HEART: +S1,S2, RRR, No murmurs apprecaited  ABD: Bowel Sounds Present, Soft, non tender, non distended, no guarding, no rebound.   EXT: 2+ peripheral Pulses, no clubbing, no cyanosis, no Edema. Able to Flex Bl LE and wiggle toes. Strength UE within normal limits.   NEURO: AAOX2(not to date). No focal deficits. CN 2-12 Grossly intact.

## 2019-12-17 NOTE — DISCHARGE NOTE PROVIDER - NSDCFUSCHEDAPPT_GEN_ALL_CORE_FT
WILLIAN SETH ; 01/06/2020 ; [facility] [address] WILLIAN SETH ; 12/20/2019 ; [facility] [address] WILL[address] Gillespie ; 12/23/2019 ; [facility] [address] Sinha ; 01/06/2020 ; [facility] [address] WILLIAN SETH ; 12/20/2019 ; [facility] [address]pie ; 12/23/2019 ; [facility] [address] WILLIAN Sinha ; 01/06/2020 ; [facility] [address] WILLIAN SETH ; 12/20/2019 ; [facility] [address] Gillespie ; 12/23/2019 ; [facility] [address] WILLIAN Sinha ; 01/06/2020 ; [facility] [address]

## 2019-12-18 ENCOUNTER — INBOUND DOCUMENT (OUTPATIENT)
Age: 84
End: 2019-12-18

## 2019-12-18 VITALS
RESPIRATION RATE: 17 BRPM | TEMPERATURE: 97 F | DIASTOLIC BLOOD PRESSURE: 62 MMHG | SYSTOLIC BLOOD PRESSURE: 136 MMHG | HEART RATE: 67 BPM

## 2019-12-18 LAB
% GAMMA, URINE: 6.9 % — SIGNIFICANT CHANGE UP
ALBUMIN 24H MFR UR ELPH: 22 % — SIGNIFICANT CHANGE UP
ALPHA1 GLOB 24H MFR UR ELPH: 41.8 % — SIGNIFICANT CHANGE UP
ALPHA2 GLOB 24H MFR UR ELPH: 16.1 % — SIGNIFICANT CHANGE UP
B-GLOBULIN 24H MFR UR ELPH: 13.2 % — SIGNIFICANT CHANGE UP
HCT VFR BLD CALC: 27.6 % — LOW (ref 37–47)
HGB BLD-MCNC: 8.5 G/DL — LOW (ref 12–16)
INTERPRETATION 24H UR IFE-IMP: SIGNIFICANT CHANGE UP
INTERPRETATION 24H UR IFE-IMP: SIGNIFICANT CHANGE UP
M PROTEIN 24H UR ELPH-MRATE: SIGNIFICANT CHANGE UP
MCHC RBC-ENTMCNC: 26 PG — LOW (ref 27–31)
MCHC RBC-ENTMCNC: 30.8 G/DL — LOW (ref 32–37)
MCV RBC AUTO: 84.4 FL — SIGNIFICANT CHANGE UP (ref 81–99)
NRBC # BLD: 0 /100 WBCS — SIGNIFICANT CHANGE UP (ref 0–0)
PLATELET # BLD AUTO: 181 K/UL — SIGNIFICANT CHANGE UP (ref 130–400)
PROT ?TM UR-MCNC: 52 MG/DL — HIGH (ref 0–12)
PROT PATTERN 24H UR ELPH-IMP: SIGNIFICANT CHANGE UP
RBC # BLD: 3.27 M/UL — LOW (ref 4.2–5.4)
RBC # FLD: 19.7 % — HIGH (ref 11.5–14.5)
TOTAL VOLUME - 24 HOUR: SIGNIFICANT CHANGE UP ML
URINE CREATININE CALCULATION: SIGNIFICANT CHANGE UP G/24 H (ref 0.8–1.8)
WBC # BLD: 5 K/UL — SIGNIFICANT CHANGE UP (ref 4.8–10.8)
WBC # FLD AUTO: 5 K/UL — SIGNIFICANT CHANGE UP (ref 4.8–10.8)

## 2019-12-18 PROCEDURE — 99239 HOSP IP/OBS DSCHRG MGMT >30: CPT

## 2019-12-18 RX ADMIN — Medication 1 MILLIGRAM(S): at 11:41

## 2019-12-18 RX ADMIN — Medication 325 MILLIGRAM(S): at 11:41

## 2019-12-18 RX ADMIN — Medication 25 MILLIGRAM(S): at 05:53

## 2019-12-18 RX ADMIN — Medication 81 MILLIGRAM(S): at 11:41

## 2019-12-18 NOTE — PROGRESS NOTE ADULT - ASSESSMENT
Pt is a 89 yo F with PMH colon CA with liver s/p resection 15 years back, dementia, anemia, HLD comes to the ED for inability to ambulate for 1 day duration.    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); + splenomegaly on imaging  SPEP, SIF - neg  B12, Fol and Iron all nl  LDH increased from prob cell turnover in spleen  keep hgb > 8 to relieve weakness/tiredness  f/u flow cyto sent   I spoke w/ hem: could offer venetoclax + vidaza vs hospice (See GOC below)    # Elevated leukocytosis - resolved - Afebrile  no clear source of infection  cxr clear  Bcx negative  Ucx - likely contaminate     # Dementia: likely advance vascular dementia  not on medication  pleasant, no behavioral issues    # Interstitial Lung disease: fairly asymp  diagnosed in 2017   on home o2 - cont as needed  patient did not follow up with pulmonary (Dr Guido) - can do so as outpt as desired    # Colon cancer with hx liver mets: s/p resection 15 years ago  in remission now    # H/o Ischemic CVA with left sided weakness in september  no focal deficit now  Continue with aspirin (as long as plt OK) and statin  Diet: dysphagia 3    # multiple BMs - no diarrhea  can use colace at home  decr senna to 1 tab po HS PRN constipation    # DVT ppx: lovenox    # DNR/DNI; daughter is health care proxy;     # GOC: I spoke w/ dtr; they prefer to take pt home and will seek outpt hospice (likely); they will f/u w/ h/o on Fri and cont to work on finalizing their decision towards comfort care; they are aware of poor prognosis (ie, death), jane/ w/out tx; dtr would like pt to go home (pt lives w/ her) - given pt's age and dementia, I believe this is a reasonable option (ie, comfort care)    Dispo: f/u final BM Bx and flow cyto w/ h/o as outpt; set up HC (dtr does not want SNF); d/c home today

## 2019-12-18 NOTE — PROGRESS NOTE ADULT - SUBJECTIVE AND OBJECTIVE BOX
DORINDAWILLIAN  89y  Female  ***My note supersedes ALL resident notes that I sign.  My corrections for their notes are in my note.***    I can be reached directly on [employer]. My office number is [phone]. My personal cell number is [phone].    INTERVAL EVENTS: Here for f/u of anemia. 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 I spoke w/ Dr Brannon last night. Spoke w/ dieter last night and today. Pt feels fine.    T(F): 97.4 (12-18-19 @ 04:48), Max: 97.4 (12-17-19 @ 13:12)  HR: 67 (12-18-19 @ 04:48) (65 - 70)  BP: 136/62 (12-18-19 @ 04:48) (132/62 - 136/62)  RR: 17 (12-18-19 @ 04:48) (17 - 18)  SpO2: 96% (12-17-19 @ 20:53) (96% - 96%)    Gen: NAD  HEENT: WNL  Neck: no JVD  lungs: clr  Hrt: s1 s2 rrr  abd: soft NT/ND; no masses  ext: no edema; no c/c    LABS:                        8.5     (    84.4   5.00  )-----------( ---------      181      ( 18 Dec 2019 07:17 )             27.6    (    19.7     PT/INR - ( 17 Dec 2019 07:35 )   PT: 14.20 sec;   INR: 1.24 ratio      RADIOLOGY & ADDITIONAL TESTS:      MEDICATIONS:    aspirin  chewable 81 milliGRAM(s) Oral daily  atorvastatin 40 milliGRAM(s) Oral at bedtime  enoxaparin Injectable 40 milliGRAM(s) SubCutaneous at bedtime  ferrous sulfate Oral Tab/Cap - Peds 325 milliGRAM(s) Oral daily  folic acid 1 milliGRAM(s) Oral daily  metoprolol succinate ER 25 milliGRAM(s) Oral daily  senna 1 Tablet(s) Oral at bedtime PRN

## 2019-12-18 NOTE — PROGRESS NOTE ADULT - REASON FOR ADMISSION
inability to ambulate

## 2019-12-19 LAB
CULTURE RESULTS: SIGNIFICANT CHANGE UP
SPECIMEN SOURCE: SIGNIFICANT CHANGE UP

## 2019-12-20 ENCOUNTER — APPOINTMENT (OUTPATIENT)
Dept: HEMATOLOGY ONCOLOGY | Facility: CLINIC | Age: 84
End: 2019-12-20

## 2019-12-23 ENCOUNTER — APPOINTMENT (OUTPATIENT)
Dept: INFUSION THERAPY | Facility: CLINIC | Age: 84
End: 2019-12-23

## 2019-12-30 DIAGNOSIS — M48.56XA COLLAPSED VERTEBRA, NOT ELSEWHERE CLASSIFIED, LUMBAR REGION, INITIAL ENCOUNTER FOR FRACTURE: ICD-10-CM

## 2019-12-30 DIAGNOSIS — D64.9 ANEMIA, UNSPECIFIED: ICD-10-CM

## 2019-12-30 DIAGNOSIS — J84.9 INTERSTITIAL PULMONARY DISEASE, UNSPECIFIED: ICD-10-CM

## 2019-12-30 DIAGNOSIS — E78.5 HYPERLIPIDEMIA, UNSPECIFIED: ICD-10-CM

## 2019-12-30 DIAGNOSIS — J96.10 CHRONIC RESPIRATORY FAILURE, UNSPECIFIED WHETHER WITH HYPOXIA OR HYPERCAPNIA: ICD-10-CM

## 2019-12-30 DIAGNOSIS — F01.50 VASCULAR DEMENTIA WITHOUT BEHAVIORAL DISTURBANCE: ICD-10-CM

## 2019-12-30 DIAGNOSIS — D46.9 MYELODYSPLASTIC SYNDROME, UNSPECIFIED: ICD-10-CM

## 2019-12-30 DIAGNOSIS — D63.0 ANEMIA IN NEOPLASTIC DISEASE: ICD-10-CM

## 2019-12-30 DIAGNOSIS — R16.1 SPLENOMEGALY, NOT ELSEWHERE CLASSIFIED: ICD-10-CM

## 2019-12-30 DIAGNOSIS — I69.354 HEMIPLEGIA AND HEMIPARESIS FOLLOWING CEREBRAL INFARCTION AFFECTING LEFT NON-DOMINANT SIDE: ICD-10-CM

## 2019-12-30 DIAGNOSIS — Z85.05 PERSONAL HISTORY OF MALIGNANT NEOPLASM OF LIVER: ICD-10-CM

## 2019-12-30 DIAGNOSIS — R26.2 DIFFICULTY IN WALKING, NOT ELSEWHERE CLASSIFIED: ICD-10-CM

## 2019-12-30 DIAGNOSIS — Z85.038 PERSONAL HISTORY OF OTHER MALIGNANT NEOPLASM OF LARGE INTESTINE: ICD-10-CM

## 2019-12-30 DIAGNOSIS — Z66 DO NOT RESUSCITATE: ICD-10-CM

## 2020-01-06 ENCOUNTER — APPOINTMENT (OUTPATIENT)
Dept: HEMATOLOGY ONCOLOGY | Facility: CLINIC | Age: 85
End: 2020-01-06

## 2020-12-31 PROBLEM — G30.9 ALZHEIMER'S DEMENTIA: Status: ACTIVE | Noted: 2019-10-25

## 2021-06-22 NOTE — ED ADULT NURSE NOTE - PMH
Please schedule for 40 inhalant testing, put in AVS and meds to hold. Also needs separate appointment for penicillin testing and amox after that - no rush on that.  No pertinent past medical history <<----- Click to add NO pertinent Past Medical History

## 2022-05-03 NOTE — ED ADULT NURSE NOTE - PAIN: PRESENCE, MLM
Goal Outcome Evaluation:  Per plastics note POD 5 s/p R foot wound debridement, R great toe extensor, L free radial forearm flap recon.  VSS on RA. Pain managed with PRN Oxycodone, scheduled Tylenol. Denies nausea and SOB this shift. Bedrest with bedside commode privileges, elevating RLE at all times. Flap checks q 4 hrs, doppler stable, Vioptix 82-83 % with signal quality over 90. Dressing and ace wraps to LUE are CDI. Continue POC.        0

## 2022-06-14 NOTE — DISCHARGE NOTE PROVIDER - CARE PROVIDER_API CALL
normal mood with appropriate affect
Kaleb Tenorio)  Neurology; Vascular Neurology  [address] [place] [address], [address] [id number]  Phone: [phone]  Fax: [phone]  Follow Up Time: 2 weeks

## 2022-08-31 NOTE — OCCUPATIONAL THERAPY INITIAL EVALUATION ADULT - EATING, PREVIOUS LEVEL OF FUNCTION, OT EVAL
[de-identified] : GENERAL APPEARANCE: Well nourished and hydrated, pleasant, alert, and oriented x 3. Appears their stated age. \par HEENT: Normocephalic, extraocular eye motion intact. Nasal septum midline. Oral cavity clear. External auditory canal clear. \par RESPIRATORY: Breath sounds clear and audible in all lobes. No wheezing, No accessory muscle use.\par CARDIOVASCULAR: No apparent abnormalities. No lower leg edema. No varicosities. Pedal pulses are palpable.\par NEUROLOGIC: Sensation is normal, no muscle weakness in the upper or lower extremities.\par DERMATOLOGIC: No apparent skin lesions, moist, warm, no rash.\par SPINE: Cervical spine appears normal and moves freely; thoracic spine appears normal and moves freely; lumbosacral spine appears normal and moves freely, normal, nontender.\par MUSCULOSKELETAL: Hands, wrists, and elbows are normal and move freely, shoulders are normal and move freely. \par Musculoskeletal: Gait: normal and not antalgic . left knee exam shows healed incision ROM 0 - 110 degrees, no effusion. pain at the pes bursa\par  \par 5/5 motor strength in bilateral lower extremities. Sensory: Intact in bilateral lower extremities. DTRs: Biceps, brachioradialis, triceps, patellar, ankle and plantar 2+ and symmetric bilaterally. Pulses: dorsalis pedis, posterior tibial, femoral, popliteal, and radial 2+ and symmetric bilaterally. \par Constitutional: well-appearing, not in acute distress and not obese.  [de-identified] : Right hip exam shows pain with straight leg raise. FROM. pain at greater trochanteric bursa [de-identified] : 1V xray of the pelvis done in the office today and reviewed by Dr. Ra Amaro demonstrates mild osteoarthritis of right hip with no pathologic legion or fractures. \par \par 2 view X-ray of  right knee xray  demonstrate implants are in good alignment. No evidence of subsidence or loosening or wear no fracture or dislocation independent

## 2024-02-01 NOTE — H&P ADULT - HISTORY OF PRESENT ILLNESS
From: Sweetie Neff  To: Taylor Spaulding  Sent: 1/29/2024 3:15 PM EST  Subject: Trazodone     The trazodone wasn't included in the prescriptions my pharmacy recieved today. Can you please send my pharmacy that prescription.   Thanks   89 year old female with PMHx of Colon Ca with liver mets s/p resection 15 years ago, dementia brought in due to left sided weakness. Patient lives at home alone, was called by daughter around 1 PM yesterday and was fine. Daughter came to visit her today, and found her in bed unable to move left side and having difficulty speaking. Daughter states that patient goes to bed around 10, so that's likely time last known well.   Currently patient denies any complaints.  Per daughter, at baseline patient ambulates at home, has mild dementia, with problems with short term memory. 89 year old female with distant history of Colon Ca with liver mets s/p resection 15 years ago, dementia brought in due to left sided weakness. Patient lives at home alone, was called by daughter around 1 PM yesterday and was fine. Daughter came to visit her today, and found her in bed unable to move left side and having difficulty speaking. Daughter states that patient goes to bed around 10, so that's likely time last known well.   Currently patient denies any complaints.  Per daughter, at baseline patient ambulates at home, has mild dementia, with problems with short term memory. Attending Only

## 2024-04-01 NOTE — PATIENT PROFILE ADULT - BRADEN SENSORY
What Is The Reason For Today's Visit?: the risk of recurrence, and the development of new lesions
Additional History: Pt needs refill on doxycycline for rosacea.
(3) slightly limited
